# Patient Record
Sex: MALE | Race: WHITE | Employment: OTHER | URBAN - METROPOLITAN AREA
[De-identification: names, ages, dates, MRNs, and addresses within clinical notes are randomized per-mention and may not be internally consistent; named-entity substitution may affect disease eponyms.]

---

## 2022-09-14 RX ORDER — LOSARTAN POTASSIUM 25 MG/1
25 TABLET ORAL DAILY
COMMUNITY

## 2022-09-14 RX ORDER — ASPIRIN 81 MG/1
81 TABLET ORAL DAILY
COMMUNITY

## 2022-09-14 RX ORDER — GLIPIZIDE 5 MG/1
5 TABLET ORAL
COMMUNITY

## 2022-09-14 RX ORDER — ROSUVASTATIN CALCIUM 20 MG/1
20 TABLET, COATED ORAL DAILY
COMMUNITY

## 2022-09-14 RX ORDER — CLOPIDOGREL BISULFATE 75 MG/1
75 TABLET ORAL DAILY
COMMUNITY
End: 2022-09-14

## 2022-09-14 NOTE — PROGRESS NOTES
Name_______________________________________Printed:____________________  Date and time of surgery__9/19/2022_____1345_________________Arrival Time:___1215_____________   1. The instructions given regarding when and if a patient needs to stop oral intake prior to surgery varies. Follow the specific instructions you were given                  __x_Nothing to eat or to drink after Midnight the night before.                   ____Carbo loading or ERAS instructions will be given to select patients-if you have been given those instructions -please do the following                           The evening before your surgery after dinner before midnight drink 40 ounces of gatorade. If you are diabetic use sugar free. The morning of surgery drink 40 ounces of water. This needs to be finished 3 hours prior to your surgery start time. 2. Take the following pills with a small sip of water on the morning of surgery___________________________________________________                  Do not take blood pressure medications ending in pril or sartan the fausto prior to surgery or the morning of surgery_   3. Aspirin, Ibuprofen, Advil, Naproxen, Vitamin E and other Anti-inflammatory products and supplements should be stopped for 5 -7days before surgery or as directed by your physician. 4. Check with your Doctor regarding stopping Plavix, Coumadin,Eliquis, Lovenox,Effient,Pradaxa,Xarelto, Fragmin or other blood thinners and follow their instructions. 5. Do not smoke, and do not drink any alcoholic beverages 24 hours prior to surgery. This includes NA Beer. Refrain from the usage of any recreational drugs. 6. You may brush your teeth and gargle the morning of surgery. DO NOT SWALLOW WATER   7. You MUST make arrangements for a responsible adult to stay on site while you are here and take you home after your surgery. You will not be allowed to leave alone or drive yourself home.   It is strongly suggested someone stay with you the first 24 hrs. Your surgery will be cancelled if you do not have a ride home. 8. A parent/legal guardian must accompany a child scheduled for surgery and plan to stay at the hospital until the child is discharged. Please do not bring other children with you. 9. Please wear simple, loose fitting clothing to the hospital.  Amy Hodge not bring valuables (money, credit cards, checkbooks, etc.) Do not wear any makeup (including no eye makeup) or nail polish on your fingers or toes. 10. DO NOT wear any jewelry or piercings on day of surgery. All body piercing jewelry must be removed. 11. If you have ___dentures, they will be removed before going to the OR; we will provide you a container. If you wear ___contact lenses or ___glasses, they will be removed; please bring a case for them. 12. Please see your family doctor/pediatrician for a history & physical and/or concerning medications. Bring any test results/reports from your physician's office. PCP__________________Phone___________H&P Appt. Date________             13 If you  have a Living Will and Durable Power of  for Healthcare, please bring in a copy. 15. Notify your Surgeon if you develop any illness between now and surgery  time, cough, cold, fever, sore throat, nausea, vomiting, etc.  Please notify your surgeon if you experience dizziness, shortness of breath or blurred vision between now & the time of your surgery             15. DO NOT shave your operative site 96 hours prior to surgery. For face & neck surgery, men may use an electric razor 48 hours prior to surgery. 16. Shower the night before or morning of surgery using an antibacterial soap or as you have been instructed. 17. To provide excellent care visitors will be limited to one in the room at any given time. 18.  Please bring picture ID and insurance card.              19.  Visit our web site for additional information:  [email or web address]              20.During flu season no children under the age of 15 are permitted in the hospital for the safety of all patients. 21. If you take a long acting insulin in the evening only  take half of your usual  dose the night  before your procedure              22. If you use a c-pap please bring DOS if staying overnight,             23.For your convenience [facility] has a pharmacy on site to fill your prescriptions. 24. If you use oxygen and have a portable tank please bring it  with you the DOS             25. Bring a complete list of all your medications with name and dose include any supplements. 26. Other__________________________________________   *Please call pre admission testing if you any further questions   [address] [place]  [phone]   [address] [place] [address]       VISITOR POLICY(subject to change)    Current policy is 2 visitors per patient. No children. A mask is required. Visiting hours are 8a-8p. Overnight visitors will be at the discretion of the nurse. All above information reviewed with patient in person or by phone. Patient verbalizes understanding. All questions and concerns addressed.                                                                                                  Patient/Rep____patient________________                                                                                                                                    PRE OP INSTRUCTIONS

## 2022-09-16 NOTE — PROGRESS NOTES
Patient notified of surgery time change to 0845 with 0715 arrival. All other instructions remain the same. Verbalized understanding.

## 2022-09-19 ENCOUNTER — HOSPITAL ENCOUNTER (OUTPATIENT)
Age: 72
Setting detail: OUTPATIENT SURGERY
Discharge: HOME | End: 2022-09-19
Attending: PODIATRIST | Admitting: PODIATRIST
Payer: MEDICARE

## 2022-09-19 ENCOUNTER — ANESTHESIA (OUTPATIENT)
Dept: OPERATING ROOM | Age: 72
End: 2022-09-19
Payer: MEDICARE

## 2022-09-19 ENCOUNTER — ANESTHESIA EVENT (OUTPATIENT)
Dept: OPERATING ROOM | Age: 72
End: 2022-09-19
Payer: MEDICARE

## 2022-09-19 VITALS
BODY MASS INDEX: 32.14 KG/M2 | WEIGHT: 217 LBS | HEIGHT: 69 IN | HEART RATE: 83 BPM | SYSTOLIC BLOOD PRESSURE: 142 MMHG | DIASTOLIC BLOOD PRESSURE: 87 MMHG | RESPIRATION RATE: 11 BRPM | OXYGEN SATURATION: 99 % | TEMPERATURE: 97 F

## 2022-09-19 LAB
GLUCOSE BLD-MCNC: 126 MG/DL (ref 70–99)
GLUCOSE BLD-MCNC: 129 MG/DL (ref 70–99)
PERFORMED ON: ABNORMAL
PERFORMED ON: ABNORMAL

## 2022-09-19 PROCEDURE — 2500000003 HC RX 250 WO HCPCS: Performed by: NURSE ANESTHETIST, CERTIFIED REGISTERED

## 2022-09-19 PROCEDURE — 2709999900 HC NON-CHARGEABLE SUPPLY: Performed by: PODIATRIST

## 2022-09-19 PROCEDURE — 7100000011 HC PHASE II RECOVERY - ADDTL 15 MIN: Performed by: PODIATRIST

## 2022-09-19 PROCEDURE — 2500000003 HC RX 250 WO HCPCS: Performed by: PODIATRIST

## 2022-09-19 PROCEDURE — C1889 IMPLANT/INSERT DEVICE, NOC: HCPCS | Performed by: PODIATRIST

## 2022-09-19 PROCEDURE — 6360000002 HC RX W HCPCS: Performed by: ANESTHESIOLOGY

## 2022-09-19 PROCEDURE — 3700000001 HC ADD 15 MINUTES (ANESTHESIA): Performed by: PODIATRIST

## 2022-09-19 PROCEDURE — 6360000002 HC RX W HCPCS: Performed by: NURSE ANESTHETIST, CERTIFIED REGISTERED

## 2022-09-19 PROCEDURE — 3600000013 HC SURGERY LEVEL 3 ADDTL 15MIN: Performed by: PODIATRIST

## 2022-09-19 PROCEDURE — 7100000010 HC PHASE II RECOVERY - FIRST 15 MIN: Performed by: PODIATRIST

## 2022-09-19 PROCEDURE — 6360000002 HC RX W HCPCS: Performed by: PODIATRIST

## 2022-09-19 PROCEDURE — 3700000000 HC ANESTHESIA ATTENDED CARE: Performed by: PODIATRIST

## 2022-09-19 PROCEDURE — 64445 NJX AA&/STRD SCIATIC NRV IMG: CPT | Performed by: ANESTHESIOLOGY

## 2022-09-19 PROCEDURE — 2580000003 HC RX 258: Performed by: PODIATRIST

## 2022-09-19 PROCEDURE — 7100000000 HC PACU RECOVERY - FIRST 15 MIN: Performed by: PODIATRIST

## 2022-09-19 PROCEDURE — 2720000010 HC SURG SUPPLY STERILE: Performed by: PODIATRIST

## 2022-09-19 PROCEDURE — 7100000001 HC PACU RECOVERY - ADDTL 15 MIN: Performed by: PODIATRIST

## 2022-09-19 PROCEDURE — C1713 ANCHOR/SCREW BN/BN,TIS/BN: HCPCS | Performed by: PODIATRIST

## 2022-09-19 PROCEDURE — 3600000014 HC SURGERY LEVEL 4 ADDTL 15MIN: Performed by: PODIATRIST

## 2022-09-19 PROCEDURE — 3600000003 HC SURGERY LEVEL 3 BASE: Performed by: PODIATRIST

## 2022-09-19 PROCEDURE — 3600000004 HC SURGERY LEVEL 4 BASE: Performed by: PODIATRIST

## 2022-09-19 PROCEDURE — 2500000003 HC RX 250 WO HCPCS: Performed by: ANESTHESIOLOGY

## 2022-09-19 DEVICE — PATCH AMNION 2 LAYR PROTCT 4 X 4CM STERISHIELD II: Type: IMPLANTABLE DEVICE | Site: FOOT | Status: FUNCTIONAL

## 2022-09-19 DEVICE — SONICANCHOR KIT
Type: IMPLANTABLE DEVICE | Site: FOOT | Status: FUNCTIONAL
Brand: SONICANCHOR

## 2022-09-19 RX ORDER — SODIUM CHLORIDE 9 MG/ML
INJECTION, SOLUTION INTRAVENOUS CONTINUOUS
Status: DISCONTINUED | OUTPATIENT
Start: 2022-09-19 | End: 2022-09-19 | Stop reason: HOSPADM

## 2022-09-19 RX ORDER — FENTANYL CITRATE 50 UG/ML
100 INJECTION, SOLUTION INTRAMUSCULAR; INTRAVENOUS ONCE
Status: COMPLETED | OUTPATIENT
Start: 2022-09-19 | End: 2022-09-19

## 2022-09-19 RX ORDER — PROPOFOL 10 MG/ML
INJECTION, EMULSION INTRAVENOUS PRN
Status: DISCONTINUED | OUTPATIENT
Start: 2022-09-19 | End: 2022-09-19 | Stop reason: SDUPTHER

## 2022-09-19 RX ORDER — MIDAZOLAM HYDROCHLORIDE 2 MG/2ML
2 INJECTION, SOLUTION INTRAMUSCULAR; INTRAVENOUS ONCE
Status: COMPLETED | OUTPATIENT
Start: 2022-09-19 | End: 2022-09-19

## 2022-09-19 RX ORDER — EPHEDRINE SULFATE/0.9% NACL/PF 50 MG/5 ML
SYRINGE (ML) INTRAVENOUS PRN
Status: DISCONTINUED | OUTPATIENT
Start: 2022-09-19 | End: 2022-09-19 | Stop reason: SDUPTHER

## 2022-09-19 RX ORDER — SODIUM CHLORIDE, SODIUM LACTATE, POTASSIUM CHLORIDE, CALCIUM CHLORIDE 600; 310; 30; 20 MG/100ML; MG/100ML; MG/100ML; MG/100ML
INJECTION, SOLUTION INTRAVENOUS CONTINUOUS
Status: DISCONTINUED | OUTPATIENT
Start: 2022-09-19 | End: 2022-09-19 | Stop reason: HOSPADM

## 2022-09-19 RX ORDER — FENTANYL CITRATE 50 UG/ML
INJECTION, SOLUTION INTRAMUSCULAR; INTRAVENOUS PRN
Status: DISCONTINUED | OUTPATIENT
Start: 2022-09-19 | End: 2022-09-19 | Stop reason: SDUPTHER

## 2022-09-19 RX ORDER — LIDOCAINE HYDROCHLORIDE 20 MG/ML
INJECTION, SOLUTION EPIDURAL; INFILTRATION; INTRACAUDAL; PERINEURAL PRN
Status: DISCONTINUED | OUTPATIENT
Start: 2022-09-19 | End: 2022-09-19 | Stop reason: SDUPTHER

## 2022-09-19 RX ORDER — DEXAMETHASONE SODIUM PHOSPHATE 4 MG/ML
INJECTION, SOLUTION INTRA-ARTICULAR; INTRALESIONAL; INTRAMUSCULAR; INTRAVENOUS; SOFT TISSUE PRN
Status: DISCONTINUED | OUTPATIENT
Start: 2022-09-19 | End: 2022-09-19 | Stop reason: SDUPTHER

## 2022-09-19 RX ORDER — LIDOCAINE HYDROCHLORIDE 10 MG/ML
0.5 INJECTION, SOLUTION EPIDURAL; INFILTRATION; INTRACAUDAL; PERINEURAL ONCE
Status: DISCONTINUED | OUTPATIENT
Start: 2022-09-19 | End: 2022-09-19 | Stop reason: HOSPADM

## 2022-09-19 RX ORDER — BUPIVACAINE HYDROCHLORIDE 5 MG/ML
INJECTION, SOLUTION EPIDURAL; INTRACAUDAL
Status: COMPLETED | OUTPATIENT
Start: 2022-09-19 | End: 2022-09-19

## 2022-09-19 RX ORDER — DEXAMETHASONE SODIUM PHOSPHATE 4 MG/ML
INJECTION, SOLUTION INTRA-ARTICULAR; INTRALESIONAL; INTRAMUSCULAR; INTRAVENOUS; SOFT TISSUE
Status: COMPLETED | OUTPATIENT
Start: 2022-09-19 | End: 2022-09-19

## 2022-09-19 RX ORDER — ONDANSETRON 2 MG/ML
INJECTION INTRAMUSCULAR; INTRAVENOUS PRN
Status: DISCONTINUED | OUTPATIENT
Start: 2022-09-19 | End: 2022-09-19 | Stop reason: SDUPTHER

## 2022-09-19 RX ORDER — LIDOCAINE HYDROCHLORIDE 10 MG/ML
INJECTION, SOLUTION EPIDURAL; INFILTRATION; INTRACAUDAL; PERINEURAL
Status: COMPLETED | OUTPATIENT
Start: 2022-09-19 | End: 2022-09-19

## 2022-09-19 RX ADMIN — DEXAMETHASONE SODIUM PHOSPHATE 10 MG: 4 INJECTION, SOLUTION INTRAMUSCULAR; INTRAVENOUS at 09:16

## 2022-09-19 RX ADMIN — PROPOFOL 130 MG: 10 INJECTION, EMULSION INTRAVENOUS at 09:08

## 2022-09-19 RX ADMIN — FENTANYL CITRATE 100 MCG: 50 INJECTION, SOLUTION INTRAMUSCULAR; INTRAVENOUS at 08:45

## 2022-09-19 RX ADMIN — Medication 10 MG: at 09:23

## 2022-09-19 RX ADMIN — Medication 10 MG: at 09:29

## 2022-09-19 RX ADMIN — FENTANYL CITRATE 25 MCG: 50 INJECTION, SOLUTION INTRAMUSCULAR; INTRAVENOUS at 09:31

## 2022-09-19 RX ADMIN — SODIUM CHLORIDE: 9 INJECTION, SOLUTION INTRAVENOUS at 07:08

## 2022-09-19 RX ADMIN — ONDANSETRON 4 MG: 2 INJECTION INTRAMUSCULAR; INTRAVENOUS at 09:16

## 2022-09-19 RX ADMIN — BUPIVACAINE HYDROCHLORIDE 20 ML: 5 INJECTION, SOLUTION EPIDURAL; INTRACAUDAL at 08:46

## 2022-09-19 RX ADMIN — MIDAZOLAM HYDROCHLORIDE 2 MG: 1 INJECTION, SOLUTION INTRAMUSCULAR; INTRAVENOUS at 08:45

## 2022-09-19 RX ADMIN — CEFAZOLIN 2000 MG: 2 INJECTION, POWDER, FOR SOLUTION INTRAMUSCULAR; INTRAVENOUS at 09:03

## 2022-09-19 RX ADMIN — LIDOCAINE HYDROCHLORIDE 100 MG: 20 INJECTION, SOLUTION EPIDURAL; INFILTRATION; INTRACAUDAL; PERINEURAL at 09:08

## 2022-09-19 RX ADMIN — FENTANYL CITRATE 50 MCG: 50 INJECTION, SOLUTION INTRAMUSCULAR; INTRAVENOUS at 09:07

## 2022-09-19 ASSESSMENT — PAIN SCALES - GENERAL
PAINLEVEL_OUTOF10: 0

## 2022-09-19 NOTE — PROGRESS NOTES
Pt arrived from OR to PACU bay 6. Reported received from [address] [place] [address] staff. Pt arousable to voice. Surgical incisions dressings in place to left foot. Pt on 6L simple mask. NSR, and VSS. Will continue to monitor.

## 2022-09-19 NOTE — H&P
Podiatric Surgery Same Day Surgery H&P Update Note  [place] [facility]        OSMEL have examined the patient and reviewed the original history and physical completed and find no relevant changes. The nature of the procedure, possible complications, alternative forms of therapy, post-op course, and post-op goals have been explained to patient/patient family. All questions have been answered. The consent has been signed. Patient's surgical site has been marked.       Discussed with Dr. Diane Decker DPM.     Dr. Diane Decker [facility]   Office: [phone]  Cell: [phone]

## 2022-09-19 NOTE — OP NOTE
Operative Note      Patient: Wilfrido Vasquez  YOB: 1950  MRN: [id number]    Date of Procedure: 9/19/2022    Pre-Op Diagnosis: M25.775 EXOSTOSIS/OSTEOPHYTE-LEFT FOOT  M76.72 PERONEAL TENDINITIS-LEFT FOOT    Post-Op Diagnosis: Same       Procedure(s):  49211- Ostectomy 5th Metatarsal- left;  20286- Repair Peroneus Brevis- left;  90679- Application Below Knee Splint- left     Surgeon(s):  Francisco Beyer DPM     Assistant:  Ysei Mendez DPM, PGY-3  Ema Gamez, MS-4     Anesthesia: General with popliteal/saphenous nerve block     Hemostasis: Pneumatic calf tourniquet at 250 mmHg for 43 minutes     Injectables: 3 cc of 1% lidocaine with epinephrine preoperatively     Materials: 2-0 vicryl, 3-0 vicryl, 3-0 nylon     Implants: SteriShield Dual Layer Amnion Graft 4x4cm  [employer] Sonic [place] x1      Estimated Blood Loss (mL): less than 50     Complications: None    Specimens:   * No specimens in log *    Implants:  Implant Name Type Inv. Item Serial No.  Lot No. LRB No. Used Action   PATCH AMNION 2 LAYR PROTCT 4 X 4CM STERISHIELD II - [id number]  PATCH AMNION 2 LAYR PROTCT 4 X 4CM STERISHIELD II [id number] BONE BANK ALLOGRAFTS-WD  Left 1 Implanted   sonic anchor moreno     [id number] Left 1 Implanted         Drains: * No LDAs found *    Findings: Partial thickness wound excised in total with reapproximation of skin edges with minimal tension. INDICATIONS FOR PROCEDURE: This patient has signs and symptoms clinically  consistent with the above mentioned preoperative diagnosis. Having failed conservative treatment, it was determined that the patient would benefit from surgical intervention. All potential risks, benefits, and complications were discussed with the patient prior to the scheduling of surgery. All the patient's questions were answered and no guarantees were given. The patient wished to proceed with surgery, and informed written consent was obtained.       DETAILS OF PROCEDURE: The patient received a popliteal/saphenous nerve block in the preoperative holding area performed by the anesthesia staff. The patient was brought from the pre-operative area and placed on the operating table in the supine position. A pneumatic calf tourniquet was placed around the patient's well-padded left lower extremity. Following IV sedation, 3 cc of 1% lidocaine with epinephrine was injected about the planned incision line to aid in hemostasis. The left lower extremity was then scrubbed, prepped, and draped in the usual sterile fashion. A time-out was performed. The patient, procedure, and operative site were confirmed. An Esmarch bandage was then utilized to exsanguinate the patient's left lower extremity. The tourniquet was then inflated to 250 mmHg and the following procedure was performed. PROCEDURE #1: 47525- Ostectomy 5th Metatarsal- left: Attention was directed toward the fifth metatarsal base of the left foot where a partial-thickness ulceration was noted over the lateral aspect of the fifth metatarsal base. Using #15 blade, a curvilinear incision was made, excising out the partial-thickness ulceration. Using a combination of blunt and sharp dissection, the incision was was carried down to the level of deep fascia. Care was taken to identify and retract all vital neurovascular structures. All venous tributaries were electrocoagulated as encountered. Next, using #15 blade, a periosteal incision was made along the lateral aspect of the fifth metatarsal base. At this time, the fracture line was identified. A sagittal saw was then used to osteotomize the fifth metatarsal at the level of the fracture line. The base of fifth metatarsal was then excised from its surrounding soft tissue attachments and passed from the operative field to the back table.  Care was taken to identify and tag the peroneus brevis tendon insertion at the base of the fifth metatarsal to be used later for tendon transfer. Next, using a rongeur and a hand rasp, the sharp bony prominences of the fifth metatarsal were resected until a smooth anatomic contour was noted. The surgical site was then irrigated with copious amounts of normal sterile saline. PROCEDURE #2: 24425- Repair Peroneus Brevis- left: Attention was then directed toward the cuboid. A [employer] sonic anchor was then inserted into the cuboid bone according manufactures instructions. The 2-0 ForceFiber suture attached to the sonic anchor was then used to reinsert the peroneus brevis tendon. The surgical site was then irrigated with copious amounts of normal sterile saline. Next, the periosteal layer was reapproximated using 3-0 vicryl. At this time, a SteriShiled Dual Layer Amnion graft was prepared on the back table according to the manufacturers instructions. The graft was then applied over the insertion of the peroneus brevis to prevent adhesion formation and aid in postoperative recovery. Skin edges were then reapproximated using 3-0 nylon in a simple interrupted suture fashion. PROCEDURE #3: 31509- Application of Below Knee Splint- left:  A soft sterile dressing was applied consisting of Polysporin ointment, Adaptic, gauze, cast padding, Ace band. The pneumatic calf tourniquet was rapidly deflated after a total time of 43 minutes and a prompt hyperemic response was noted on all aspects of the patient's left lower extremity. Next, copious amounts of cast padding was applied to the patient's left lower extremity from the metatarsal heads to approximately 3 finger breaths distal to the head and neck of the fibula. Next, using moistened padded splint material, a posterior splint was applied from the plantar foot posteriorly up the leg to approximately the midcalf area. ACE compression was then applied from distal to proximal to secure the posterior splint in place and provide compression to prevent post-operative edema.  At this time, the foot was then placed in neutral position to prevent acquired equinus deformity and relieve tension on the surgical repair. END OF PROCEDURE: The patient tolerated the procedure and anesthesia well and was transported from the operating room to the PACU with vital signs stable and vascular status intact to all aspects of the patient's left lower extremity and digital capillary refill time immediate to the digits of the left foot. Following a period of post-operative monitoring, the patient will be discharged home with written and oral wound care and follow-up instructions per Dr. Eleni Sacks. The patient is to follow-up with Dr. Josiah Joe in his private office within 5-7 days. The patient is to keep dressing clean, dry and intact at all times. The patient is to call with if any complications occur.     Dictated on behalf of Dr. Eleni Sacks, DPM Dalbert Johann, DPM  Podiatric Resident, PGY-3    Electronically signed by Binta Rodriguez DPM on 9/19/2022 at 12:29 PM

## 2022-09-19 NOTE — ANESTHESIA POSTPROCEDURE EVALUATION
Department of Anesthesiology  Postprocedure Note    Patient: Elzie Schirmer  MRN: [id number]  YOB: 1950  Date of evaluation: 9/19/2022      Procedure Summary     Date: 09/19/22 Room / Location: [address]    Anesthesia Start: 2162 Anesthesia Stop: 9372    Procedures:       OSTECTOMY FIFTH METATARSAL-LEFT-POPLITEAL BLOCK-LIZANDRO (Left: Foot)      REPAIR PERONEUS BREVIS-LEFT FOOT (Left) Diagnosis:       Osteophyte, left foot      Peroneal tendinitis, left      (M25.775 EXOSTOSIS/OSTEOPHYTE-LEFT FOOT)      (M76.72 PERONEAL TENDINITIS-LEFT FOOT)    Surgeons: Antonio Salinas DPM Responsible Provider:     Anesthesia Type: general, regional ASA Status: 2          Anesthesia Type: No value filed.     Kranthi Phase I: Kranthi Score: 8    Kranthi Phase II:        Anesthesia Post Evaluation    Patient location during evaluation: PACU  Patient participation: complete - patient participated  Level of consciousness: awake and alert  Pain score: 2  Airway patency: patent  Nausea & Vomiting: no vomiting  Complications: no  Cardiovascular status: blood pressure returned to baseline  Respiratory status: acceptable  Hydration status: euvolemic  Multimodal analgesia pain management approach

## 2022-09-19 NOTE — DISCHARGE INSTRUCTIONS
[facility] [address]   [address]  [phone]    Dr. Marge Thomas Operative Instructions    1. Have Prescriptions filled and take as directed. All medications should be taken with food or milk. 2.  Keep foot elevated six inches above the level of the heart. Support feet, legs, and knees with pillows. 3.  KEEP FOOT ELEVATED AS MUCH AS POSSIBLE UNTIL YOUR NEXT VISIT    4. Place an ice pack on the bandaged site for 15 minutes every hour while awake    5. Keep dressing clean, dry, and intact. DO NOT REMOVE DRESSING. CALL THE OFFICE IF BANDAGE COMES OFF. 6.  For the first 7 days after surgery, take temperature by mouth three times a day. Call the office if greater than 101F. 7. NONweight bearing to the left lower extremity with surgical shoe or crutches / walker. 8.  All instructions are to be followed until otherwise instructed by your surgeon. 9.  Call our office if you have any concerns or questions which arise. Our phones are answered 24 hours a day. [phone]    49. Your first post-operative appointment is scheduled, call the office for appointment date and time if not known prior to today. ANESTHESIA DISCHARGE INSTRUCTIONS    Wear your seatbelt home. You are under the influence of drugs-do not drink alcohol, drive, operate machinery, make any important decisions or sign any legal documents for 24 hours. A responsible adult needs to be with you for 24 hours. You may experience lightheadedness, dizziness, or sleepiness following surgery. Rest at home today- increase activity as tolerated. Progress slowly to a regular diet unless your physician has instructed you otherwise. Drink plenty of water. If persistent nausea and vomiting becomes a problem, call your physician.   Coughing, sore throat and muscle aches are other side effects of anesthesia, and should improve with time. Do not drive or operate machinery while taking narcotics. GENERAL SURGERY DISCHARGE INSTRUCTIONS    Follow your surgeons instructions. Follow up with your surgeon as directed. Observe the operative area for signs of excessive bleeding. If needed apply pressure,elevate if able and contact your surgeon. Observe the operative site for any signs of infection- such as increased pain,redness,fever greater than 101 degrees,swelling, foul odor or drainage. Contact your surgeon if any of these symptoms are present. Keep operative site clean and dry. Do not remove dressing unless instructed to by surgeon. Apply ice as directed. If unable to urinate once you are at home,  notify your surgeon or go to the Emergency Room. Avoid pulling,pushing or tugging to suture line. If you become short of breath call your doctor or go to the ER. Take medications as directed. Pain medication should be taken with food. Do not drive or operate machinery while taking narcotics. For any problems or question call your surgeon.

## 2022-09-19 NOTE — ANESTHESIA PRE PROCEDURE
Department of Anesthesiology  Preprocedure Note       Name:  Chela Reece   Age:  81 y.o.  :  1950                                          MRN:  [id number]         Date:  2022      Surgeon: Jennifer Dale):  Vipul Mcwilliams DPM    Procedure: Procedure(s):  OSTECTOMY FIFTH METATARSAL-LEFT-POPLITEAL BLOCK-LIZANDRO  REPAIR PERONEUS BREVIS-LEFT FOOT    Medications prior to admission:   Prior to Admission medications    Medication Sig Start Date End Date Taking? Authorizing Provider   aspirin 81 MG EC tablet Take 81 mg by mouth daily   Yes Historical Provider, MD   metFORMIN (GLUCOPHAGE) 500 MG tablet Take 500 mg by mouth 2 times daily (with meals) 2 tabs in am and 1 tab po hs   Yes Historical Provider, MD   losartan (COZAAR) 25 MG tablet Take 25 mg by mouth daily   Yes Historical Provider, MD   rosuvastatin (CRESTOR) 20 MG tablet Take 20 mg by mouth daily   Yes Historical Provider, MD   glipiZIDE (GLUCOTROL) 5 MG tablet Take 5 mg by mouth 2 times daily (before meals)   Yes Historical Provider, MD       Current medications:    Current Facility-Administered Medications   Medication Dose Route Frequency Provider Last Rate Last Admin    ceFAZolin (ANCEF) 2,000 mg in dextrose 5 % 50 mL IVPB (mini-bag)  2,000 mg IntraVENous On Call to [address] [place] [address], DP        lactated ringers infusion   IntraVENous Continuous Antonio Acuña DPM        lidocaine PF 1 % injection 0.5 mL  0.5 mL IntraDERmal Once Vipul Mcwilliams DPM        0.9 % sodium chloride infusion   IntraVENous Continuous JAMAAL BotelloM 100 mL/hr at 22 0708 New Bag at 22 0708       Allergies: Allergies   Allergen Reactions    Ace Inhibitors      cough    Pcn [Penicillins]        Problem List:  There is no problem list on file for this patient.       Past Medical History:        Diagnosis Date    CAD (coronary artery disease)     CKD (chronic kidney disease) stage 3, GFR 30-59 ml/min ([facility])     Diabetes mellitus ([facility]     Hyperlipidemia     Hypertension     Ischemic stroke ([facility]        Past Surgical History:        Procedure Laterality Date    HAND SURGERY         Social History:    Social History     Tobacco Use    Smoking status: Never    Smokeless tobacco: Never   Substance Use Topics    Alcohol use: Never                                Counseling given: Not Answered      Vital Signs (Current):   Vitals:    09/14/22 1504 09/19/22 0648   BP:  139/81   Pulse:  92   Resp:  16   Temp:  98.7 °F (37.1 °C)   TempSrc:  Temporal   SpO2:  98%   Weight: 216 lb (98 kg) 217 lb (98.4 kg)   Height: 5' 9\" (1.753 m) 5' 9\" (1.753 m)                                              BP Readings from Last 3 Encounters:   09/19/22 139/81       NPO Status: Time of last liquid consumption: 1800                        Time of last solid consumption: 1800                        Date of last liquid consumption: 09/18/22                        Date of last solid food consumption: 09/18/22    BMI:   Wt Readings from Last 3 Encounters:   09/19/22 217 lb (98.4 kg)     Body mass index is 32.05 kg/m².     CBC: No results found for: WBC, RBC, HGB, HCT, MCV, RDW, PLT    CMP: No results found for: NA, K, CL, CO2, BUN, CREATININE, GFRAA, AGRATIO, LABGLOM, GLUCOSE, GLU, PROT, CALCIUM, BILITOT, ALKPHOS, AST, ALT    POC Tests:   Recent Labs     09/19/22  0706   POCGLU 129*       Coags: No results found for: PROTIME, INR, APTT    HCG (If Applicable): No results found for: PREGTESTUR, PREGSERUM, HCG, HCGQUANT     ABGs: No results found for: PHART, PO2ART, OVH0MNU, YNA7AJT, BEART, Y7TCRBAJ     Type & Screen (If Applicable):  No results found for: LABABO, LABRH    Drug/Infectious Status (If Applicable):  No results found for: HIV, HEPCAB    COVID-19 Screening (If Applicable): No results found for: COVID19        Anesthesia Evaluation  Patient summary reviewed and Nursing notes reviewed  Airway: Mallampati: II  TM distance: >3 FB   Neck ROM: full  Mouth opening: > = 3 FB   Dental:          Pulmonary:                              Cardiovascular:  Exercise tolerance: good (>4 METS),   (+) hypertension: moderate, CAD: non-obstructive,                   Neuro/Psych:   (+) CVA: residual symptoms,             GI/Hepatic/Renal:   (+) renal disease: CRI,           Endo/Other:    (+) DiabetesType II DM, well controlled, , .                 Abdominal:             Vascular: Other Findings:           Anesthesia Plan      general and regional     ASA 2       Induction: intravenous. MIPS: Postoperative opioids intended and Prophylactic antiemetics administered. Anesthetic plan and risks discussed with patient. Plan discussed with CRNA.           Post-op pain plan if not by surgeon: single peripheral nerve block            Leti Ortega MD   9/19/2022

## 2022-09-19 NOTE — BRIEF OP NOTE
Brief Postoperative Note      Patient: Palma Vyas  YOB: 1950  MRN: [id number]    Date of Procedure: 9/19/2022    Pre-Op Diagnosis: M25.775 EXOSTOSIS/OSTEOPHYTE-LEFT FOOT  M76.72 PERONEAL TENDINITIS-LEFT FOOT    Post-Op Diagnosis: Same       Procedure(s):  10630- Ostectomy 5th Metatarsal- left;  54387- Repair Peroneus Brevis- left;  86023- Application Below Knee Splint- left    Surgeon(s):  Alex Gu DPM    Assistant:  Sunny Amaya DPM, PGY-3  Colletta Clamp, MS-4    Anesthesia: General with popliteal/saphenous nerve block    Hemostasis: Pneumatic calf tourniquet at 250 mmHg for 43 minutes    Injectables: 3 cc of 1% lidocaine with epinephrine preoperatively    Materials: 2-0 vicryl, 3-0 vicryl, 3-0 nylon    Implants: SteriShield Dual Layer Amnion Graft 4x4cm  [employer] Sonic [place] x1     Estimated Blood Loss (mL): less than 50     Complications: None    Specimens:   * No specimens in log *    Implants:  Implant Name Type Inv.  Item Serial No.  Lot No. LRB No. Used Action   PATCH AMNION 2 LAYR PROTCT 4 X 4CM STERISHIELD II - [id number]  PATCH AMNION 2 LAYR PROTCT 4 X 4CM STERISHIELD II [id number] B[employer] BANK ALLOGRAFTS-WD  Left 1 Implanted   sonic anchor moreno     [id number] Left 1 Implanted         Drains: * No LDAs found *    Findings: see op report    Electronically signed by Sunny Amaya DPM on 9/19/2022 at 10:18 AM

## 2022-09-19 NOTE — ANESTHESIA PROCEDURE NOTES
Peripheral Block    Patient location during procedure: pre-op  Reason for block: post-op pain management and at surgeon's request  Start time: 9/19/2022 8:46 AM  End time: 9/19/2022 8:51 AM  Staffing  Performed: anesthesiologist   Anesthesiologist: Gi Levy MD  Preanesthetic Checklist  Completed: patient identified, IV checked, site marked, risks and benefits discussed, surgical/procedural consents, equipment checked, pre-op evaluation, timeout performed, anesthesia consent given, oxygen available, monitors applied/VS acknowledged, fire risk safety assessment completed and verbalized and blood product R/B/A discussed and consented  Peripheral Block   Patient position: prone  Prep: ChloraPrep  Patient monitoring: cardiac monitor, continuous pulse ox, continuous capnometry, frequent blood pressure checks, IV access, oxygen and responsive to questions  Block type: Sciatic  Popliteal  Laterality: left  Injection technique: single-shot  Guidance: nerve stimulator and ultrasound guided    Needle   Needle type: insulated echogenic nerve stimulator needle   Needle gauge: 21 G  Needle localization: nerve stimulator, ultrasound guidance and anatomical landmarks  Test dose: negative  Assessment   Injection assessment: negative aspiration for heme, no paresthesia on injection, local visualized surrounding nerve on ultrasound and no intravascular symptoms  Paresthesia pain: immediately resolved  Slow fractionated injection: yes  Hemodynamics: stable  Real-time US image taken/store: yes  Outcomes: uncomplicated    Medications Administered  bupivacaine (PF) 0.5 % - Perineural   20 mL - 9/19/2022 8:46:00 AM

## 2022-10-02 NOTE — ADDENDUM NOTE
Addendum  created 10/02/22 1024 by Ciara Ahuja MD    Clinical Note Signed, Diagnosis association updated, Intraprocedure Blocks edited, SmartForm saved

## 2022-11-21 ENCOUNTER — HOSPITAL ENCOUNTER (INPATIENT)
Age: 72
LOS: 1 days | Discharge: HOME | DRG: 629 | End: 2022-11-22
Attending: EMERGENCY MEDICINE | Admitting: INTERNAL MEDICINE
Payer: MEDICARE

## 2022-11-21 ENCOUNTER — APPOINTMENT (OUTPATIENT)
Dept: GENERAL RADIOLOGY | Age: 72
DRG: 629 | End: 2022-11-21
Payer: MEDICARE

## 2022-11-21 DIAGNOSIS — L08.9 INFECTED WOUND: Primary | ICD-10-CM

## 2022-11-21 DIAGNOSIS — T14.8XXA INFECTED WOUND: Primary | ICD-10-CM

## 2022-11-21 DIAGNOSIS — L08.9 LEFT FOOT INFECTION: ICD-10-CM

## 2022-11-21 DIAGNOSIS — G89.18 POST-OPERATIVE PAIN: ICD-10-CM

## 2022-11-21 PROBLEM — L97.529 TYPE 2 DIABETES MELLITUS WITH LEFT DIABETIC FOOT ULCER (HCC): Status: ACTIVE | Noted: 2022-11-21

## 2022-11-21 PROBLEM — E11.621 TYPE 2 DIABETES MELLITUS WITH LEFT DIABETIC FOOT ULCER (HCC): Status: ACTIVE | Noted: 2022-11-21

## 2022-11-21 LAB
ABO/RH: NORMAL
ANION GAP SERPL CALCULATED.3IONS-SCNC: 11 MMOL/L (ref 3–16)
ANTIBODY SCREEN: NORMAL
BASOPHILS ABSOLUTE: 0.1 K/UL (ref 0–0.2)
BASOPHILS RELATIVE PERCENT: 0.5 %
BUN BLDV-MCNC: 22 MG/DL (ref 7–20)
C-REACTIVE PROTEIN: 17.3 MG/L (ref 0–5.1)
CALCIUM SERPL-MCNC: 9.9 MG/DL (ref 8.3–10.6)
CHLORIDE BLD-SCNC: 99 MMOL/L (ref 99–110)
CHP ED QC CHECK: YES
CO2: 27 MMOL/L (ref 21–32)
CREAT SERPL-MCNC: 1.1 MG/DL (ref 0.8–1.3)
EKG ATRIAL RATE: 78 BPM
EKG DIAGNOSIS: NORMAL
EKG P AXIS: 68 DEGREES
EKG P-R INTERVAL: 222 MS
EKG Q-T INTERVAL: 398 MS
EKG QRS DURATION: 106 MS
EKG QTC CALCULATION (BAZETT): 453 MS
EKG R AXIS: 66 DEGREES
EKG T AXIS: 91 DEGREES
EKG VENTRICULAR RATE: 78 BPM
EOSINOPHILS ABSOLUTE: 0.2 K/UL (ref 0–0.6)
EOSINOPHILS RELATIVE PERCENT: 1.4 %
GFR SERPL CREATININE-BSD FRML MDRD: >60 ML/MIN/{1.73_M2}
GLUCOSE BLD-MCNC: 114 MG/DL (ref 70–99)
GLUCOSE BLD-MCNC: 129 MG/DL (ref 70–99)
GLUCOSE BLD-MCNC: 140 MG/DL (ref 70–99)
GLUCOSE BLD-MCNC: 154 MG/DL
GLUCOSE BLD-MCNC: 154 MG/DL (ref 70–99)
HCT VFR BLD CALC: 38.2 % (ref 40.5–52.5)
HEMOGLOBIN: 12.9 G/DL (ref 13.5–17.5)
INR BLD: 1.16 (ref 0.87–1.14)
LYMPHOCYTES ABSOLUTE: 1.1 K/UL (ref 1–5.1)
LYMPHOCYTES RELATIVE PERCENT: 9.2 %
MCH RBC QN AUTO: 30 PG (ref 26–34)
MCHC RBC AUTO-ENTMCNC: 33.7 G/DL (ref 31–36)
MCV RBC AUTO: 88.9 FL (ref 80–100)
MONOCYTES ABSOLUTE: 1.2 K/UL (ref 0–1.3)
MONOCYTES RELATIVE PERCENT: 10.2 %
NEUTROPHILS ABSOLUTE: 9.6 K/UL (ref 1.7–7.7)
NEUTROPHILS RELATIVE PERCENT: 78.7 %
PDW BLD-RTO: 13.7 % (ref 12.4–15.4)
PERFORMED ON: ABNORMAL
PLATELET # BLD: 289 K/UL (ref 135–450)
PMV BLD AUTO: 8 FL (ref 5–10.5)
POTASSIUM REFLEX MAGNESIUM: 4.3 MMOL/L (ref 3.5–5.1)
PROTHROMBIN TIME: 14.7 SEC (ref 11.7–14.5)
RBC # BLD: 4.29 M/UL (ref 4.2–5.9)
SEDIMENTATION RATE, ERYTHROCYTE: 26 MM/HR (ref 0–20)
SODIUM BLD-SCNC: 137 MMOL/L (ref 136–145)
WBC # BLD: 12.1 K/UL (ref 4–11)

## 2022-11-21 PROCEDURE — 96367 TX/PROPH/DG ADDL SEQ IV INF: CPT

## 2022-11-21 PROCEDURE — 99285 EMERGENCY DEPT VISIT HI MDM: CPT

## 2022-11-21 PROCEDURE — 85610 PROTHROMBIN TIME: CPT

## 2022-11-21 PROCEDURE — 73630 X-RAY EXAM OF FOOT: CPT

## 2022-11-21 PROCEDURE — 86850 RBC ANTIBODY SCREEN: CPT

## 2022-11-21 PROCEDURE — 2580000003 HC RX 258: Performed by: INTERNAL MEDICINE

## 2022-11-21 PROCEDURE — 86901 BLOOD TYPING SEROLOGIC RH(D): CPT

## 2022-11-21 PROCEDURE — 71046 X-RAY EXAM CHEST 2 VIEWS: CPT

## 2022-11-21 PROCEDURE — 96365 THER/PROPH/DIAG IV INF INIT: CPT

## 2022-11-21 PROCEDURE — 2580000003 HC RX 258: Performed by: PHYSICIAN ASSISTANT

## 2022-11-21 PROCEDURE — 80048 BASIC METABOLIC PNL TOTAL CA: CPT

## 2022-11-21 PROCEDURE — 93005 ELECTROCARDIOGRAM TRACING: CPT

## 2022-11-21 PROCEDURE — 6360000002 HC RX W HCPCS: Performed by: PHYSICIAN ASSISTANT

## 2022-11-21 PROCEDURE — 36415 COLL VENOUS BLD VENIPUNCTURE: CPT

## 2022-11-21 PROCEDURE — 85025 COMPLETE CBC W/AUTO DIFF WBC: CPT

## 2022-11-21 PROCEDURE — 1200000000 HC SEMI PRIVATE

## 2022-11-21 PROCEDURE — 6360000002 HC RX W HCPCS: Performed by: INTERNAL MEDICINE

## 2022-11-21 PROCEDURE — 86140 C-REACTIVE PROTEIN: CPT

## 2022-11-21 PROCEDURE — 85652 RBC SED RATE AUTOMATED: CPT

## 2022-11-21 PROCEDURE — 87040 BLOOD CULTURE FOR BACTERIA: CPT

## 2022-11-21 PROCEDURE — 86900 BLOOD TYPING SEROLOGIC ABO: CPT

## 2022-11-21 PROCEDURE — 87641 MR-STAPH DNA AMP PROBE: CPT

## 2022-11-21 RX ORDER — ONDANSETRON 2 MG/ML
4 INJECTION INTRAMUSCULAR; INTRAVENOUS EVERY 6 HOURS PRN
Status: DISCONTINUED | OUTPATIENT
Start: 2022-11-21 | End: 2022-11-22 | Stop reason: HOSPADM

## 2022-11-21 RX ORDER — POLYETHYLENE GLYCOL 3350 17 G/17G
17 POWDER, FOR SOLUTION ORAL DAILY PRN
Status: DISCONTINUED | OUTPATIENT
Start: 2022-11-21 | End: 2022-11-22 | Stop reason: HOSPADM

## 2022-11-21 RX ORDER — CLOPIDOGREL BISULFATE 75 MG/1
75 TABLET ORAL DAILY
Status: DISCONTINUED | OUTPATIENT
Start: 2022-11-22 | End: 2022-11-22 | Stop reason: HOSPADM

## 2022-11-21 RX ORDER — LABETALOL HYDROCHLORIDE 5 MG/ML
10 INJECTION, SOLUTION INTRAVENOUS EVERY 4 HOURS PRN
Status: DISCONTINUED | OUTPATIENT
Start: 2022-11-21 | End: 2022-11-22 | Stop reason: HOSPADM

## 2022-11-21 RX ORDER — GLIPIZIDE 5 MG/1
5 TABLET ORAL
COMMUNITY

## 2022-11-21 RX ORDER — SODIUM CHLORIDE 0.9 % (FLUSH) 0.9 %
5-40 SYRINGE (ML) INJECTION PRN
Status: DISCONTINUED | OUTPATIENT
Start: 2022-11-21 | End: 2022-11-22 | Stop reason: HOSPADM

## 2022-11-21 RX ORDER — ASPIRIN 81 MG/1
81 TABLET ORAL DAILY
COMMUNITY

## 2022-11-21 RX ORDER — ONDANSETRON 4 MG/1
4 TABLET, ORALLY DISINTEGRATING ORAL EVERY 8 HOURS PRN
Status: DISCONTINUED | OUTPATIENT
Start: 2022-11-21 | End: 2022-11-22 | Stop reason: HOSPADM

## 2022-11-21 RX ORDER — CLOPIDOGREL BISULFATE 75 MG/1
75 TABLET ORAL DAILY
COMMUNITY

## 2022-11-21 RX ORDER — ROSUVASTATIN CALCIUM 20 MG/1
20 TABLET, COATED ORAL DAILY
Status: DISCONTINUED | OUTPATIENT
Start: 2022-11-21 | End: 2022-11-22 | Stop reason: HOSPADM

## 2022-11-21 RX ORDER — LOSARTAN POTASSIUM 25 MG/1
25 TABLET ORAL DAILY
Status: DISCONTINUED | OUTPATIENT
Start: 2022-11-21 | End: 2022-11-22 | Stop reason: HOSPADM

## 2022-11-21 RX ORDER — ROSUVASTATIN CALCIUM 20 MG/1
20 TABLET, COATED ORAL DAILY
COMMUNITY

## 2022-11-21 RX ORDER — LOSARTAN POTASSIUM 25 MG/1
25 TABLET ORAL DAILY
COMMUNITY

## 2022-11-21 RX ORDER — INSULIN LISPRO 100 [IU]/ML
0-4 INJECTION, SOLUTION INTRAVENOUS; SUBCUTANEOUS
Status: DISCONTINUED | OUTPATIENT
Start: 2022-11-21 | End: 2022-11-22 | Stop reason: HOSPADM

## 2022-11-21 RX ORDER — ACETAMINOPHEN 325 MG/1
650 TABLET ORAL EVERY 6 HOURS PRN
Status: DISCONTINUED | OUTPATIENT
Start: 2022-11-21 | End: 2022-11-22 | Stop reason: HOSPADM

## 2022-11-21 RX ORDER — PRAVASTATIN SODIUM 20 MG
20 TABLET ORAL DAILY
COMMUNITY
End: 2022-11-21

## 2022-11-21 RX ORDER — ASPIRIN 81 MG/1
81 TABLET ORAL DAILY
Status: DISCONTINUED | OUTPATIENT
Start: 2022-11-22 | End: 2022-11-22 | Stop reason: HOSPADM

## 2022-11-21 RX ORDER — SODIUM CHLORIDE 9 MG/ML
INJECTION, SOLUTION INTRAVENOUS PRN
Status: DISCONTINUED | OUTPATIENT
Start: 2022-11-21 | End: 2022-11-22 | Stop reason: HOSPADM

## 2022-11-21 RX ORDER — ACETAMINOPHEN 650 MG/1
650 SUPPOSITORY RECTAL EVERY 6 HOURS PRN
Status: DISCONTINUED | OUTPATIENT
Start: 2022-11-21 | End: 2022-11-22 | Stop reason: HOSPADM

## 2022-11-21 RX ORDER — SODIUM CHLORIDE 0.9 % (FLUSH) 0.9 %
5-40 SYRINGE (ML) INJECTION EVERY 12 HOURS SCHEDULED
Status: DISCONTINUED | OUTPATIENT
Start: 2022-11-21 | End: 2022-11-22 | Stop reason: HOSPADM

## 2022-11-21 RX ORDER — ENOXAPARIN SODIUM 100 MG/ML
40 INJECTION SUBCUTANEOUS DAILY
Status: DISCONTINUED | OUTPATIENT
Start: 2022-11-22 | End: 2022-11-22 | Stop reason: HOSPADM

## 2022-11-21 RX ORDER — INSULIN LISPRO 100 [IU]/ML
0-4 INJECTION, SOLUTION INTRAVENOUS; SUBCUTANEOUS NIGHTLY
Status: DISCONTINUED | OUTPATIENT
Start: 2022-11-21 | End: 2022-11-22 | Stop reason: HOSPADM

## 2022-11-21 RX ADMIN — VANCOMYCIN HYDROCHLORIDE 2000 MG: 10 INJECTION, POWDER, LYOPHILIZED, FOR SOLUTION INTRAVENOUS at 12:55

## 2022-11-21 RX ADMIN — CEFEPIME HYDROCHLORIDE 2000 MG: 2 INJECTION, POWDER, FOR SOLUTION INTRAMUSCULAR; INTRAVENOUS at 19:51

## 2022-11-21 RX ADMIN — SODIUM CHLORIDE, PRESERVATIVE FREE 10 ML: 5 INJECTION INTRAVENOUS at 19:53

## 2022-11-21 RX ADMIN — CEFEPIME HYDROCHLORIDE 2000 MG: 2 INJECTION, POWDER, FOR SOLUTION INTRAMUSCULAR; INTRAVENOUS at 11:44

## 2022-11-21 ASSESSMENT — PAIN DESCRIPTION - FREQUENCY: FREQUENCY: CONTINUOUS

## 2022-11-21 ASSESSMENT — PAIN SCALES - GENERAL
PAINLEVEL_OUTOF10: 0
PAINLEVEL_OUTOF10: 9
PAINLEVEL_OUTOF10: 0

## 2022-11-21 ASSESSMENT — PAIN DESCRIPTION - PAIN TYPE: TYPE: ACUTE PAIN

## 2022-11-21 ASSESSMENT — PAIN DESCRIPTION - LOCATION: LOCATION: FOOT

## 2022-11-21 ASSESSMENT — PAIN - FUNCTIONAL ASSESSMENT: PAIN_FUNCTIONAL_ASSESSMENT: 0-10

## 2022-11-21 ASSESSMENT — ENCOUNTER SYMPTOMS
VOMITING: 0
NAUSEA: 0
SHORTNESS OF BREATH: 0

## 2022-11-21 ASSESSMENT — PAIN DESCRIPTION - DESCRIPTORS: DESCRIPTORS: ACHING

## 2022-11-21 ASSESSMENT — PAIN DESCRIPTION - ORIENTATION: ORIENTATION: LEFT

## 2022-11-21 NOTE — PROGRESS NOTES
Pharmacy  Note  - Admission Medication History    List of [id number] medications is complete. I reviewed Rx fill history via \"Complete Dispense Report\" in Epic, and spoke to the patient.       The following changes made to [id number] medication list:        Current Outpatient Medications   Medication Instructions    aspirin 81 mg, Oral, DAILY    clopidogrel (PLAVIX) 75 mg, Oral, DAILY    glipiZIDE (GLUCOTROL) 5 mg, Oral, DAILY BEFORE BREAKFAST    losartan (COZAAR) 25 mg, Oral, DAILY    metFORMIN (GLUCOPHAGE) 500 MG tablet Take 2 tablets by mouth every morning and 1 tablet by mouth every night at bedtime    rosuvastatin (CRESTOR) 20 mg, Oral, DAILY      11/21/2022 6:35 PM  Oleksandr Dupree  PharmD Candidate [employer]

## 2022-11-21 NOTE — ED PROVIDER NOTES
[facility] ENCOUNTER          PHYSICIAN ASSISTANT NOTE       Date of evaluation: 11/21/2022    Chief Complaint     Foot Pain (Patient reports to ED for admission for surgery. States he was told to be here early this AM and no one was ready for him. Then had to wait for dr office to open and was told to go through ED. Had surgery on L foot back in September for bone spur. Has to have another surgery on left foot for wound not healing correctly.)      History of Present Illness     Claiborne Shone is a 81 y.o. male with PMH of DM, HTN, HLD who presents with left foot pain. Patient reports that he had a surgery on his left foot back in September for a bone spur. He states that his sutures were removed at a postoperative visit 10 days later. He states that the wound opened up afterwards and has progressively worsened. He reports chronic pain in the foot, worse at night, and for which he has been taking oxycodone. He denies fevers, nausea or vomiting or other systemic symptoms. He reports that his diabetes is well controlled. Review of Systems     Review of Systems   Constitutional:  Negative for chills and fever. Respiratory:  Negative for shortness of breath. Cardiovascular:  Negative for chest pain. Gastrointestinal:  Negative for nausea and vomiting. Musculoskeletal:  Negative for gait problem. Skin:  Positive for rash and wound. Neurological:  Negative for weakness and numbness. Psychiatric/Behavioral:  The patient is not nervous/anxious. All other systems reviewed and are negative. Past Medical, Surgical, Family, and Social History     He has no past medical history on file. He has no past surgical history on file. His family history is not on file. He reports that he has never smoked. He has never used smokeless tobacco. He reports that he does not currently use alcohol.     Medications     Previous Medications    ASPIRIN 81 MG EC TABLET Take 81 mg by mouth daily    CLOPIDOGREL (PLAVIX) 75 MG TABLET    Take 75 mg by mouth daily    GLIPIZIDE (GLUCOTROL) 5 MG TABLET    Take 5 mg by mouth every morning (before breakfast)    LOSARTAN (COZAAR) 25 MG TABLET    Take 25 mg by mouth daily    METFORMIN (GLUCOPHAGE) 500 MG TABLET    See Admin Instructions Take 2 tablets by mouth every morning and 1 tablet by mouth every night at bedtime    ROSUVASTATIN (CRESTOR) 20 MG TABLET    Take 20 mg by mouth daily       Allergies     He is allergic to penicillins. Physical Exam     INITIAL VITALS: BP: (!) 151/72,Temp: 98.2 °F (36.8 °C), Heart Rate: 84, Resp: 18, SpO2: 100 %   Physical Exam  Vitals and nursing note reviewed. Constitutional:       General: He is not in acute distress. HENT:      Head: Normocephalic and atraumatic. Mouth/Throat:      Mouth: Mucous membranes are moist.   Eyes:      Extraocular Movements: Extraocular movements intact. Conjunctiva/sclera: Conjunctivae normal.   Cardiovascular:      Rate and Rhythm: Normal rate and regular rhythm. Pulmonary:      Effort: Pulmonary effort is normal. No respiratory distress. Breath sounds: Normal breath sounds. No wheezing, rhonchi or rales. Abdominal:      General: Bowel sounds are normal. There is no distension. Palpations: Abdomen is soft. Tenderness: There is no abdominal tenderness. Musculoskeletal:         General: No deformity. Cervical back: Neck supple. Comments: Ulceration to the lateral aspect of the left foot with active drainage, surrounding erythema and tenderness. See picture below. Skin:     General: Skin is warm and dry. Neurological:      Mental Status: He is alert and oriented to person, place, and time. Psychiatric:         Mood and Affect: Mood normal.         Behavior: Behavior normal.           Diagnostic Results     RADIOLOGY:  XR FOOT LEFT (MIN 3 VIEWS)   Final Result   1.  Lytic area along the base of the fifth metatarsal suspicious for possible osteomyelitis. Clinical correlation recommended. LABS:   Results for orders placed or performed during the hospital encounter of 11/21/22   Sedimentation Rate   Result Value Ref Range    Sed Rate 26 (H) 0 - 20 mm/Hr   C-Reactive Protein   Result Value Ref Range    CRP 17.3 (H) 0.0 - 5.1 mg/L   BMP w/ Reflex to MG   Result Value Ref Range    Sodium 137 136 - 145 mmol/L    Potassium reflex Magnesium 4.3 3.5 - 5.1 mmol/L    Chloride 99 99 - 110 mmol/L    CO2 27 21 - 32 mmol/L    Anion Gap 11 3 - 16    Glucose 129 (H) 70 - 99 mg/dL    BUN 22 (H) 7 - 20 mg/dL    Creatinine 1.1 0.8 - 1.3 mg/dL    Est, Glom Filt Rate >60 >60    Calcium 9.9 8.3 - 10.6 mg/dL   CBC with Auto Differential   Result Value Ref Range    WBC 12.1 (H) 4.0 - 11.0 K/uL    RBC 4.29 4. 20 - 5.90 M/uL    Hemoglobin 12.9 (L) 13.5 - 17.5 g/dL    Hematocrit 38.2 (L) 40.5 - 52.5 %    MCV 88.9 80.0 - 100.0 fL    MCH 30.0 26.0 - 34.0 pg    MCHC 33.7 31.0 - 36.0 g/dL    RDW 13.7 12.4 - 15.4 %    Platelets 538 195 - 297 K/uL    MPV 8.0 5.0 - 10.5 fL    Neutrophils % 78.7 %    Lymphocytes % 9.2 %    Monocytes % 10.2 %    Eosinophils % 1.4 %    Basophils % 0.5 %    Neutrophils Absolute 9.6 (H) 1.7 - 7.7 K/uL    Lymphocytes Absolute 1.1 1.0 - 5.1 K/uL    Monocytes Absolute 1.2 0.0 - 1.3 K/uL    Eosinophils Absolute 0.2 0.0 - 0.6 K/uL    Basophils Absolute 0.1 0.0 - 0.2 K/uL       RECENT VITALS:  BP: (!) 151/72, Temp: 98.2 °F (36.8 °C), Heart Rate: 84,Resp: 18, SpO2: 100 %     Procedures         ED Course     Nursing Notes, Past Medical Hx, Past Surgical Hx, Social Hx, Allergies, and Family Hx were reviewed.     The patient was given the followingmedications:  Orders Placed This Encounter   Medications    DISCONTD: cefepime (MAXIPIME) 1000 mg IVPB minibag     Order Specific Question:   Antimicrobial Indications     Answer:   Skin and Soft Tissue Infection    cefepime (MAXIPIME) 2000 mg IVPB minibag     Order Specific Question:   Antimicrobial Indications     Answer:   Skin and Soft Tissue Infection    vancomycin (VANCOCIN) 2,000 mg in dextrose 5 % 500 mL IVPB     Order Specific Question:   Antimicrobial Indications     Answer:   Skin and Soft Tissue Infection       CONSULTS:  IP CONSULT TO PODIATRY  IP CONSULT TO HOSPITALIST  PHARMACY TO [address] [address] / ASSESSMENT / PLAN     Carmelo Hancock is a 81 y.o. male with PMH of DM, HTN, HLD who presents with left foot pain. Patient reports that he had a surgery performed in September of this year for a bone spur on his left foot. Once the sutures were removed, the wound opened up and he states it has progressively worsened. No systemic symptoms. He reports that he was directed to the hospital today by podiatry for potential surgery later today. On exam, he is ulceration to the lateral aspect of the left foot with active drainage, surrounding erythema and tenderness. Labs including CBC, BMP, inflammatory markers and an x-ray of the left foot were ordered. Labs revealed leukocytosis of 12, mildly elevated inflammatory markers. Xray with lytic area along 5th metatarsal suspicious for possible osteomyelitis. Podiatry was consulted and recommended starting patient on broad-spectrum antibiotics and admitting to medicine for clearance prior to surgery potentially later today. I spoke with the admitting team who is in agreement with plan for admission. Patient and family are in agreement with plan for admission. Patient will be cared for in the ED prior to a bed becoming available upstairs     This patient was also evaluated by the attending physician. All care plans were discussed and agreed upon. Clinical Impression     1.  Infected wound        Disposition     DISPOSITION Decision To Admit 11/21/2022 11:34:17 AM       Noemi Elizalde PA-C  11/21/22 [id number]

## 2022-11-21 NOTE — H&P
Hospital Medicine History & Physical      PCP: No primary care provider on file. Date of Admission: 11/21/2022    Chief Complaint:  Left foot pain and ulcer       History Of Present Illness:       81 y.o. male with PMH of DM, HTN, HLD who presents with left foot pain. Patient reports that he had a surgery on his left foot back in September for a bone spur. He states that his sutures were removed at a postoperative visit 10 days later. He states that the wound opened up afterwards and has progressively worsened. He reports chronic pain in the foot, worse at night, and for which he has been taking oxycodone. He denies fevers, nausea or vomiting or other systemic symptoms. He reports that his diabetes is well controlled. Past Medical History:      History reviewed. No pertinent past medical history. Past Surgical History:      History reviewed. No pertinent surgical history. Medications Prior to Admission:      Prior to Admission medications    Medication Sig Start Date End Date Taking? Authorizing Provider   aspirin 81 MG EC tablet Take 81 mg by mouth daily   Yes Historical Provider, MD   glipiZIDE (GLUCOTROL) 5 MG tablet Take 5 mg by mouth every morning (before breakfast)   Yes Historical Provider, MD   clopidogrel (PLAVIX) 75 MG tablet Take 75 mg by mouth daily   Yes Historical Provider, MD   losartan (COZAAR) 25 MG tablet Take 25 mg by mouth daily   Yes Historical Provider, MD   metFORMIN (GLUCOPHAGE) 500 MG tablet See Admin Instructions Take 2 tablets by mouth every morning and 1 tablet by mouth every night at bedtime   Yes Historical Provider, MD   rosuvastatin (CRESTOR) 20 MG tablet Take 20 mg by mouth daily   Yes Historical Provider, MD       Allergies:  Penicillins    Social History:      TOBACCO:   reports that he has never smoked. He has never used smokeless tobacco.  ETOH:   reports that he does not currently use alcohol.   E-cigarette/Vaping       Questions Responses E-cigarette/Vaping Use     Start Date     Passive Exposure     Quit Date     Counseling Given     Comments               Family History:     Reviewed and negative in regards to presenting illness/complaint. History reviewed. No pertinent family history. REVIEW OF SYSTEMS COMPLETED:   Pertinent positives as noted in the HPI. All other systems reviewed and negative. PHYSICAL EXAM PERFORMED:    BP (!) 151/72   Pulse 84   Temp 98.2 °F (36.8 °C) (Oral)   Resp 18   Ht 5' 9\" (1.753 m)   Wt 215 lb 14.4 oz (97.9 kg)   SpO2 100%   BMI 31.88 kg/m²     General appearance:  No apparent distress, appears stated age and cooperative. HEENT:  Normal cephalic, atraumatic without obvious deformity. Pupils equal, round, and reactive to light. Extra ocular muscles intact. Conjunctivae/corneas clear. Neck: Supple, with full range of motion. No jugular venous distention. Trachea midline. Respiratory:  Normal respiratory effort. Clear to auscultation, bilaterally without Rales/Wheezes/Rhonchi. Cardiovascular:  Regular rate and rhythm with normal S1/S2 without murmurs, rubs or gallops. Abdomen: Soft, non-tender, non-distended with normal bowel sounds. Musculoskeletal:  No clubbing, cyanosis or edema bilaterally. Full range of motion without deformity. Skin: Skin color, texture, turgor normal.  No rashes or lesions. Neurologic:  Neurovascularly intact without any focal sensory/motor deficits. Cranial nerves: II-XII intact, grossly non-focal.  Psychiatric:  Alert and oriented, thought content appropriate, normal insight  Capillary Refill: Brisk,3 seconds, normal  Peripheral Pulses: +2 palpable, equal bilaterally       Labs:     Recent Labs     11/21/22  1130   WBC 12.1*   HGB 12.9*   HCT 38.2*        Recent Labs     11/21/22  1130      K 4.3   CL 99   CO2 27   BUN 22*   CREATININE 1.1   CALCIUM 9.9     No results for input(s): AST, ALT, BILIDIR, BILITOT, ALKPHOS in the last 72 hours.   No results for input(s): INR in the last 72 hours. No results for input(s): Sita Fuse in the last 72 hours. Urinalysis:    No results found for: Gilbert Escobar, BACTERIA, [address] [facility], [place], [address], [place] [facility]    Radiology:     CXR: I have reviewed the CXR   EKG:  I have reviewed the EKG     XR FOOT LEFT (MIN 3 VIEWS)   Final Result   1. Lytic area along the base of the fifth metatarsal suspicious for possible osteomyelitis. Clinical correlation recommended. Consults:    IP CONSULT TO PODIATRY  IP CONSULT TO HOSPITALIST  PHARMACY TO DOSE VANCOMYCIN    ASSESSMENT:    Active Hospital Problems    Diagnosis Date Noted    Type 2 diabetes mellitus with left diabetic foot ulcer ([facility] [J10.907, L97.529] 11/21/2022     Priority: Medium     -Left foot diabetic ulcer with suspected Osteomyelitis     Patient is at low cardiovascular risk for podiatry procedure. Advised to proceed with the surgery. -DM type 2  -Essential Hypertension   -Dyslipidemia    PLAN:    Admit patient to medsurg  IV fluid  IV vanco plus meropenem  Obtain podiatry consult   Insulin aspart sliding scale. IV labetalol prn  C/w home meds       DVT Prophylaxis: SCD  Diet: Diet NPO  Code Status: No Order    PT/OT Eval Status:     Dispo - Home after clinical improvement        Hayes Valiente MD    Thank you No primary care provider on file. for the opportunity to be involved in this patient's care. If you have any questions or concerns please feel free to contact me at [phone].

## 2022-11-21 NOTE — CONSULTS
Clinical Pharmacy Consult Note    Vancomycin - Management by Pharmacy    Consult Date(s): 11/21/22  Consulting Provider(s): Dr. Jessenia Mello / Plan  Bone/Joint Infection - Vancomycin  Concurrent Antimicrobials: Cefepime (Day #1)  Day of Vanc Therapy / Ordered Duration: Day #1  Current Dosing Method: Bayesian-Guided AUC Dosing  Therapeutic Goal: -600 mg/L*hr  Current Dose / Plan:   Patient received vancomycin 2000 mg IV x 1 dose today at 12:55 PM (~20 mg/kg load) while in emergency department  SCr obtained today of 1.1 mg/dL. Baseline SCr unknown at this time  Will order maintenance regimen of 1500 mg IV q24h to begin tomorrow morning (11/22)  Selected regimen predicts AUC of 432 mg/L*hr with steady state trough of 12.2 mg/L  Plan to obtain a random level in 1-2 days or sooner if clinically indicated (not yet ordered)  Will continue to monitor clinical condition and make adjustments to regimen as appropriate    Thank you for consulting Pharmacy! Nicho Arora, PharmD, [facility]  Clinical Pharmacist - Emergency Dept  Wireless: [phone]  11/21/2022 3:21 PM      Subjective/Objective: Iris Cid is a 81 y.o. male with a PMHx significant for DM, HTN, and HLD who is admitted with L foot wound, concerning for potential bone/joint infection. Pharmacy is consulted to dose vancomycin. Ht Readings from Last 1 Encounters:   11/21/22 5' 9\" (1.753 m)     Wt Readings from Last 1 Encounters:   11/21/22 215 lb 14.4 oz (97.9 kg)     Current & Prior Antimicrobial Regimen(s):  Cefepime 2 g IV q8h EI (11/21-present)  Vancomycin PTD  2000 mg IV x1 dose (11/21)  1500 mg IV q24h (11/22-present)    Vancomycin Level(s) / Doses:  Date Time Dose Type of Level / Level Interpretation   11/21 12:55 2000 mg IV x1 -- Loading dose of 20 mg/kg          Note: Serum levels collected for AUC-based dosing may be high if collected in close proximity to the dose administered.  This is not necessarily indicative of toxicity. Cultures & Sensitivities:   Date Site Micro Susceptibility / Result   11/21 MRSA nares Ordered Pending           Recent Labs     11/21/22  1130   CREATININE 1.1   BUN 22*   WBC 12.1*     Estimated Creatinine Clearance: 72 mL/min (based on SCr of 1.1 mg/dL). Additional Lab Values / Findings of Note:  No results for input(s): PROCAL in the last 72 hours.

## 2022-11-21 NOTE — CONSULTS
Department of Podiatry Consult Note  Resident       Reason for Consult:  surgical work up for left lower extremity wound    Requesting Physician:  Dr. Shahrzad Ledezma MD    CHIEF COMPLAINT:  Non-healing post operative wound with concern for infection. HISTORY OF PRESENT ILLNESS:                The patient is a 81 y.o. male with significant past medical history as listed below. Podiatry was consulted for a non-healing left lateral foot wound following prior podiatric procedure in which an ostectomy of hypertrophic bone was performed. The patient relates that around the time the sutures were removed that the wound began to break down. The patient relates that he has been dressing the wound daily with peroxide. Patient relates that the wound is exquisitely tender and that he has been taking oxycodone for pain control. The patient follows with Dr. Kay Fay DPM who requested that the patient present to the emergency room for surgical intervention later today. Patient denies fever, chills, nausea, vomiting, shortness of breath, chest pain. Patient has no other pedal complaints at this time. Past Medical History:    History reviewed. No pertinent past medical history. Past Surgical History:    History reviewed. No pertinent surgical history. Allergies:   Penicillins    Medications:   Home Meds  No current facility-administered medications on file prior to encounter.      Current Outpatient Medications on File Prior to Encounter   Medication Sig Dispense Refill    aspirin 81 MG EC tablet Take 81 mg by mouth daily      glipiZIDE (GLUCOTROL) 5 MG tablet Take 5 mg by mouth every morning (before breakfast)      clopidogrel (PLAVIX) 75 MG tablet Take 75 mg by mouth daily      losartan (COZAAR) 25 MG tablet Take 25 mg by mouth daily      metFORMIN (GLUCOPHAGE) 500 MG tablet See Admin Instructions Take 2 tablets by mouth every morning and 1 tablet by mouth every night at bedtime      rosuvastatin (CRESTOR) 20 MG tablet Take 20 mg by mouth daily         Current Meds  cefepime (MAXIPIME) 2000 mg IVPB minibag, Once  vancomycin (VANCOCIN) 2,000 mg in dextrose 5 % 500 mL IVPB, Once        Family History:   History reviewed. No pertinent family history. Social History:   TOBACCO:   reports that he has never smoked. He has never used smokeless tobacco.  ETOH:   reports that he does not currently use alcohol. DRUGS:   has no history on file for drug use. REVIEW OF SYSTEMS:    As above. PHYSICAL EXAM:      Vitals:    BP (!) 151/72   Pulse 84   Temp 98.2 °F (36.8 °C) (Oral)   Resp 18   Ht 5' 9\" (1.753 m)   Wt 215 lb 14.4 oz (97.9 kg)   SpO2 100%   BMI 31.88 kg/m²     LABS:   Recent Labs     11/21/22  1130   WBC 12.1*   HGB 12.9*   HCT 38.2*        Recent Labs     11/21/22  1130      K 4.3   CL 99   CO2 27   BUN 22*   CREATININE 1.1     No results for input(s): PROT, INR, APTT in the last 72 hours. VASCULAR: DP and PT pulses faintly palpable 1/4, upon handheld doppler examination pulses were noted to be triphasic b/l. CFT is brisk to the digits of the foot b/l. Skin temperature is warm to cool from proximal to distal with focal calor noted to the lateral 5th metatarsal base left lower extremity. Focal edema noted along the lateral 5th metatarsal.  No pain with calf compression b/l. NEUROLOGIC: Gross and epicritic sensation is intact b/l. Protective sensation is intact at all pedal sites b/l. DERMATOLOGIC:   Left lower extremity:  Verbal consent obtained prior to photo's taken 11/21/22: Full thickness ulceration noted to the lateral aspect along the 5th metatarsal base along previous surgical incision site. The wound base is mixed fibrotic and granular with rolled, shiny wound edges. There is no purulent drainage, fluctuance, malodor, crepitus noted. The wound does not tunnel, track, probe to bone. Wound measures 5 cm x 2 cm x 0.2 cm.  Significant edwar wound erythema noted with violaceous changes appreciated to the plantar aspect of the wound. Right lower extremity is a closed soft tissue envelope without evidence of soft tissue break down or infection. MUSCULOSKELETAL: Muscle strength is 5/5 for all pedal groups tested. Ankle joint ROM is decreased in dorsiflexion with the knee extended. No obvious biomechanical abnormalities. Pain on palpation noted to the plantar distal aspect of the wound left lower extremity. IMAGING:  Narrative   History: Left foot pain. 3 views of foot. FINDINGS: Mineralization appears to be intact. There appears to be lysis the base of the fifth metatarsal suspicious for osteomyelitis. No fracture identified. Calcaneal enthesopathy. Vascular calcifications. No radiopaque foreign body. Impression   1. Lytic area along the base of the fifth metatarsal suspicious for possible osteomyelitis. Clinical correlation recommended. While lytic area appreciated on xray imaging, findings are consistent with previous post operative changes made with history of recent ostectomy in September to area of concern. While osteomyelitis can not be definitively ruled out, greater correlation with recent surgical intervention. IMPRESSION/RECOMMENDATIONS:    -Chronic non-healing surgical wound  -S/P 5th metatarsal ostectomy (September 22)  - Diabetic foot infection  - Diabetes type II    -Patient examined and evaluated at the bedside   -Hypertensive otherwise VSS. Leukocytosis noted (12.1). -ESR 26 CRP 17.3  -X-rays reviewed and impressions noted above. -Verbal consent obtained prior to debridement of left lateral foot ulceration utilizing a sterile #15 blade. Approximately 1 cc of bleeding appreciated and hemostasis achieved with direct compression.   -Dressing applied to the left lower extremity consisting of betadine, gauze, kerlix, ACE. -Antibiotics started; IV Vancomycin, IV zosyn  -no culture obtained 2/2 no purulent drainage appreciated.  Will likely obtain intraoperative culture for more appropriate antibiotic selection.   -Non-weightbearing to the left lower extremity  -Elevate left LE  -surgical shoe ordered  -Pt/ot consult to be placed following surgical intervention.   -Due to failed outpatient conservative treatment with regular wound debridement and oral antibiotic therapies is recommended that patient undergo surgical intervention at this time for management of postoperative infection left lower extremity. Plan for surgical intervention today pending OR and surgeon availability. Consent obtained in the emergency department.  -Please admit to medicine team for medical management of patient's home medications. Appreciate medical clearance and risk stratification prior to procedure later tomorrow at 7:30 am  -Patient  n.p.o.  -Please hold anticoagulants at this time  -Discussed surgical intervention with patient at bedside. All potential risks, benefits, and complications were discussed with the patient prior to the scheduling of surgery. No guarantees or promises were made to what the outcomes will be. The patient wished to proceed with surgery, and informed written consent was obtained, signed, and placed on the patient's chart. DISPO: Chronic nonhealing surgical wound left lateral lower extremity. Concern for postoperative infection. Labs and imaging reviewed. Plan for surgical intervention tomorrow morning pending OR and surgeon availability. Verbal consent obtained in the emergency department. Patient n.p.o., please hold anticoagulations. Appreciate medical clearance and risk stratification per primary team. Patient will be ok to discharge tomorrow following procedure.      - The patient will be staffed with Dr. Marylin Gutierres [place], [place]  11/21/22  12:11 PM

## 2022-11-21 NOTE — CONSULTS
Clinical Pharmacy Consult Note    Admit date: 11/21/2022   Patient presents to the ED with complaints of L foot pain, concerning for SSTI. Pharmacy is consulted to dose Vancomycin x1 dose in ED per Juana Cochran PA-C.     Ht: 175.3 cm   Wt: 97.9 kg    Assessment/Plan:  Will order Vancomycin 2000 mg IV x1 (~20 mg/kg) for administration in ED per ER pharmacy consult  If Vancomycin is to continue on admission and pharmacy is to manage dosing, please re-consult with admission orders      Thanks--  Magi Cheema PharmD  Clinical Pharmacist - Emergency Dept  Wireless: [phone]  11/21/2022 11:31 AM

## 2022-11-21 NOTE — ED PROVIDER NOTES
ED Attending Attestation Note     Date of evaluation: 11/21/2022    This patient was seen by the TRISH. I have seen and examined the patient, agree with the workup, evaluation, management and diagnosis. The care plan has been discussed. I was present for any procedures performed in the TRISH's note and have made edits to the note where appropriate. My assessment reveals 81 y.o. male with history of calcaneal bone spur resection complicated by nonhealing surgical incision site presenting to the emergency department today for concerns of wound infection. Was referred in by podiatry. Here he has an open wound at the lateral aspect of the foot without clinical signs of necrotizing fasciitis. X-ray with possible osteomyelitis. He is otherwise hemodynamically stable, inflammatory markers mildly elevated. Broad-spectrum antibiotics have been started at the request of podiatry. Will admit to medicine for surgical clearance and ongoing care.             Navjot Nava MD  11/21/22 [id number]

## 2022-11-21 NOTE — PROGRESS NOTES
Pharmacy Note - Extended Infusion Beta-Lactam Adjustment    Cefepime ordered for treatment of suspected bone/joint infection. Per [facility] Extended Infusion Beta-Lactam Policy, cefepime will be changed to 2000 mg IV q8h administered over 240 minutes (extended-infusion, maintenance dose) as patient already received loading dose earlier in the day. Estimated Creatinine Clearance: Estimated Creatinine Clearance: 72 mL/min (based on SCr of 1.1 mg/dL). BMI: Body mass index is 31.88 kg/m². Rationale for Adjustment: Agent demonstrates time-dependent effect on bacterial eradication. Extended-infusion dosing strategy aims to enhance microbiologic and clinical efficacy. Pharmacy will continue to monitor renal function and adjust dose as necessary. Please call with any questions.     Charmayne Deck, PharmD  Clinical Pharmacist - Emergency Dept  Wireless: [phone]  11/21/2022 3:13 PM

## 2022-11-21 NOTE — PROGRESS NOTES
Pre-Operative Note  Resident Note     Patient: Fifi Vazquez     Procedure: incision and drainage with left 5th metatarsal ostectomy and delayed primary closure with possible graft application. Clearance for surgery: Yes, per Internal Medicine    Consent: Procedure was discussed at length with patient and all questions were answered to completion, consent obtained and placed in chart     Labs:        Recent Labs     11/21/22  1130   WBC 12.1*   HGB 12.9*   HCT 38.2*   MCV 88.9           Recent Labs     11/21/22  1130      K 4.3   CL 99   CO2 27   BUN 22*   CREATININE 1.1      No results for input(s): AST, ALT, ALB, BILIDIR, BILITOT, ALKPHOS in the last 72 hours. No results for input(s): LIPASE, AMYLASE in the last 72 hours. No results for input(s): PROT, INR, APTT in the last 72 hours. No results for input(s): CKTOTAL, CKMB, CKMBINDEX, TROPONINI in the last 72 hours.     Pre-op Medications:   Current Facility-Administered Medications   Medication Dose Route Frequency Provider Last Rate Last Admin    vancomycin (VANCOCIN) 2,000 mg in dextrose 5 % 500 mL IVPB  20 mg/kg IntraVENous Once Kiera Jeannie, PA-C 250 mL/hr at 11/21/22 1255 2,000 mg at 11/21/22 1255    labetalol (NORMODYNE;TRANDATE) injection 10 mg  10 mg IntraVENous Q4H PRN Scott Prieto MD        insulin lispro (1 Unit Dial) (HUMALOG/ADMELOG) pen 0-4 Units  0-4 Units SubCUTAneous TID WC Scott Prieto MD        insulin lispro (1 Unit Dial) (HUMALOG/ADMELOG) pen 0-4 Units  0-4 Units SubCUTAneous Nightly Scott Prieto MD         Current Outpatient Medications   Medication Sig Dispense Refill    aspirin 81 MG EC tablet Take 81 mg by mouth daily      glipiZIDE (GLUCOTROL) 5 MG tablet Take 5 mg by mouth every morning (before breakfast)      clopidogrel (PLAVIX) 75 MG tablet Take 75 mg by mouth daily      losartan (COZAAR) 25 MG tablet Take 25 mg by mouth daily      metFORMIN (GLUCOPHAGE) 500 MG tablet See Admin Instructions Take 2 tablets by mouth every morning and 1 tablet by mouth every night at bedtime      rosuvastatin (CRESTOR) 20 MG tablet Take 20 mg by mouth daily         Diet: Diet NPO    CXR: mild cardiac enlargement      EKG:  see cardiology section      Echocardiogram: not done    IV access/ saline lock: yes    Antibiotics: IV Vancomycin and IV cefepime    PT/INR: 14.7/1.16    Type and Screen: O positive,  antibody negative negative    Pregnancy test: not indicated    COVID-19: Not Tested    Known risk factors for perioperative complications: Diabetes mellitus      Anesthesia to see patient.     Magda Busby DPM   Podiatric Resident PGY1  Pager [phone] or Perfect[employer]erve  11/21/2022, 1:35 PM

## 2022-11-21 NOTE — PROGRESS NOTES
Call made to lab to obtain blood cultures. Per  they will come when they can. MRSA swab obtained and sent to lab.

## 2022-11-21 NOTE — ED NOTES
Lab contacted extra blue and pink tube sent with original ordered labs.       Jodi Lacey RN  11/21/22 2926

## 2022-11-22 ENCOUNTER — APPOINTMENT (OUTPATIENT)
Dept: GENERAL RADIOLOGY | Age: 72
DRG: 629 | End: 2022-11-22
Payer: MEDICARE

## 2022-11-22 ENCOUNTER — ANESTHESIA EVENT (OUTPATIENT)
Dept: OPERATING ROOM | Age: 72
DRG: 629 | End: 2022-11-22
Payer: MEDICARE

## 2022-11-22 ENCOUNTER — ANESTHESIA (OUTPATIENT)
Dept: OPERATING ROOM | Age: 72
DRG: 629 | End: 2022-11-22
Payer: MEDICARE

## 2022-11-22 VITALS
SYSTOLIC BLOOD PRESSURE: 139 MMHG | OXYGEN SATURATION: 99 % | HEART RATE: 88 BPM | HEIGHT: 69 IN | BODY MASS INDEX: 31.71 KG/M2 | RESPIRATION RATE: 16 BRPM | WEIGHT: 214.07 LBS | DIASTOLIC BLOOD PRESSURE: 70 MMHG | TEMPERATURE: 98.6 F

## 2022-11-22 LAB
GLUCOSE BLD-MCNC: 137 MG/DL (ref 70–99)
MRSA SCREEN RT-PCR: NORMAL
PERFORMED ON: ABNORMAL

## 2022-11-22 PROCEDURE — 6360000002 HC RX W HCPCS: Performed by: FAMILY MEDICINE

## 2022-11-22 PROCEDURE — 3600000004 HC SURGERY LEVEL 4 BASE: Performed by: PODIATRIST

## 2022-11-22 PROCEDURE — 2580000003 HC RX 258: Performed by: PODIATRIST

## 2022-11-22 PROCEDURE — 6360000002 HC RX W HCPCS: Performed by: NURSE ANESTHETIST, CERTIFIED REGISTERED

## 2022-11-22 PROCEDURE — 3600000014 HC SURGERY LEVEL 4 ADDTL 15MIN: Performed by: PODIATRIST

## 2022-11-22 PROCEDURE — A4217 STERILE WATER/SALINE, 500 ML: HCPCS | Performed by: PODIATRIST

## 2022-11-22 PROCEDURE — 7100000000 HC PACU RECOVERY - FIRST 15 MIN: Performed by: PODIATRIST

## 2022-11-22 PROCEDURE — 3700000001 HC ADD 15 MINUTES (ANESTHESIA): Performed by: PODIATRIST

## 2022-11-22 PROCEDURE — 3700000000 HC ANESTHESIA ATTENDED CARE: Performed by: PODIATRIST

## 2022-11-22 PROCEDURE — 6370000000 HC RX 637 (ALT 250 FOR IP): Performed by: FAMILY MEDICINE

## 2022-11-22 PROCEDURE — 0QBP0ZZ EXCISION OF LEFT METATARSAL, OPEN APPROACH: ICD-10-PCS | Performed by: PODIATRIST

## 2022-11-22 PROCEDURE — 73630 X-RAY EXAM OF FOOT: CPT

## 2022-11-22 PROCEDURE — 2500000003 HC RX 250 WO HCPCS: Performed by: PODIATRIST

## 2022-11-22 PROCEDURE — 2500000003 HC RX 250 WO HCPCS: Performed by: NURSE ANESTHETIST, CERTIFIED REGISTERED

## 2022-11-22 PROCEDURE — 2580000003 HC RX 258: Performed by: FAMILY MEDICINE

## 2022-11-22 PROCEDURE — 2580000003 HC RX 258: Performed by: INTERNAL MEDICINE

## 2022-11-22 PROCEDURE — 88311 DECALCIFY TISSUE: CPT

## 2022-11-22 PROCEDURE — 6360000002 HC RX W HCPCS: Performed by: INTERNAL MEDICINE

## 2022-11-22 PROCEDURE — 6370000000 HC RX 637 (ALT 250 FOR IP): Performed by: INTERNAL MEDICINE

## 2022-11-22 PROCEDURE — 88305 TISSUE EXAM BY PATHOLOGIST: CPT

## 2022-11-22 PROCEDURE — 7100000001 HC PACU RECOVERY - ADDTL 15 MIN: Performed by: PODIATRIST

## 2022-11-22 PROCEDURE — 2709999900 HC NON-CHARGEABLE SUPPLY: Performed by: PODIATRIST

## 2022-11-22 PROCEDURE — 2580000003 HC RX 258: Performed by: NURSE ANESTHETIST, CERTIFIED REGISTERED

## 2022-11-22 RX ORDER — MAGNESIUM HYDROXIDE 1200 MG/15ML
LIQUID ORAL CONTINUOUS PRN
Status: DISCONTINUED | OUTPATIENT
Start: 2022-11-22 | End: 2022-11-22 | Stop reason: HOSPADM

## 2022-11-22 RX ORDER — ONDANSETRON 2 MG/ML
INJECTION INTRAMUSCULAR; INTRAVENOUS PRN
Status: DISCONTINUED | OUTPATIENT
Start: 2022-11-22 | End: 2022-11-22 | Stop reason: SDUPTHER

## 2022-11-22 RX ORDER — FENTANYL CITRATE 50 UG/ML
25 INJECTION, SOLUTION INTRAMUSCULAR; INTRAVENOUS EVERY 5 MIN PRN
Status: DISCONTINUED | OUTPATIENT
Start: 2022-11-22 | End: 2022-11-22 | Stop reason: HOSPADM

## 2022-11-22 RX ORDER — SODIUM CHLORIDE 9 MG/ML
25 INJECTION, SOLUTION INTRAVENOUS PRN
Status: DISCONTINUED | OUTPATIENT
Start: 2022-11-22 | End: 2022-11-22 | Stop reason: HOSPADM

## 2022-11-22 RX ORDER — OXYCODONE HYDROCHLORIDE 5 MG/1
10 TABLET ORAL PRN
Status: DISCONTINUED | OUTPATIENT
Start: 2022-11-22 | End: 2022-11-22 | Stop reason: HOSPADM

## 2022-11-22 RX ORDER — FENTANYL CITRATE 50 UG/ML
INJECTION, SOLUTION INTRAMUSCULAR; INTRAVENOUS PRN
Status: DISCONTINUED | OUTPATIENT
Start: 2022-11-22 | End: 2022-11-22 | Stop reason: SDUPTHER

## 2022-11-22 RX ORDER — OXYCODONE HYDROCHLORIDE 5 MG/1
10 TABLET ORAL ONCE
Status: COMPLETED | OUTPATIENT
Start: 2022-11-22 | End: 2022-11-22

## 2022-11-22 RX ORDER — LIDOCAINE HYDROCHLORIDE 10 MG/ML
INJECTION, SOLUTION EPIDURAL; INFILTRATION; INTRACAUDAL; PERINEURAL PRN
Status: DISCONTINUED | OUTPATIENT
Start: 2022-11-22 | End: 2022-11-22 | Stop reason: HOSPADM

## 2022-11-22 RX ORDER — SODIUM CHLORIDE 0.9 % (FLUSH) 0.9 %
5-40 SYRINGE (ML) INJECTION EVERY 12 HOURS SCHEDULED
Status: DISCONTINUED | OUTPATIENT
Start: 2022-11-22 | End: 2022-11-22 | Stop reason: HOSPADM

## 2022-11-22 RX ORDER — LABETALOL HYDROCHLORIDE 5 MG/ML
10 INJECTION, SOLUTION INTRAVENOUS
Status: DISCONTINUED | OUTPATIENT
Start: 2022-11-22 | End: 2022-11-22 | Stop reason: HOSPADM

## 2022-11-22 RX ORDER — OXYCODONE AND ACETAMINOPHEN 7.5; 325 MG/1; MG/1
1 TABLET ORAL EVERY 6 HOURS PRN
Qty: 28 TABLET | Refills: 0 | Status: SHIPPED | OUTPATIENT
Start: 2022-11-22 | End: 2022-11-29

## 2022-11-22 RX ORDER — ONDANSETRON 2 MG/ML
4 INJECTION INTRAMUSCULAR; INTRAVENOUS
Status: DISCONTINUED | OUTPATIENT
Start: 2022-11-22 | End: 2022-11-22 | Stop reason: HOSPADM

## 2022-11-22 RX ORDER — MEPERIDINE HYDROCHLORIDE 25 MG/ML
12.5 INJECTION INTRAMUSCULAR; INTRAVENOUS; SUBCUTANEOUS EVERY 5 MIN PRN
Status: DISCONTINUED | OUTPATIENT
Start: 2022-11-22 | End: 2022-11-22 | Stop reason: HOSPADM

## 2022-11-22 RX ORDER — PROCHLORPERAZINE EDISYLATE 5 MG/ML
5 INJECTION INTRAMUSCULAR; INTRAVENOUS
Status: DISCONTINUED | OUTPATIENT
Start: 2022-11-22 | End: 2022-11-22 | Stop reason: HOSPADM

## 2022-11-22 RX ORDER — DIPHENHYDRAMINE HYDROCHLORIDE 50 MG/ML
12.5 INJECTION INTRAMUSCULAR; INTRAVENOUS
Status: DISCONTINUED | OUTPATIENT
Start: 2022-11-22 | End: 2022-11-22 | Stop reason: HOSPADM

## 2022-11-22 RX ORDER — SODIUM CHLORIDE 9 MG/ML
INJECTION, SOLUTION INTRAVENOUS CONTINUOUS
Status: DISCONTINUED | OUTPATIENT
Start: 2022-11-22 | End: 2022-11-22 | Stop reason: HOSPADM

## 2022-11-22 RX ORDER — OXYCODONE HYDROCHLORIDE 5 MG/1
5 TABLET ORAL PRN
Status: DISCONTINUED | OUTPATIENT
Start: 2022-11-22 | End: 2022-11-22 | Stop reason: HOSPADM

## 2022-11-22 RX ORDER — KETAMINE HCL IN NACL, ISO-OSM 20 MG/2 ML
SYRINGE (ML) INJECTION PRN
Status: DISCONTINUED | OUTPATIENT
Start: 2022-11-22 | End: 2022-11-22 | Stop reason: SDUPTHER

## 2022-11-22 RX ORDER — LORAZEPAM 2 MG/ML
0.5 INJECTION INTRAMUSCULAR
Status: DISCONTINUED | OUTPATIENT
Start: 2022-11-22 | End: 2022-11-22 | Stop reason: HOSPADM

## 2022-11-22 RX ORDER — PROPOFOL 10 MG/ML
INJECTION, EMULSION INTRAVENOUS PRN
Status: DISCONTINUED | OUTPATIENT
Start: 2022-11-22 | End: 2022-11-22 | Stop reason: SDUPTHER

## 2022-11-22 RX ORDER — LIDOCAINE HYDROCHLORIDE 20 MG/ML
INJECTION, SOLUTION INTRAVENOUS PRN
Status: DISCONTINUED | OUTPATIENT
Start: 2022-11-22 | End: 2022-11-22 | Stop reason: SDUPTHER

## 2022-11-22 RX ORDER — SODIUM CHLORIDE 0.9 % (FLUSH) 0.9 %
5-40 SYRINGE (ML) INJECTION PRN
Status: DISCONTINUED | OUTPATIENT
Start: 2022-11-22 | End: 2022-11-22 | Stop reason: HOSPADM

## 2022-11-22 RX ORDER — SODIUM CHLORIDE, SODIUM LACTATE, POTASSIUM CHLORIDE, CALCIUM CHLORIDE 600; 310; 30; 20 MG/100ML; MG/100ML; MG/100ML; MG/100ML
INJECTION, SOLUTION INTRAVENOUS CONTINUOUS PRN
Status: DISCONTINUED | OUTPATIENT
Start: 2022-11-22 | End: 2022-11-22 | Stop reason: SDUPTHER

## 2022-11-22 RX ORDER — BUPIVACAINE HYDROCHLORIDE 5 MG/ML
INJECTION, SOLUTION EPIDURAL; INTRACAUDAL PRN
Status: DISCONTINUED | OUTPATIENT
Start: 2022-11-22 | End: 2022-11-22 | Stop reason: ALTCHOICE

## 2022-11-22 RX ADMIN — ROSUVASTATIN CALCIUM 20 MG: 20 TABLET, FILM COATED ORAL at 09:58

## 2022-11-22 RX ADMIN — FENTANYL CITRATE 50 MCG: 50 INJECTION, SOLUTION INTRAMUSCULAR; INTRAVENOUS at 07:48

## 2022-11-22 RX ADMIN — CLOPIDOGREL BISULFATE 75 MG: 75 TABLET ORAL at 09:58

## 2022-11-22 RX ADMIN — CEFEPIME HYDROCHLORIDE 2000 MG: 2 INJECTION, POWDER, FOR SOLUTION INTRAMUSCULAR; INTRAVENOUS at 11:52

## 2022-11-22 RX ADMIN — VANCOMYCIN HYDROCHLORIDE 1500 MG: 10 INJECTION, POWDER, LYOPHILIZED, FOR SOLUTION INTRAVENOUS at 10:04

## 2022-11-22 RX ADMIN — HYDROMORPHONE HYDROCHLORIDE 0.5 MG: 1 INJECTION, SOLUTION INTRAMUSCULAR; INTRAVENOUS; SUBCUTANEOUS at 09:24

## 2022-11-22 RX ADMIN — ASPIRIN 81 MG: 81 TABLET, COATED ORAL at 09:58

## 2022-11-22 RX ADMIN — Medication 20 MG: at 07:32

## 2022-11-22 RX ADMIN — OXYCODONE 10 MG: 5 TABLET ORAL at 15:37

## 2022-11-22 RX ADMIN — OXYCODONE HYDROCHLORIDE 10 MG: 5 TABLET ORAL at 09:57

## 2022-11-22 RX ADMIN — FENTANYL CITRATE 25 MCG: 50 INJECTION, SOLUTION INTRAMUSCULAR; INTRAVENOUS at 09:02

## 2022-11-22 RX ADMIN — SODIUM CHLORIDE: 9 INJECTION, SOLUTION INTRAVENOUS at 10:06

## 2022-11-22 RX ADMIN — FENTANYL CITRATE 50 MCG: 50 INJECTION, SOLUTION INTRAMUSCULAR; INTRAVENOUS at 07:44

## 2022-11-22 RX ADMIN — HYDROMORPHONE HYDROCHLORIDE 0.5 MG: 1 INJECTION, SOLUTION INTRAMUSCULAR; INTRAVENOUS; SUBCUTANEOUS at 09:17

## 2022-11-22 RX ADMIN — ONDANSETRON 4 MG: 2 INJECTION INTRAMUSCULAR; INTRAVENOUS at 07:42

## 2022-11-22 RX ADMIN — PROPOFOL 200 MG: 10 INJECTION, EMULSION INTRAVENOUS at 07:36

## 2022-11-22 RX ADMIN — LOSARTAN POTASSIUM 25 MG: 50 TABLET, FILM COATED ORAL at 09:58

## 2022-11-22 RX ADMIN — SODIUM CHLORIDE, SODIUM LACTATE, POTASSIUM CHLORIDE, AND CALCIUM CHLORIDE: .6; .31; .03; .02 INJECTION, SOLUTION INTRAVENOUS at 07:14

## 2022-11-22 RX ADMIN — LIDOCAINE HYDROCHLORIDE 100 MG: 20 INJECTION, SOLUTION INTRAVENOUS at 07:36

## 2022-11-22 RX ADMIN — CEFEPIME HYDROCHLORIDE 2000 MG: 2 INJECTION, POWDER, FOR SOLUTION INTRAMUSCULAR; INTRAVENOUS at 03:20

## 2022-11-22 RX ADMIN — SODIUM CHLORIDE, PRESERVATIVE FREE 10 ML: 5 INJECTION INTRAVENOUS at 10:00

## 2022-11-22 ASSESSMENT — PAIN DESCRIPTION - ORIENTATION
ORIENTATION: LEFT

## 2022-11-22 ASSESSMENT — PAIN SCALES - GENERAL
PAINLEVEL_OUTOF10: 7
PAINLEVEL_OUTOF10: 6
PAINLEVEL_OUTOF10: 5
PAINLEVEL_OUTOF10: 0
PAINLEVEL_OUTOF10: 7
PAINLEVEL_OUTOF10: 0
PAINLEVEL_OUTOF10: 0
PAINLEVEL_OUTOF10: 8

## 2022-11-22 ASSESSMENT — PAIN DESCRIPTION - FREQUENCY
FREQUENCY: CONTINUOUS

## 2022-11-22 ASSESSMENT — PAIN DESCRIPTION - LOCATION
LOCATION: FOOT

## 2022-11-22 ASSESSMENT — PAIN DESCRIPTION - DESCRIPTORS
DESCRIPTORS: BURNING

## 2022-11-22 ASSESSMENT — PAIN DESCRIPTION - PAIN TYPE
TYPE: SURGICAL PAIN

## 2022-11-22 ASSESSMENT — PAIN DESCRIPTION - ONSET
ONSET: ON-GOING

## 2022-11-22 ASSESSMENT — PAIN - FUNCTIONAL ASSESSMENT: PAIN_FUNCTIONAL_ASSESSMENT: ACTIVITIES ARE NOT PREVENTED

## 2022-11-22 NOTE — PROGRESS NOTES
Pt arrived to unit, very upset at the admission situation and not having surgery today. Pt aware of POC for surgery tomorrow. Denies pain. Educated to call light and NWB order. All needs addressed. Pt denies questions at this time. Call light in reach.

## 2022-11-22 NOTE — PROGRESS NOTES
Consent form taken in to pt for signature, pt declined signing consent at this time and would like to talk to the doctor again before signing. OR control notified of needed consent.

## 2022-11-22 NOTE — PROGRESS NOTES
Clinical Pharmacy Consult Note    Vancomycin - Management by Pharmacy    Consult Date(s): 11/21/22  Consulting Provider(s): Dr. Chip Lamb / Plan  1)  Left diabetic foot infection - Vancomycin  Concurrent Antimicrobials: Cefepime  Day of Vanc Therapy: Day #2  Current Dosing Method: Bayesian-Guided AUC Dosing  Therapeutic Goal: -600 mg/L*hr  Current Dose / Plan:   Continue 1500 mg IV q24h. Regimen predicts AUC of 432 mg/L*hr with steady state trough of 12.2 mg/L  Random level is ordered for 11/23 AM to further evaluate above regimen. Will continue to monitor clinical condition and make adjustments to regimen as appropriate    Please call with questions--  Thanks--  Keyonna Rangel, PharmD, BCPS, BCGP  [id number] ([facility])   11/22/2022 11:30 AM      Interval update:  Pt is now s/p Left foot I&D with 5th MT ostectomy and delayed primary closure (done this AM). Subjective/Objective:  Afua Calixto is a 81 y.o. male with a PMHx significant for DM, HTN, and HLD who is admitted with L foot wound, concerning for potential bone/joint infection. Pharmacy is consulted to dose vancomycin. Ht Readings from Last 1 Encounters:   11/21/22 5' 9\" (1.753 m)     Wt Readings from Last 1 Encounters:   11/22/22 214 lb 1.1 oz (97.1 kg)     Current & Prior Antimicrobial Regimen(s):  Cefepime 2 g IV q8h EI (11/21-present)  Vancomycin PTD  2000 mg IV x1 dose (11/21)  1500 mg IV q24h (11/22-present)    Vancomycin Level(s) / Doses:  Date Time Dose Type of Level / Level Interpretation   11/23 06:00 1500mg IV q24h Random = ordered           Note: Serum levels collected for AUC-based dosing may be high if collected in close proximity to the dose administered. This is not necessarily indicative of toxicity.     Cultures & Sensitivities:   Date Site Micro Susceptibility / Result   11/21 MRSA nares In process    11/21 Blood x2 sent      Recent Labs     11/21/22  1130   CREATININE 1.1   BUN 22*   WBC 12.1* Estimated Creatinine Clearance: 72 mL/min (based on SCr of 1.1 mg/dL). Additional Lab Values / Findings of Note:  No results for input(s): PROCAL in the last 72 hours.

## 2022-11-22 NOTE — CARE COORDINATION
SW received call from staff RN stating spouse called asking about skilled Home Care services. Pt to be DC home today. SW called spouse but phone # went to voicemail. SW left message stating that there are no Home Care Orders for Pt and that she can call SW with any question or concerns.     Nora Stevenson, [place]  Case Management  [phone]

## 2022-11-22 NOTE — PROGRESS NOTES
Received phone clara from wife, updated that discharge order was just written, RN will check with care management to see if anything needs addressed. Wife stated desire to check on availability of home care since she is--at the moment--being picked up from an inpatient stay at a hospital. Wife stated she will wait until she hears from us to arrive to pick him up.

## 2022-11-22 NOTE — BRIEF OP NOTE
Brief Postoperative Note      Patient: Jarrell Pisano  YOB: 1950  MRN: [id number]    Date of Procedure: 11/22/2022    Pre-Op Diagnosis: LEFT DIABETIC FOOT INFECTION    Post-Op Diagnosis: Same       Procedure(s):  LEFT FOOT DEBRIDEMENT INCISION AND DRAINAGE WITH 5TH METATARSAL OSTECTOMY AND DELAYED PRIMARY    Surgeon(s):  Rosa Iglesias DPM    Assistant:  Resident: Tamika Roger DPM    Anesthesia: Choice    Hemostasis: anatomic dissection    Injectables: pre-op 20cc of 1% lidocaine plain and post-op 30cc of 0.5% marcaine plain     Materials: 2-0 vicryl, 2-0 nylon, and 3-0 nylon      Estimated Blood Loss (mL): less than 50     Complications: None    Specimens:   ID Type Source Tests Collected by Time Destination   A : A. FIFTH METATARSAL  Bone Bone SURGICAL PATHOLOGY Rosa Iglesias DPM 11/22/2022 0800        Implants:  * No implants in log *      Drains: * No LDAs found *    Findings: No purulence was noted intra-operatively. Adequate bleeding was noted indicative of healthy soft tissue. Bone was white, hard, and shiny consistent with healthy bone    DISPO: S/p left foot I&D with 5th metatarsal ostectomy and delayed primary closure. Patient will be NWB to E. Patient stable for discharge from podiatric standpoint pending medical clearance.     Electronically signed by Tamika Roger DPM on 11/22/2022 at 8:26 AM

## 2022-11-22 NOTE — PLAN OF CARE
Problem: Skin/Tissue Integrity  Goal: Absence of new skin breakdown  Description: 1. Monitor for areas of redness and/or skin breakdown  2. Assess vascular access sites hourly  3. Every 4-6 hours minimum:  Change oxygen saturation probe site  4. Every 4-6 hours:  If on nasal continuous positive airway pressure, respiratory therapy assess nares and determine need for appliance change or resting period.   Outcome: Progressing     Problem: Infection - Adult  Goal: Absence of infection at discharge  Outcome: Progressing  Flowsheets (Taken 11/21/2022 2033)  Absence of infection at discharge: Assess and monitor for signs and symptoms of infection  Goal: Absence of infection during hospitalization  Outcome: Progressing  Flowsheets (Taken 11/21/2022 2033)  Absence of infection during hospitalization: Assess and monitor for signs and symptoms of infection  Goal: Absence of fever/infection during anticipated neutropenic period  Outcome: Progressing  Flowsheets (Taken 11/21/2022 2033)  Absence of fever/infection during anticipated neutropenic period: Monitor white blood cell count     Problem: Infection - Adult  Goal: Absence of infection during hospitalization  Outcome: Progressing  Flowsheets (Taken 11/21/2022 2033)  Absence of infection during hospitalization: Assess and monitor for signs and symptoms of infection     Problem: Infection - Adult  Goal: Absence of fever/infection during anticipated neutropenic period  Outcome: Progressing  Flowsheets (Taken 11/21/2022 2033)  Absence of fever/infection during anticipated neutropenic period: Monitor white blood cell count     Problem: Chronic Conditions and Co-morbidities  Goal: Patient's chronic conditions and co-morbidity symptoms are monitored and maintained or improved  Outcome: Progressing  Flowsheets (Taken 11/21/2022 2033)  Care Plan - Patient's Chronic Conditions and Co-Morbidity Symptoms are Monitored and Maintained or Improved: Monitor and assess patient's chronic conditions and comorbid symptoms for stability, deterioration, or improvement     Problem: Discharge Planning  Goal: Discharge to home or other facility with appropriate resources  Outcome: Progressing  Flowsheets (Taken 11/21/2022 2033)  Discharge to home or other facility with appropriate resources: Identify barriers to discharge with patient and caregiver

## 2022-11-22 NOTE — PROGRESS NOTES
Pt d/c'd 11/23/22. IV access removed with no complications. Reviewed d/c instructions, home meds, and f/u information utilizing teach-back method. Scripts for oxycodone sent with patient. Patient verbalized understanding. Patient assisted to lobby in wheelchair and left with all documented belongings.

## 2022-11-22 NOTE — PROGRESS NOTES
4 Eyes Admission Assessment     I agree as the admission nurse that 2 RN's have performed a thorough Head to Toe Skin Assessment on the patient. ALL assessment sites listed below have been assessed on admission. Areas assessed by both nurses:   [x]   Head, Face, and Ears   [x]   Shoulders, Back, and Chest  [x]   Arms, Elbows, and Hands   [x]   Coccyx, Sacrum, and Ischium  [x]   Legs, Feet, and Heels      Dressing to left foot from surgical incision. Dressing CDI. No other skin issues noted   Does the Patient have Skin Breakdown?   No         Yaron Prevention initiated:  No   Wound Care Orders initiated:  No      [facility] nurse consulted for Pressure Injury (Stage 3,4, Unstageable, DTI, NWPT, and Complex wounds) or Yaron score 18 or lower:  No      Nurse 1 eSignature: Electronically signed by Colton Interiano RN on 11/22/22 at 3:27 AM EST    **SHARE this note so that the co-signing nurse is able to place an eSignature**    Nurse 2 eSignature: Electronically signed by Skye Iabrra RN on 11/22/22 at 3:48 AM EST

## 2022-11-22 NOTE — PLAN OF CARE
Problem: Skin/Tissue Integrity  Goal: Absence of new skin breakdown  Description: 1. Monitor for areas of redness and/or skin breakdown  2. Assess vascular access sites hourly  3. Every 4-6 hours minimum:  Change oxygen saturation probe site  4. Every 4-6 hours:  If on nasal continuous positive airway pressure, respiratory therapy assess nares and determine need for appliance change or resting period.   Outcome: Adequate for Discharge     Problem: Infection - Adult  Goal: Absence of infection at discharge  Outcome: Adequate for Discharge  Goal: Absence of infection during hospitalization  Outcome: Adequate for Discharge  Goal: Absence of fever/infection during anticipated neutropenic period  Outcome: Adequate for Discharge     Problem: Chronic Conditions and Co-morbidities  Goal: Patient's chronic conditions and co-morbidity symptoms are monitored and maintained or improved  Outcome: Adequate for Discharge     Problem: Discharge Planning  Goal: Discharge to home or other facility with appropriate resources  Outcome: Adequate for Discharge     Problem: Pain  Goal: Verbalizes/displays adequate comfort level or baseline comfort level  Outcome: Adequate for Discharge

## 2022-11-22 NOTE — PROGRESS NOTES
Pt arrived from OR s/p LEFT FOOT DEBRIDEMENT INCISION AND DRAINAGE WITH 5TH METATARSAL OSTECTOMY AND DELAYED PRIMARY. Report given by crna, reported hemodynamically stable intraop. Pt arrived with oral airway, but it was removed by crna shortly after pacu arrival. Pt remains on 3 L nasal cannula, is hemodynamically stable, and is resting comfortable with eyes closed.

## 2022-11-22 NOTE — PROGRESS NOTES
PACU Transfer to Floor Note    Procedure(s):  LEFT FOOT DEBRIDEMENT INCISION AND DRAINAGE WITH 5TH METATARSAL OSTECTOMY AND DELAYED PRIMARY    Current Allergies: penicillin    Pt meets criteria as per Sherry Score and ASPAN Standards to transfer to next phase of care. Recent Labs     11/21/22 2153 11/22/22  0835   POCGLU 140* 137*       Vitals:    11/22/22 0930   BP: (!) 140/71   Pulse: 82   Resp: 13   Temp: 98.5 °F (36.9 °C)   SpO2: 96%     Vitals within 20% of pt's admission vitals as per SHERRY SCORE    Pt has scheduled BP meds to take on floor. SpO2: 96 %    Room air. Intake/Output Summary (Last 24 hours) at 11/22/2022 0934  Last data filed at 11/22/2022 0930  Gross per 24 hour   Intake 445 ml   Output 2425 ml   Net -1980 ml       Pain assessment:      Pain Level: 5    Patient was assessed for alterations to skin integrity. There were not alterations observed. Is patient incontinent: no    Handoff report given to bedside RN, April, over the phone. Pt refused any family updates at this time.        11/22/2022 9:34 AM

## 2022-11-22 NOTE — DISCHARGE INSTRUCTIONS
Ericka Liu [address]   [address]  [phone]    Dr. Mitchel Mitchell Operative Instructions    1. Have Prescriptions filled and take as directed. All medications should be taken with food or milk. 2.  Keep foot elevated six inches above the level of the heart. Support feet, legs, and knees with pillows. 3.  KEEP FOOT ELEVATED AS MUCH AS POSSIBLE UNTIL YOUR NEXT VISIT    4. Place an ice pack on the bandaged site for 15 minutes every hour while awake    5. Keep dressing clean, dry, and intact. DO NOT REMOVE DRESSING. CALL THE OFFICE IF BANDAGE COMES OFF. 6.  For the first 7 days after surgery, take temperature by mouth three times a day. Call the office if greater than 101F. 7.  Non-weightbearing to left lower extremity    8. All instructions are to be followed until otherwise instructed by your surgeon. 9.  Call our office if you have any concerns or questions which arise. Our phones are answered 24 hours a day. [phone]    96. Your first post-operative appointment is scheduled, call the office for appointment date and time if not known prior to today.

## 2022-11-22 NOTE — DISCHARGE SUMMARY
Hospital Medicine Discharge Summary    Patient ID: Shell Jackson      Patient's PCP: No primary care provider on file. Admit Date: 11/21/2022     Discharge Date:   11/22/22    Admitting Provider: Dickson Benjamin MD     Discharge Provider: Dickson Benjamin MD     Discharge Diagnoses: Active Hospital Problems    Diagnosis     Type 2 diabetes mellitus with [facility] foot [facility] ([facility] [J87.057, L97.529]      Priority: Medium       The patient was seen and examined on day of discharge and this discharge summary is in conjunction with any daily progress note from day of discharge. Hospital Course:     81 y.o. male with PMH of DM, HTN, HLD who presents with left foot pain. Patient reports that he had a surgery on his left foot back in September for a bone spur. He states that his sutures were removed at a postoperative visit 10 days later. He states that the wound opened up afterwards and has progressively worsened. He reports chronic pain in the foot, worse at night, and for which he has been taking oxycodone. He denies fevers, nausea or vomiting or other systemic symptoms. He reports that his diabetes is well controlled.       -Left foot diabetic ulcer with suspected Osteomyelitis   -DM type 2    Patient started on iv vanco plus meropenem. Podiatry consulted. Patient underwent   LEFT FOOT DEBRIDEMENT INCISION AND DRAINAGE WITH 5TH METATARSAL OSTECTOMY AND DELAYED PRIMARY      Post OP patient is feeling well. Vitals stable. Cleared by Podiatry for DC. Physical Exam Performed:     /70   Pulse 88   Temp 98.6 °F (37 °C) (Oral)   Resp 16   Ht 5' 9\" (1.753 m)   Wt 214 lb 1.1 oz (97.1 kg)   SpO2 99%   BMI 31.61 kg/m²       General appearance:  No apparent distress, appears stated age and cooperative. HEENT:  Normal cephalic, atraumatic without obvious deformity. Pupils equal, round, and reactive to light. Extra ocular muscles intact. Conjunctivae/corneas clear.   Neck: Supple, with full range of motion. No jugular venous distention. Trachea midline. Respiratory:  Normal respiratory effort. Clear to auscultation, bilaterally without Rales/Wheezes/Rhonchi. Cardiovascular:  Regular rate and rhythm with normal S1/S2 without murmurs, rubs or gallops. Abdomen: Soft, non-tender, non-distended with normal bowel sounds. Musculoskeletal:  No clubbing, cyanosis or edema bilaterally. Full range of motion without deformity. Skin: Skin color, texture, turgor normal.  No rashes or lesions. Neurologic:  Neurovascularly intact without any focal sensory/motor deficits. Cranial nerves: II-XII intact, grossly non-focal.  Psychiatric:  Alert and oriented, thought content appropriate, normal insight  Capillary Refill: Brisk,< 3 seconds   Peripheral Pulses: +2 palpable, equal bilaterally       Labs: For convenience and continuity at follow-up the following most recent labs are provided:      CBC:    Lab Results   Component Value Date/Time    WBC 12.1 11/21/2022 11:30 AM    HGB 12.9 11/21/2022 11:30 AM    HCT 38.2 11/21/2022 11:30 AM     11/21/2022 11:30 AM       Renal:    Lab Results   Component Value Date/Time     11/21/2022 11:30 AM    K 4.3 11/21/2022 11:30 AM    CL 99 11/21/2022 11:30 AM    CO2 27 11/21/2022 11:30 AM    BUN 22 11/21/2022 11:30 AM    CREATININE 1.1 11/21/2022 11:30 AM    CALCIUM 9.9 11/21/2022 11:30 AM         Significant Diagnostic Studies    Radiology:   XR FOOT LEFT (MIN 3 VIEWS)   Final Result   Impression:   1. Interval resection of the proximal aspect of the fifth metatarsal with gas within the soft tissues. XR CHEST (2 VW)   Final Result      No acute pulmonary pathology      Mild cardiac enlargement            XR FOOT LEFT (MIN 3 VIEWS)   Final Result   1. Lytic area along the base of the fifth metatarsal suspicious for possible osteomyelitis. Clinical correlation recommended.              Consults:     IP CONSULT TO PODIATRY  IP CONSULT TO HOSPITALIST  PHARMACY TO DOSE VANCOMYCIN  PHARMACY TO DOSE VANCOMYCIN    Disposition:  Home     Condition at Discharge: Stable    Discharge Instructions/Follow-up:  Podiatry     Code Status:  Full Code     Activity: activity as tolerated    Diet: cardiac diet      Discharge Medications:     Current Discharge Medication List             Details   oxyCODONE-acetaminophen (PERCOCET) 7.5-325 MG per tablet Take 1 tablet by mouth every 6 hours as needed for Pain for up to 7 days. Intended supply: 30 days  Qty: 28 tablet, Refills: 0    Comments: Reduce doses taken as pain becomes manageable  Associated Diagnoses: Post-operative pain                Details   aspirin 81 MG EC tablet Take 81 mg by mouth daily      glipiZIDE (GLUCOTROL) 5 MG tablet Take 5 mg by mouth every morning (before breakfast)      clopidogrel (PLAVIX) 75 MG tablet Take 75 mg by mouth daily      losartan (COZAAR) 25 MG tablet Take 25 mg by mouth daily      metFORMIN (GLUCOPHAGE) 500 MG tablet See Admin Instructions Take 2 tablets by mouth every morning and 1 tablet by mouth every night at bedtime      rosuvastatin (CRESTOR) 20 MG tablet Take 20 mg by mouth daily             Time Spent on discharge: 22 minutes in the examination, evaluation, counseling and review of medications and discharge plan. Signed: Lucius Wright MD   11/22/2022      Thank you No primary care provider on file. for the opportunity to be involved in this patient's care. If you have any questions or concerns, please feel free to contact me at [phone].

## 2022-11-22 NOTE — OP NOTE
Operative Note      Patient: Carmen Mcduffie  YOB: 1950  MRN: [id number]    Date of Procedure: 11/22/2022    Pre-Op Diagnosis: LEFT DIABETIC FOOT INFECTION    Post-Op Diagnosis: Same       Procedure(s):  LEFT FOOT DEBRIDEMENT INCISION AND DRAINAGE WITH 5TH METATARSAL OSTECTOMY AND DELAYED PRIMARY    Surgeon(s):  Efrain Mcfarlane DPM    Assistant:   Resident: Ena Mena DPM    Anesthesia: Choice     Hemostasis: anatomic dissection     Injectables: pre-op 20cc of 1% lidocaine plain and post-op 30cc of 0.5% marcaine plain      Materials: 2-0 vicryl, 2-0 nylon, and 3-0 nylon       Estimated Blood Loss (mL): less than 50      Complications: None    Specimens:   ID Type Source Tests Collected by Time Destination   A : A. FIFTH METATARSAL  Bone Bone SURGICAL PATHOLOGY Efrain Mcfarlane DPM 11/22/2022 0800        Implants:  * No implants in log *      Drains: * No LDAs found *    Findings: No purulence was noted intra-operatively. Adequate bleeding was noted indicative of healthy soft tissue. Bone was white, hard, and shiny consistent with healthy bone    INDICATIONS FOR PROCEDURE: This patient has signs and symptoms clinically  consistent with the above mentioned preoperative diagnosis. Having failed conservative treatment, it was determined that the patient would benefit from surgical intervention. Long discussion with patient  about 5th ray amputation vs. Removal of exposed 5th metatarsal base. Patient opted to proceed with removal of 5th metatarsal base only even though we discussed the possibility of instability of the lateral column. Patient expressed understanding and wished to proceed with the above procedure. All potential risks, benefits, and complications were discussed with the patient prior to the scheduling of surgery. All the patient's questions were answered and no guarantees were given. The patient wished to proceed with surgery, and informed written consent was obtained. DETAILS OF PROCEDURE: The patient was brought from the pre-operative area and placed on the operating table in the supine position. Following IV sedation, a local anesthetic block consisting of 20cc of 1% lidocaine plain was then injected proximal to the incision site. The left  lower extremity was then scrubbed, prepped, and draped in the usual sterile fashion. A time-out was performed. The patient, procedure, and operative site were confirmed and the following procedure was performed. PROCEDURE: LEFT FOOT DEBRIDEMENT INCISION AND DRAINAGE WITH 5TH METATARSAL OSTECTOMY AND DELAYED PRIMARY CLOSURE:  Attention was directed to the patients left foot where over the lateral aspect of the midfoot, overlying the styloid process, there was a full-thickness ulceration with exposed bone. Using a #15 blade, a full thickness incision was made excising the ulceration in a 3-to-1 fashion. The incision was carried down through the subcutaneous tissues using sharp dissection. Next deep fascial and capsular and periosteum was incised exposing the base of the fifth metatarsal.    Next, using a sagittal saw with a #138 blade, the proximal 1/5th or base of fifth metatarsal (styloid process) was resected. Bone was noted to be hard and firm consistent with healthy bone. A perforating towel clamp and #15 blade was utilize to remove the fragment. The resected bone was then passed to the back table to be sent to pathology for histological and pathological examination. A bone rongeur was then utilized to remove any remaining bone shelves and smooth out the ostectomy site. Next, Using a pulse irrigation system, the surgical site was irrigated using approximately 1000mL of sterile saline pulse lavage. At no time was there any evidence of purulence or frankly infectious material. The wound was again inspected and found to be clean, granular and deemed appropriate for closure. Next, closure of the surgical site was initiated. Using 2-0 vicryl the deep fascial and subcutaneous layers were closed. Next, the surgical incision was re-approximated with 2-0 and 3-0 nylon in a simple interrupted fashion. At this time, local anesthetic consisting of 30cc of 0.5% marcaine plain was injected proximal to the incision site for patient's postoperative comfort. A soft sterile dressing consisting of polysporin, adaptic, sterile 4x8's, ryan, cast padding, and ACE was then applied. END OF PROCEDURE: The patient tolerated the procedure and anesthesia well. The patient left the OR and was transferred to the PACU with vital signs stable and vascular status intact to all aspects of the left lower extremity. Following short period of postoperative monitoring, the patient will be readmitted to medical floor for further medical management and treatment of their medical comorbid conditions.     Op note was dictated on behalf of Dr. Erin Saldivar ,Amanda Wong, DPM  Podiatric Resident PGY-1  Pager [phone] or Perfect Serve

## 2022-11-22 NOTE — ANESTHESIA POSTPROCEDURE EVALUATION
Department of Anesthesiology  Postprocedure Note    Patient: Shell Jackson  MRN: [id number]  YOB: 1950  Date of evaluation: 11/22/2022      Procedure Summary     Date: 11/22/22 Room / Location: [address]    Anesthesia Start: [address] Anesthesia Stop: 4417    Procedure: LEFT FOOT DEBRIDEMENT INCISION AND DRAINAGE WITH 5TH METATARSAL OSTECTOMY AND DELAYED PRIMARY (Left) Diagnosis:       Left foot infection      (LEFT DIABETIC FOOT INFECTION)    Surgeons: Brittaney Carballo DPM Responsible Provider: Krystian Ramos MD    Anesthesia Type: MAC, general ASA Status: 2          Anesthesia Type: No value filed.     Kranthi Phase I: Kranthi Score: 8    Kranthi Phase II:        Anesthesia Post Evaluation    Patient location during evaluation: PACU  Level of consciousness: awake  Complications: no  Multimodal analgesia pain management approach

## 2022-11-22 NOTE — ANESTHESIA PRE PROCEDURE
(LOVENOX) injection 40 mg  40 mg SubCUTAneous Daily Scott Prieto MD        ondansetron (ZOFRAN-ODT) disintegrating tablet 4 mg  4 mg Oral Q8H PRN Scott Prieto MD        Or    ondansetron (ZOFRAN) injection 4 mg  4 mg IntraVENous Q6H PRN Scott Prieto MD        polyethylene glycol (GLYCOLAX) packet 17 g  17 g Oral Daily PRN Laly Carlos MD        acetaminophen (TYLENOL) tablet 650 mg  650 mg Oral Q6H PRN Scott Prieto MD        Or    acetaminophen (TYLENOL) suppository 650 mg  650 mg Rectal Q6H PRN Scott Prieto MD        labetalol (NORMODYNE;TRANDATE) injection 10 mg  10 mg IntraVENous Q4H PRN Scott Prieto MD        insulin lispro (1 Unit Dial) (HUMALOG/ADMELOG) pen 0-4 Units  0-4 Units SubCUTAneous TID WC Scott Prieto MD        insulin lispro (1 Unit Dial) (HUMALOG/ADMELOG) pen 0-4 Units  0-4 Units SubCUTAneous Nightly Scott Prieto MD        cefepime (MAXIPIME) 2000 mg IVPB minibag  2,000 mg IntraVENous Q8H Scott Prieto MD 12.5 mL/hr at 11/22/22 0320 2,000 mg at 11/22/22 0320    vancomycin (VANCOCIN) 1,500 mg in dextrose 5 % 250 mL IVPB  1,500 mg IntraVENous Q24H Laly Carlos MD           Allergies: Allergies   Allergen Reactions    Penicillins Other (See Comments)     Unknown reaction; Patient states reaction occurred as a child but he cannot recall what reaction was. Patient tolerated cefepime as an inpatient on 11/21/22. Problem List:    Patient Active Problem List   Diagnosis Code    Type 2 diabetes mellitus with left diabetic foot ulcer ([facility] E11.621, B97.587       Past Medical History:  History reviewed. No pertinent past medical history. Past Surgical History:  History reviewed. No pertinent surgical history.     Social History:    Social History     Tobacco Use    Smoking status: Never    Smokeless tobacco: Never   Substance Use Topics    Alcohol use: Not Currently                                Counseling given: Not Answered      Vital Signs (Current):   Vitals:    11/21/22 1605 11/21/22 1800 11/21/22 2033 11/22/22 0324   BP: 131/67 134/85 127/77 124/71   Pulse: 73  80 71   Resp: 18  16 18   Temp: 98.3 °F (36.8 °C)  97.9 °F (36.6 °C) 97.6 °F (36.4 °C)   TempSrc: Oral  Oral Oral   SpO2: 100% 100% 99% 97%   Weight:    214 lb 1.1 oz (97.1 kg)   Height:                                                  BP Readings from Last 3 Encounters:   11/22/22 124/71       NPO Status:                                                                                 BMI:   Wt Readings from Last 3 Encounters:   11/22/22 214 lb 1.1 oz (97.1 kg)     Body mass index is 31.61 kg/m².     CBC:   Lab Results   Component Value Date/Time    WBC 12.1 11/21/2022 11:30 AM    RBC 4.29 11/21/2022 11:30 AM    HGB 12.9 11/21/2022 11:30 AM    HCT 38.2 11/21/2022 11:30 AM    MCV 88.9 11/21/2022 11:30 AM    RDW 13.7 11/21/2022 11:30 AM     11/21/2022 11:30 AM       CMP:   Lab Results   Component Value Date/Time     11/21/2022 11:30 AM    K 4.3 11/21/2022 11:30 AM    CL 99 11/21/2022 11:30 AM    CO2 27 11/21/2022 11:30 AM    BUN 22 11/21/2022 11:30 AM    CREATININE 1.1 11/21/2022 11:30 AM    LABGLOM >60 11/21/2022 11:30 AM    GLUCOSE 154 11/21/2022 02:30 PM    CALCIUM 9.9 11/21/2022 11:30 AM       POC Tests:   Recent Labs     11/21/22 2153   POCGLU 140*       Coags:   Lab Results   Component Value Date/Time    PROTIME 14.7 11/21/2022 11:30 AM    INR 1.16 11/21/2022 11:30 AM       HCG (If Applicable): No results found for: PREGTESTUR, PREGSERUM, HCG, HCGQUANT     ABGs: No results found for: PHART, PO2ART, BSM1PXK, SFW4RRD, BEART, H7SDVQJL     Type & Screen (If Applicable):  No results found for: LABABO, LABRH    Drug/Infectious Status (If Applicable):  No results found for: HIV, HEPCAB    COVID-19 Screening (If Applicable): No results found for: COVID19        Anesthesia Evaluation    Airway: Mallampati: II  TM distance: >3 FB   Neck ROM: full  Mouth opening: > = 3 FB   Dental:          Pulmonary: Cardiovascular:            Rhythm: regular  Rate: normal                    Neuro/Psych:               GI/Hepatic/Renal:             Endo/Other:    (+) Diabetes, . Abdominal:             Vascular: Other Findings:           Anesthesia Plan      MAC and general     ASA 2       Induction: intravenous. Anesthetic plan and risks discussed with patient. Plan discussed with CRNA.                     Rosalina Caceres MD   11/22/2022

## 2022-11-22 NOTE — PROGRESS NOTES
Dr David Stover at bedside. Wants extremity elevated, ice applied, and a surgical shoe. Orders placed. Script put in chart.

## 2022-11-22 NOTE — PROGRESS NOTES
Physician Progress Note      PATIENT:               Simon Solis  CSN #:                  [id number]  :                       1950  ADMIT DATE:       2022 10:10 AM  DISCH DATE:  RESPONDING  PROVIDER #:        Kasia Cash MD          QUERY TEXT:    Pt admitted with infected left foot ulcer and is s/p bone spur removal surgery   in 2022. ? If possible, please document in progress notes and   discharge summary the relationship if any between the left foot ulcer and the   surgery: The medical record reflects the following:  Risk Factors: 81year old male w/ Type 2 DM, s/p bone spur surgery 2022  Clinical Indicators: Per  H&P- Patient reports that he had a surgery on   his left foot back in September for a bone spur. He states that his sutures   were removed at a postoperative visit 10 days later. He states that the wound   opened up afterwards and has progressively worsened. .. Left foot diabetic   ulcer with suspected Osteomyelitis. Per  Podiatry Consult note- Chronic   nonhealing surgical wound left lateral lower extremity. Concern for   postoperative infection. Treatment: Ostectomy, imaging, labs, IV Maxipime and Vancomycin. Options provided:  -- Infected left foot ulcer?is due to the procedure  -- Infected left foot ulcer is not due to the procedure, but is due to Type 2   Diabetes  -- Infected left foot ulcer is not due to the procedure, but is due to other   incidental risk factor, Please specify other incidental risk factor. -- Other - I will add my own diagnosis  -- Disagree - Not applicable / Not valid  -- Disagree - Clinically unable to determine / Unknown  -- Refer to Clinical Documentation Reviewer    PROVIDER RESPONSE TEXT:    Infected left foot ulcer is not due to the procedure, but due to Type 2   Diabetes. Query created by: Miracle Buchanan on 2022 2:09 PM      Electronically signed by:   Kasia Cash MD 2022 2:28 PM

## 2022-11-25 LAB — BLOOD CULTURE, ROUTINE: NORMAL

## 2022-11-26 LAB — CULTURE, BLOOD 2: NORMAL

## 2022-12-29 ENCOUNTER — TELEPHONE (OUTPATIENT)
Dept: CARDIOLOGY CLINIC | Age: 72
End: 2022-12-29

## 2022-12-29 ENCOUNTER — OFFICE VISIT (OUTPATIENT)
Dept: CARDIOLOGY CLINIC | Age: 72
End: 2022-12-29
Payer: MEDICARE

## 2022-12-29 VITALS
WEIGHT: 211 LBS | HEART RATE: 94 BPM | OXYGEN SATURATION: 97 % | DIASTOLIC BLOOD PRESSURE: 80 MMHG | HEIGHT: 69 IN | SYSTOLIC BLOOD PRESSURE: 126 MMHG | BODY MASS INDEX: 31.25 KG/M2

## 2022-12-29 DIAGNOSIS — I70.222 CRITICAL LIMB ISCHEMIA OF LEFT LOWER EXTREMITY (HCC): ICD-10-CM

## 2022-12-29 DIAGNOSIS — I73.9 PAD (PERIPHERAL ARTERY DISEASE) (HCC): Primary | ICD-10-CM

## 2022-12-29 DIAGNOSIS — I25.10 CAD IN NATIVE ARTERY: ICD-10-CM

## 2022-12-29 PROCEDURE — G8417 CALC BMI ABV UP PARAM F/U: HCPCS | Performed by: INTERNAL MEDICINE

## 2022-12-29 PROCEDURE — G8427 DOCREV CUR MEDS BY ELIG CLIN: HCPCS | Performed by: INTERNAL MEDICINE

## 2022-12-29 PROCEDURE — 1036F TOBACCO NON-USER: CPT | Performed by: INTERNAL MEDICINE

## 2022-12-29 PROCEDURE — G8484 FLU IMMUNIZE NO ADMIN: HCPCS | Performed by: INTERNAL MEDICINE

## 2022-12-29 PROCEDURE — 3017F COLORECTAL CA SCREEN DOC REV: CPT | Performed by: INTERNAL MEDICINE

## 2022-12-29 PROCEDURE — 99204 OFFICE O/P NEW MOD 45 MIN: CPT | Performed by: INTERNAL MEDICINE

## 2022-12-29 PROCEDURE — 1123F ACP DISCUSS/DSCN MKR DOCD: CPT | Performed by: INTERNAL MEDICINE

## 2022-12-29 PROCEDURE — 93000 ELECTROCARDIOGRAM COMPLETE: CPT | Performed by: INTERNAL MEDICINE

## 2022-12-29 NOTE — TELEPHONE ENCOUNTER
Bhaskar's doppler is scheduled for tomorrow, Friday 12/30/22 at [facility] arriving at 12:30 pm.     I called and went over date/time/ locations and instructions, he v/u. I tried scheduling him for a follow up with Dr. Bucky Gilliland in [facility] on 1/10/23.  He states he didn't want to wait that long and per Thais RN asked that he could be here today, 12/29/22 by 4:15 pm, he v/u

## 2022-12-29 NOTE — PROGRESS NOTES
Interventional Cardiology Consultation     Sherrin Rinne  1950    PCP: No primary care provider on file. Referring Physician: Dr. Lea Rudolph  Reason for Referral: Non healing wound  Chief Complaint: \"I had a bone spur\"       Subjective:   History of Present Illness: The patient is 81 y.o. male with a past medical history significant for DM, hypertension and hyperlipidemia. Patient had surgery on his left foot in September for a bone spur and postoperatively, wound opened afterwards. Since then he has c/o foot pain. He developed infection and is s/p left foot debridement incision and drainage by Dr. Garret Ball on 11/22/22. Today, patient comes for evaluation of non healing wound on his left foot. He brings pictures on his cell phone for view. States \"this whole thing started in September and everything got worse when the stitches came out. \" He is diabetic and states \"I check my blood sugar every day. States \"I was told I had a heart attack 2 years ago. \" States he had an angiogram and told \"my heart created it's own bypass. \" Surgery cancelled and started on Plavix. No past medical history on file. Past Surgical History:   Procedure Laterality Date    FOOT DEBRIDEMENT Left 11/22/2022    LEFT FOOT DEBRIDEMENT INCISION AND DRAINAGE WITH 5TH METATARSAL OSTECTOMY AND DELAYED PRIMARY performed by Lea Rudolph DPM at [facility] OR     No family history on file. Social History     Tobacco Use    Smoking status: Never    Smokeless tobacco: Never   Substance Use Topics    Alcohol use: Not Currently       Allergies   Allergen Reactions    Penicillins Other (See Comments)     Unknown reaction; Patient states reaction occurred as a child but he cannot recall what reaction was. Patient tolerated cefepime as an inpatient on 11/21/22.         Current Outpatient Medications   Medication Sig Dispense Refill    aspirin 81 MG EC tablet Take 81 mg by mouth daily      glipiZIDE (GLUCOTROL) 5 MG tablet Take 5 mg by mouth every morning (before breakfast)      clopidogrel (PLAVIX) 75 MG tablet Take 75 mg by mouth daily      losartan (COZAAR) 25 MG tablet Take 25 mg by mouth daily      metFORMIN (GLUCOPHAGE) 500 MG tablet See Admin Instructions Take 2 tablets by mouth every morning and 1 tablet by mouth every night at bedtime      rosuvastatin (CRESTOR) 20 MG tablet Take 20 mg by mouth daily       No current facility-administered medications for this visit. Review of Systems:  Constitutional: No unanticipated weight loss. There's been no change in energy level, sleep pattern, or activity level. No fevers, chills. Eyes: No visual changes or diplopia. No scleral icterus. ENT: No Headaches, hearing loss or vertigo. No mouth sores or sore throat. Cardiovascular: as reviewed in HPI  Respiratory: No cough or wheezing, no sputum production. No hemoptysis. Gastrointestinal: No abdominal pain, appetite loss, blood in stools. No change in bowel or bladder habits. Genitourinary: No dysuria, trouble voiding, or hematuria. Musculoskeletal:  No gait disturbance, no joint complaints. Integumentary: No rash or pruritis. Neurological: No headache, diplopia, change in muscle strength, numbness or tingling. Psychiatric: No anxiety or depression. Endocrine: No temperature intolerance. No excessive thirst, fluid intake, or urination. No tremor. Hematologic/Lymphatic: No abnormal bruising or bleeding, blood clots or swollen lymph nodes. Allergic/Immunologic: No nasal congestion or hives. Physical Exam:   /80 (Site: Left Upper Arm, Position: Sitting)   Pulse 94 Comment: reading from EKG  Ht 5' 9\" (1.753 m)   Wt 211 lb (95.7 kg)   SpO2 97%   BMI 31.16 kg/m²   Wt Readings from Last 3 Encounters:   12/29/22 211 lb (95.7 kg)   11/22/22 214 lb 1.1 oz (97.1 kg)     Constitutional: He is oriented to person, place, and time. He appears well-developed and well-nourished.  In no acute distress. Head: Normocephalic and atraumatic. Pupils equal and round. Neck: Neck supple. No JVP or carotid bruit appreciated. No mass and no thyromegaly present. No lymphadenopathy present. Cardiovascular: Normal rate. Normal heart sounds. Exam reveals no gallop and no friction rub. No murmur heard. Pulmonary/Chest: Effort normal and breath sounds normal. No respiratory distress. He has no wheezes, rhonchi or rales. Abdominal: Soft, non-tender. Bowel sounds are normal. He exhibits no organomegaly, mass or bruit. Extremities: No edema. No cyanosis or clubbing. Pulses are 2+ radial/carotid bilaterally. Neurological: No gross cranial nerve deficit. Coordination normal.   Skin: Skin is warm and dry. There is no rash or diaphoresis. Psychiatric: He has a normal mood and affect. His speech is normal and behavior is normal.     Lab Review:   FLP:  No results found for: TRIG, HDL, LDLCALC, LDLDIRECT, LABVLDL  BUN/Creatinine:    Lab Results   Component Value Date/Time    BUN 22 11/21/2022 11:30 AM    CREATININE 1.1 11/21/2022 11:30 AM     PT/INR, TNI, HGB A1C: No results found for: PTINR, TROPONINI, LABA1C   No results found for: CBCAUTODIF        XR L foot 11/22/22     1. Interval resection of the proximal aspect of the fifth metatarsal with gas within the soft tissues. L arterial doppler 12/30/22      All above diagnostic testing and laboratory data was independently visualized and reviewed by me (not simply review of report)       Assessment and Plan   1) Non healing left foot wound  -Lateral   -S/p I&D with Dr. Rylee Jones 11/2022  -Arranged for STAT left lower extremity arterial doppler on 12/30/22  -Discussed proceeding with peripheral angiogram     2) Coronary artery disease  -Likely  on angiogram with no stent placement and started on antiplatelet therapy.    -Denies any angina  -Continue Plavix     Will call with results of doppler     Thank you very much for allowing me to participate in the care of your patient. Please do not hesitate to contact me if you have any questions. Alyce Phillips MD [address] [facility], Interventional Cardiology, and Peripheral Vascular Disease   [address]   Ph: [phone]  Fax: [phone]      This note was scribed in the presence of Dr. Alyce Phillips MD by Boo Viera, RN    Physician Attestation:  The scribes documentation has been prepared under my direction and personally reviewed by me in its entirety. I confirm the note above accurately reflects all work, treatment, procedures, and medical decision making performed by me.     Electronically signed by Candida Mckeon MD on 1/2/2023 at 2:45 PM

## 2022-12-29 NOTE — TELEPHONE ENCOUNTER
Received call from Dr. Selwyn Lanes that patient has been referred from Dr. Lucretia Damon for lateral, left foot non healing wound for 10 weeks. Per Dr. Selwyn Lanes, patient will need STAT left lower extremity arterial doppler at [facility]. with a f/u appointment at the [facility] office.

## 2022-12-30 ENCOUNTER — HOSPITAL ENCOUNTER (OUTPATIENT)
Dept: VASCULAR LAB | Age: 72
Discharge: HOME | End: 2022-12-30
Payer: MEDICARE

## 2022-12-30 ENCOUNTER — TELEPHONE (OUTPATIENT)
Dept: CARDIOLOGY CLINIC | Age: 72
End: 2022-12-30

## 2022-12-30 DIAGNOSIS — I70.222 CRITICAL LIMB ISCHEMIA OF LEFT LOWER EXTREMITY (HCC): ICD-10-CM

## 2022-12-30 DIAGNOSIS — I73.9 PAD (PERIPHERAL ARTERY DISEASE) (HCC): Primary | ICD-10-CM

## 2022-12-30 DIAGNOSIS — Z01.818 PREOP TESTING: ICD-10-CM

## 2022-12-30 DIAGNOSIS — I73.9 PAD (PERIPHERAL ARTERY DISEASE) (HCC): ICD-10-CM

## 2022-12-30 PROCEDURE — 93926 LOWER EXTREMITY STUDY: CPT

## 2022-12-30 NOTE — TELEPHONE ENCOUNTER
Discussed with Dr. Mai Fails and patient will need to be scheduled for peripheral angiogram next week. Left message notifying patient that he will receive a call from [facility] to schedule procedure next week at [address] [facility]. The morning of your procedure you will park in the hospital parking lot and report directly to the cath lab to check in.     Pre-Procedure Instructions   You will need to fast for at least 8 hours prior to procedure. No caffeine, gum or mints the morning of procedure. Hold all diabetic medications including, METFORMIN, GLIPIZIDE. If you take Lantus/Levemir only take ½ your normal dose the evening before. All other medications can be taken in the morning with sips of water. You will need to take 325 mg aspirin the morning of. If you are currently taking 81 mg please take 4 tablets that morning. Do not use any lotions, creams or perfume the morning of procedure. Pre-procedure lab work will need to be completed 5-7 days prior to procedure. Please have a responsible adult to drive you home after procedure. We advise you have someone stay with you for the first 24 hours for precautionary measures. Depending on your procedure you may require an overnight stay. Cath lab will provide you with all post procedure instructions. If you have any questions regarding the procedure itself or medications, please call [phone] and ask to speak with a nurse.

## 2022-12-30 NOTE — TELEPHONE ENCOUNTER
Spoke with the pt and I will call him on Tuesday morning. He needs to check on a few things and get some paperwork completed before scheduling.

## 2023-01-02 NOTE — RESULT ENCOUNTER NOTE
Please notify patient that their doppler is abnormal. Please arrange for peripheral angiogram; either [facility] or [facility]

## 2023-01-03 NOTE — TELEPHONE ENCOUNTER
Hanny Lambert called back wanting to speak with Jolly. He said that he has a couple appointments scheduled for tomorrow (1/4/23) and wanted to leave his availability for her: 1st appt. 10:45 a.m. and 2nd appt. 2:20 p.m. Hanny Lambert states that he will be available after 11:115-11:30 a.m. and can be reached at: [phone].

## 2023-01-04 NOTE — TELEPHONE ENCOUNTER
Spoke with Neha Mar & the pt to schedule the procedure. They have a bunch of things going this week and tomorrow is the only date available. They requested a later time due to travelling from [place]. We went over instructions below and they verbalized understanding instructions. Procedure   Date: 1/5/2023  Arrival time: 12:30 9m   Procedure time: 2:00 pm     The morning of your procedure you will park in the hospital parking lot and report directly to the cath lab to check in.      Pre-Procedure Instructions   You will need to fast for at least 8 hours prior to procedure. No caffeine, gum or mints the morning of procedure. Hold all diabetic medications including, METFORMIN, GLIPIZIDE. If you take Lantus/Levemir only take ½ your normal dose the evening before. All other medications can be taken in the morning with sips of water. You will need to take 325 mg aspirin the morning of. If you are currently taking 81 mg please take 4 tablets that morning. Do not use any lotions, creams or perfume the morning of procedure. Pre-procedure lab work will need to be completed 5-7 days prior to procedure. Please have a responsible adult to drive you home after procedure. We advise you have someone stay with you for the first 24 hours for precautionary measures. Depending on your procedure you may require an overnight stay. Cath lab will provide you with all post procedure instructions. If you have any questions regarding the procedure itself or medications, please call [phone] and ask to speak with a nurse.      Teams updated / emailed cath lab

## 2023-01-05 ENCOUNTER — HOSPITAL ENCOUNTER (OUTPATIENT)
Dept: CARDIAC CATH/INVASIVE PROCEDURES | Age: 73
Discharge: HOME | End: 2023-01-05
Payer: MEDICARE

## 2023-01-05 VITALS
TEMPERATURE: 97.5 F | DIASTOLIC BLOOD PRESSURE: 80 MMHG | BODY MASS INDEX: 30.81 KG/M2 | SYSTOLIC BLOOD PRESSURE: 142 MMHG | HEART RATE: 74 BPM | OXYGEN SATURATION: 99 % | WEIGHT: 208 LBS | RESPIRATION RATE: 20 BRPM | HEIGHT: 69 IN

## 2023-01-05 LAB
ANION GAP SERPL CALCULATED.3IONS-SCNC: 15 MMOL/L (ref 3–16)
BUN BLDV-MCNC: 26 MG/DL (ref 7–20)
CALCIUM SERPL-MCNC: 9.5 MG/DL (ref 8.3–10.6)
CHLORIDE BLD-SCNC: 102 MMOL/L (ref 99–110)
CO2: 22 MMOL/L (ref 21–32)
CREAT SERPL-MCNC: 1.3 MG/DL (ref 0.8–1.3)
GFR SERPL CREATININE-BSD FRML MDRD: 59 ML/MIN/{1.73_M2}
GLUCOSE BLD-MCNC: 172 MG/DL (ref 70–99)
HCT VFR BLD CALC: 39.1 % (ref 40.5–52.5)
HEMOGLOBIN: 12.5 G/DL (ref 13.5–17.5)
MCH RBC QN AUTO: 29.2 PG (ref 26–34)
MCHC RBC AUTO-ENTMCNC: 31.9 G/DL (ref 31–36)
MCV RBC AUTO: 91.4 FL (ref 80–100)
PDW BLD-RTO: 15.1 % (ref 12.4–15.4)
PLATELET # BLD: 230 K/UL (ref 135–450)
PMV BLD AUTO: 7.8 FL (ref 5–10.5)
POC ACT LR: 237 SEC
POTASSIUM SERPL-SCNC: 4.7 MMOL/L (ref 3.5–5.1)
RBC # BLD: 4.28 M/UL (ref 4.2–5.9)
SODIUM BLD-SCNC: 139 MMOL/L (ref 136–145)
WBC # BLD: 6 K/UL (ref 4–11)

## 2023-01-05 PROCEDURE — 99152 MOD SED SAME PHYS/QHP 5/>YRS: CPT

## 2023-01-05 PROCEDURE — 2580000003 HC RX 258

## 2023-01-05 PROCEDURE — 75625 CONTRAST EXAM ABDOMINL AORTA: CPT

## 2023-01-05 PROCEDURE — C1894 INTRO/SHEATH, NON-LASER: HCPCS

## 2023-01-05 PROCEDURE — 6360000004 HC RX CONTRAST MEDICATION: Performed by: INTERNAL MEDICINE

## 2023-01-05 PROCEDURE — 37229 HC TIB PER TERRITORY ATHER: CPT

## 2023-01-05 PROCEDURE — C9766 REVASC INTRA LITHOTRIP-ATHER: HCPCS

## 2023-01-05 PROCEDURE — 75774 ARTERY X-RAY EACH VESSEL: CPT

## 2023-01-05 PROCEDURE — 6360000002 HC RX W HCPCS

## 2023-01-05 PROCEDURE — 2500000003 HC RX 250 WO HCPCS

## 2023-01-05 PROCEDURE — C1725 CATH, TRANSLUMIN NON-LASER: HCPCS

## 2023-01-05 PROCEDURE — C1724 CATH, TRANS ATHEREC,ROTATION: HCPCS

## 2023-01-05 PROCEDURE — C2623 CATH, TRANSLUMIN, DRUG-COAT: HCPCS

## 2023-01-05 PROCEDURE — 85347 COAGULATION TIME ACTIVATED: CPT

## 2023-01-05 PROCEDURE — 85027 COMPLETE CBC AUTOMATED: CPT

## 2023-01-05 PROCEDURE — 99153 MOD SED SAME PHYS/QHP EA: CPT

## 2023-01-05 PROCEDURE — 2709999900 HC NON-CHARGEABLE SUPPLY

## 2023-01-05 PROCEDURE — C1769 GUIDE WIRE: HCPCS

## 2023-01-05 PROCEDURE — C1887 CATHETER, GUIDING: HCPCS

## 2023-01-05 PROCEDURE — C1760 CLOSURE DEV, VASC: HCPCS

## 2023-01-05 PROCEDURE — 75716 ARTERY X-RAYS ARMS/LEGS: CPT

## 2023-01-05 PROCEDURE — 80048 BASIC METABOLIC PNL TOTAL CA: CPT

## 2023-01-05 RX ORDER — OXYCODONE AND ACETAMINOPHEN 7.5; 325 MG/1; MG/1
1 TABLET ORAL EVERY 6 HOURS PRN
COMMUNITY
Start: 2023-01-04

## 2023-01-05 RX ORDER — ASPIRIN 325 MG
325 TABLET ORAL ONCE
Status: DISCONTINUED | OUTPATIENT
Start: 2023-01-05 | End: 2023-01-06 | Stop reason: HOSPADM

## 2023-01-05 RX ORDER — SODIUM CHLORIDE 0.9 % (FLUSH) 0.9 %
5-40 SYRINGE (ML) INJECTION PRN
Status: DISCONTINUED | OUTPATIENT
Start: 2023-01-05 | End: 2023-01-05 | Stop reason: SDUPTHER

## 2023-01-05 RX ORDER — SODIUM CHLORIDE 0.9 % (FLUSH) 0.9 %
5-40 SYRINGE (ML) INJECTION EVERY 12 HOURS SCHEDULED
Status: DISCONTINUED | OUTPATIENT
Start: 2023-01-05 | End: 2023-01-05 | Stop reason: SDUPTHER

## 2023-01-05 RX ORDER — ACETAMINOPHEN 325 MG/1
650 TABLET ORAL EVERY 4 HOURS PRN
Status: DISCONTINUED | OUTPATIENT
Start: 2023-01-05 | End: 2023-01-06 | Stop reason: HOSPADM

## 2023-01-05 RX ORDER — SODIUM CHLORIDE 0.9 % (FLUSH) 0.9 %
5-40 SYRINGE (ML) INJECTION PRN
Status: DISCONTINUED | OUTPATIENT
Start: 2023-01-05 | End: 2023-01-06 | Stop reason: HOSPADM

## 2023-01-05 RX ORDER — SODIUM CHLORIDE 9 MG/ML
INJECTION, SOLUTION INTRAVENOUS PRN
Status: DISCONTINUED | OUTPATIENT
Start: 2023-01-05 | End: 2023-01-05 | Stop reason: SDUPTHER

## 2023-01-05 RX ORDER — RIVAROXABAN 2.5 MG/1
2.5 TABLET, FILM COATED ORAL 2 TIMES DAILY
Qty: 60 TABLET | Refills: 3 | Status: SHIPPED | OUTPATIENT
Start: 2023-01-06

## 2023-01-05 RX ORDER — PROMETHAZINE HYDROCHLORIDE 25 MG/1
25 TABLET ORAL EVERY 6 HOURS PRN
COMMUNITY
Start: 2023-01-04

## 2023-01-05 RX ORDER — SODIUM CHLORIDE 0.9 % (FLUSH) 0.9 %
5-40 SYRINGE (ML) INJECTION EVERY 12 HOURS SCHEDULED
Status: DISCONTINUED | OUTPATIENT
Start: 2023-01-05 | End: 2023-01-06 | Stop reason: HOSPADM

## 2023-01-05 RX ORDER — SODIUM CHLORIDE 9 MG/ML
INJECTION, SOLUTION INTRAVENOUS PRN
Status: DISCONTINUED | OUTPATIENT
Start: 2023-01-05 | End: 2023-01-06 | Stop reason: HOSPADM

## 2023-01-05 RX ADMIN — IOPAMIDOL 150 ML: 755 INJECTION, SOLUTION INTRAVENOUS at 16:23

## 2023-01-05 NOTE — DISCHARGE INSTRUCTIONS
PERIPHERAL ANGIOGRAM    Care of your puncture site:  Remove bandage 24 hours after the procedure. May shower in 24 hours but do not sit in a bathtub/pool of water for 5 days or until the wound is healed. Gently clean groin using soap and water. Dry thoroughly and apply a Band-Aid that covers the entire site. Use Band-Aid until skin heals over in about 3-5 days. Do not apply powder or lotion. Normal Observations:  Soreness or tenderness which may last one week. Mild oozing from the incision site. Possible bruising that could last 2 weeks. Activity:  You may resume driving 24 hours following the procedure. Do not make important / legal decisions within 24 hours after procedure. You may resume normal activity in 5 days or after the wound heals. Avoid lifting more than 10 pounds for 5 days or until the wound heals. Avoid strenuous exercise or activity for 1 week. Nutrition:  Regular diet. Drink at least 8 to 10 glasses of decaffeinated, non-alcoholic fluid for the next 24 hours to flush the x-ray dye used for your angiogram out of your body. Call your doctor immediately if your condition worsens, for any other concerns, for a follow-up appointment or if you experience any of the following:  Increased swelling on the groin or leg. Unusual pain, numbness, or tingling of the groin or down the leg. Any signs of infection such as: redness, yellow drainage at the site, swelling or pain. IF GROIN STARTS BLEEDING SIGNIFICANTLY:   LAY FLAT, HOLD FIRM DIRECT PRESSURE, AND CALL 911    Other Instructions:  Hold Metformin or Metformin containing drugs for 48 hours after procedure. Resume 1/7/23 with evening dose.

## 2023-01-05 NOTE — H&P
H&P Update - see note from 22    I have reviewed the history and physical and examined the patient and find no relevant changes. I have reviewed with the patient and/or family the risks, benefits, and alternatives to the procedure. Pre-sedation Assessment    Patient:  Lisa Stage   :   1950  Intended Procedure: peripheral angiogram     Vitals:    23 1325   BP: (!) 142/80   Pulse: 74   Resp: 20   Temp: 97.5 °F (36.4 °C)   SpO2: 99%       Nursing notes reviewed and agreed. Medications reviewed  Allergies: Allergies   Allergen Reactions    Penicillins Other (See Comments)     Unknown reaction; Patient states reaction occurred as a child but he cannot recall what reaction was. Patient tolerated cefepime as an inpatient on 22.      Ace Inhibitors      cough    Pcn [Penicillins]          Pre-Procedure Assessment/Plan:  ASA 3 - Patient with moderate systemic disease with functional limitations    Mallampati Airway Assessment:  Mallampati Class I - (soft palate, fauces, uvula & anterior/posterior tonsillar pillars are visible)    Level of Sedation Plan:Mild sedation    Post Procedure plan: Return to same level of care    Rosemary Benítez MD [address] [facility], Interventional Cardiology, and Peripheral Vascular [address] [facility]   (C): [phone]  (O): [phone]

## 2023-01-05 NOTE — OP NOTE
[facility]     Pre-operative Diagnosis:   1. Peripheral arterial disease with critical limb ischemia; misa category 4    Post-operative Diagnosis:   1. Same     Procedures Performed:  - Right femoral artery access under fluroscopic and ultrasound guidance  - Right iliofemoral arteriogram.  - Aortoiliac arteriogram.  - Bilateral lower extremity arteriogram  -Orbital atherectomy of the left popliteal and posterior tibial artery using a 1.25 mm CSI bur  -Shockwave lithotripsy of the left superficial femoral artery using a 6.0 x 60 mm balloon  - Percutaneous balloon angioplasty of left superficial femoral artery using a 6.0 mm x 100 mm drug-coated balloon   - Percutaneous balloon angioplasty of left popliteal artery using a 4.0 mm x 100 mm drug-coated balloon   - Completion arteriogram.      :  Junaid Fenton MD.    Assistant:  None . Anesthesia:  IV sedation and local anesthesia. Moderate Sedation:  Start time: 5  Stop time: 150  1457 mg versed   1620 mcg fentanyl   An independent trained observer pushed medications at my direction. We monitored the patient's level of consciousness and vital signs/physiologic status throughout the procedure duration (see start and start times above). Estimated Blood Loss:  10 mL. Indications for Procedure:  Shaun Wilson is a 81 y.o. male who was evaluated as an outpatient with chronic arterial insufficiency with ulceration of the left foot  and was deemed an appropriate candidate for an angiogram and possible intervention. Informed consent was obtained after discussion of potential benefits, alternatives and risks of the procedure, including but not limited to bleeding, infection, worsening of arterial insufficiency, and kidney injury due to contrast administration. Details of Procedure: The patient was taken to the cardiac cath lab and placed in supine position.   IV sedation anesthesia was administered by the anesthesia team. The operative area was clipped, prepared and draped in sterile fashion. A brief time out was performed per hospital protocol. The right femoral artery was accessed under fluroscopic and ultrasound guidance with a micropuncture needle, wire, then sheath. A 4-Senegalese sheath was inserted over a wire. An iliofemoral angiogram was performed. Using a pig tail catheter positioned at the level of the renal arteries, a aortoiliac arteriogram was performed. Renal arteries: patent  Abdominal Aorta:  none calcification. Right Common Iliac:  0%  Stenosis  Right Internal Iliac:  is  patent  Right External Iliac:  is  patent  Left Common Iliac:  0% stenosis  Left Internal Iliac:  is  patent  Left External Iliac:  is  patent    The catheter was then pulled back to the level of the bifurcation and a bilateral lower extremity runoff was performed:    Right Leg  Common Femoral:   without disease   Profunda: patent without significant disease  Superficial femoral: mild disease. Popliteal: mild disease. Anterior Tibial: is  patent with mild disease  Posterior Tibial:  is  patent with mild disease  Peroneal: is  patent with mild disease      Left Leg  Common Femoral:   without disease   Profunda: patent without significant disease  Superficial femoral: severe disease mid 99% with severe calcification    Popliteal: severe disease, ~ 80% stenosis     Anterior Tibial: is  patent with mild disease  Posterior Tibial:  is  patent with mild disease  Peroneal: is  patent with mild disease    A 6 Senegalese sheath was inserted over the wire into the femoral artery and positioned up-and-over into the contralateral common femoral artery   and after 5000 units of intravenous heparin was administered and three minutes allowed to elapse. The left posterior tibial artery was recanalized using a 0.014 wire a, 0.014 micro catheter. The 014 wire was then exchanged for a Viper wire.   CSI orbital atherectomy was then performed of the left popliteal artery and left posterior tibial artery. Balloon angioplasty was then performed the left tibial artery using a 4.0 x 100 mm drug-coated balloon    Shockwave lithotripsy was then performed the left superficial femoral artery using a 6.0 x 60 mm balloon    The left SFA angioplasty was then performed using a 6.0 x 100 mm drug-coated balloon    A completion arteriogram was performed. The sheath was withdrawn over a wire then exchanged for a short sheath. A Mynx closure device was used to close the arteriotomy. The patient tolerated the procedure well, was awakened and was taken to the post-operative care unit in stable condition.      Impression/Plan:   99% left mid SFA, 90% left popliteal artery stenosis  Status post successful atherectomy and balloon angioplasty of the left superficial femoral artery, left popliteal artery, and  left posterior tibial artery  Aggressive medical therapy for peripheral arterial disease, aspirin, Xarelto 2.5 twice daily, high intensity statin  Repeat Doppler in 3 months 6 months and 1 year  Outpatient follow-up in 4 to 6 weeks    Hazel Johnson MD [address] [facility], Interventional Cardiology, and Peripheral Vascular [address] [facility]   (C): [phone]  (O): [phone]

## 2023-01-06 ENCOUNTER — TELEPHONE (OUTPATIENT)
Dept: CARDIOLOGY CLINIC | Age: 73
End: 2023-01-06

## 2023-01-06 NOTE — TELEPHONE ENCOUNTER
Caden Ratliff called in stating that he can't afford TO PAY $395.00 FOR Emily Sellers and is there anything else he can do. He also stated if he should stay on PLAVIX?         Caden Ratliff can be reached at [phone]

## 2023-01-10 RX ORDER — SULFAMETHOXAZOLE AND TRIMETHOPRIM 800; 160 MG/1; MG/1
TABLET ORAL
COMMUNITY
Start: 2022-12-24 | End: 2023-05-08

## 2023-01-10 RX ORDER — FLASH GLUCOSE SENSOR
KIT MISCELLANEOUS
COMMUNITY
Start: 2022-12-20

## 2023-01-10 RX ORDER — CLINDAMYCIN HYDROCHLORIDE 300 MG/1
CAPSULE ORAL
COMMUNITY
Start: 2022-10-05

## 2023-01-10 NOTE — PROGRESS NOTES
Place patient label inside box (if no patient label, complete below)  Name:  :  MR#:     Roverto Leos / PROCEDURE  I (we), Fernie Gonzalez (Patient Name) authorize DR. Marleny Nelson (Provider / Lorenza Kawasaki) and/or such assistants as may be selected by him/her, to perform the following operation/procedure(s): SURGICAL PREPARATION OF WOUND BED LEFT FOOT, APPLICATION OF DERMAL GRAFT LEFT FOOT       Note: If unable to obtain consent prior to an emergent procedure, document the emergent reason in the medical record. This procedure has been explained to my (our) satisfaction and included in the explanation was: The intended benefit, nature, and extent of the procedure to be performed; The significant risks involved and the probability of success; Alternative procedures and methods of treatment; The dangers and probable consequences of such alternatives (including no procedure or treatment); The expected consequences of the procedure on my future health; Whether other qualified individuals would be performing important surgical tasks and/or whether  would be present to advise or support the procedure. I (we) understand that there are other risks of infection and other serious complications in the pre-operative/procedural and postoperative/procedural stages of my (our) care. I (we) have asked all of the questions which I (we) thought were important in deciding whether or not to undergo treatment or diagnosis. These questions have been answered to my (our) satisfaction. I (we) understand that no assurance can be given that the procedure will be a success, and no guarantee or warranty of success has been given to me (us).     It has been explained to me (us) that during the course of the operation/procedure, unforeseen conditions may be revealed that necessitate extension of the original procedure(s) or different procedure(s) than those set forth in Paragraph 1. I (we) authorize and request that the above-named physician, his/her assistants or his/her designees, perform procedures as necessary and desirable if deemed to be in my (our) best interest.     Revised 8/2/2021                                                                          Page 1 of 2       I acknowledge that health care personnel may be observing this procedure for the purpose of medical education or other specified purposes as may be necessary as requested and/or approved by my (our) physician. I (we) consent to the disposal by the hospital Pathologist of the removed tissue, parts or organs in accordance with hospital policy. I do ____ do not ____ consent to the use of a local infiltration pain blocking agent that will be used by my provider/surgical provider to help alleviate pain during my procedure. I do ____ do not ____ consent to an emergent blood transfusion in the case of a life-threatening situation that requires blood components to be administered. This consent is valid for 24 hours from the beginning of the procedure. This patient does ____ or does not ____ currently have a DNR status/order. If DNR order is in place, obtain Addendum to the Surgical Consent for ALL Patients with a DNR Order to address edwar-operative status for limited intervention or DNR suspension.      I have read and fully understand the above Consent for Operation/Procedure and that all blanks were completed before I signed the consent.   _____________________________       _____________________      ____/____am/pm  Signature of Patient or legal representative      Printed Name / Relationship            Date / Time   ____________________________       _____________________      ____/____am/pm  Witness to Signature                                    Printed Name                    Date / Time    If patient is unable to sign or is a minor, complete the following)  Patient is a minor, ____ years of age, or unable to sign because:   ______________________________________________________________________________________________    If a phone consent is obtained, consent will be documented by using two health care professionals, each affirming that the consenting party has no questions and gives consent for the procedure discussed with the physician/provider.   _____________________          ____________________       _____/_____am/pm   2nd witness to phone consent        Printed name           Date / Time    Informed Consent:  I have provided the explanation described above in section 1 to the patient and/or legal representative.  I have provided the patient and/or legal representative with an opportunity to ask any questions about the proposed operation/procedure.   ___________________________          ____________________         ____/____am/pm  Provider / Proceduralist                            Printed name            Date / Time  Revised 8/2/2021                                                                      Page 2 of 2

## 2023-01-10 NOTE — PROGRESS NOTES
PRE-OP INSTRUCTIONS FOR SURGICAL PATIENTS          Our Pre-admission Testing Nurses tried and were unable to reach you today. Please read the attached instructions if you did not listen to your voicemail. Follow all instructions provided to you from your surgeon's office, including your ARRIVAL TIME. Arrange for someone to drive you home and be with you for the first 24 hours after discharge. NOTE: at this time ONLY 2 ADULTS may accompany you   One person encouraged to stay at hospital entire time if outpatient surgery    Enter the MAIN entrance located on [address] and report to the surgical desk on the LEFT side of the lobby. Please park in the parking garage or there is free [employer] available after 7am for your use. Bring your insurance card & photo ID with you to register. Bring your medication list with you with dose and frequency listed (including over the counter medications)  Contact your ordering physician/surgeon for medication instructions as soon as possible, especially if taking blood thinners, aspirin, heart, or diabetic medication. Bariatric surgical patients need to call your surgeon if on diabetic medications (as some may need to be stopped 1-week preop)  A Pre-Surgical History and Physical MUST be completed WITHIN 30 DAYS OR LESS prior to your procedure by your Physician or an Urgent Care. DO NOT EAT ANYTHING 8 hours prior to arrival for surgery. You may have sips of WATER ONLY (up to 8 ounces) 4 hours prior to your arrival for surgery. Then nothing further 4 hours prior to arriving at hospital.   NOTE: ALL Gastric, Bariatric & Bowel surgery patients - you MUST follow your surgeon's instructions regarding eating/drinking as you will have very specific instructions to follow. If you did not receive these, call your surgeon's office immediately. No gum, candy, mints, or ice chips day of procedure.    Please refrain from drinking alcohol the day before or day of your procedure. Do not use any recreational marijuana at least 24 hours or street drugs (heroin, cocaine) at minimum 5 days prior to your procedure. Please do not smoke the day of your procedure. Dress in loose, comfortable clothing appropriate for redressing after your procedure. Do not wear jewelry (including body piercings), make-up, fingernail polish, lotion, powders, or metal hairclips. Contacts, glasses, dentures, and hearing aids will need to be removed prior to surgery. Bring your case(s) to protect them while you are in surgery. If you use a CPAP, please bring it with you on the day of your procedure. Do not shave or wax for 72 hours prior to procedure near your operative site. Leave all other valuables at home. IF there is a change in your physical condition for some reason, such as: a cold, fever, rash, cuts, sores, or any other infection, especially near your surgical site, contact your surgeon's office immediately. FOR WOMAN OF CHILDBEARING AGE ONLY- please make sure we can collect a urine sample upon arrival.  Pt told to hold Plavix as directed by Cardiologist and to hold Losartan and diabetic meds the DOS. Instructions left on patient's voicemail.   Mario Campuzano RN.1/10/2023 .9:55 AM

## 2023-01-10 NOTE — TELEPHONE ENCOUNTER
Called pharmacy in regards to message below and they state Xarelto does not require PA but it will run pt [phone] for a 30 day supply. Please advise if pt should start Pt assistance and if he should stay on Plavix.

## 2023-01-10 NOTE — TELEPHONE ENCOUNTER
If patient is willing to complete financial assistance please facilitate. But if cost prohibitive he should remain on the Plavix and ASA.

## 2023-01-12 NOTE — PROGRESS NOTES
H&P done at [facility]. Pt had interventional cardiology balloon angio and atherectomy done 1-5 and started on aggressive medication therapy taking ASA and Xarelto BID. Spoke to Dr. Jvaier Grover asking if cardiac clearance needed prior to surgery 1-13, replied no.  Sunil Woodard

## 2023-01-13 ENCOUNTER — HOSPITAL ENCOUNTER (OUTPATIENT)
Age: 73
Setting detail: OUTPATIENT SURGERY
Discharge: HOME | End: 2023-01-13
Attending: PODIATRIST | Admitting: PODIATRIST
Payer: MEDICARE

## 2023-01-13 ENCOUNTER — ANESTHESIA (OUTPATIENT)
Dept: OPERATING ROOM | Age: 73
End: 2023-01-13
Payer: MEDICARE

## 2023-01-13 ENCOUNTER — ANESTHESIA EVENT (OUTPATIENT)
Dept: OPERATING ROOM | Age: 73
End: 2023-01-13
Payer: MEDICARE

## 2023-01-13 VITALS
DIASTOLIC BLOOD PRESSURE: 65 MMHG | BODY MASS INDEX: 31.1 KG/M2 | RESPIRATION RATE: 12 BRPM | OXYGEN SATURATION: 100 % | SYSTOLIC BLOOD PRESSURE: 155 MMHG | HEIGHT: 69 IN | HEART RATE: 73 BPM | WEIGHT: 210 LBS | TEMPERATURE: 97 F

## 2023-01-13 LAB
GLUCOSE BLD-MCNC: 165 MG/DL (ref 70–99)
GLUCOSE BLD-MCNC: 178 MG/DL (ref 70–99)
PERFORMED ON: ABNORMAL
PERFORMED ON: ABNORMAL

## 2023-01-13 PROCEDURE — C1713 ANCHOR/SCREW BN/BN,TIS/BN: HCPCS | Performed by: PODIATRIST

## 2023-01-13 PROCEDURE — 3600000014 HC SURGERY LEVEL 4 ADDTL 15MIN: Performed by: PODIATRIST

## 2023-01-13 PROCEDURE — 2580000003 HC RX 258: Performed by: ANESTHESIOLOGY

## 2023-01-13 PROCEDURE — 2709999900 HC NON-CHARGEABLE SUPPLY: Performed by: PODIATRIST

## 2023-01-13 PROCEDURE — 3600000004 HC SURGERY LEVEL 4 BASE: Performed by: PODIATRIST

## 2023-01-13 PROCEDURE — 7100000001 HC PACU RECOVERY - ADDTL 15 MIN: Performed by: PODIATRIST

## 2023-01-13 PROCEDURE — 3700000000 HC ANESTHESIA ATTENDED CARE: Performed by: PODIATRIST

## 2023-01-13 PROCEDURE — 2580000003 HC RX 258: Performed by: PODIATRIST

## 2023-01-13 PROCEDURE — 7100000010 HC PHASE II RECOVERY - FIRST 15 MIN: Performed by: PODIATRIST

## 2023-01-13 PROCEDURE — 7100000011 HC PHASE II RECOVERY - ADDTL 15 MIN: Performed by: PODIATRIST

## 2023-01-13 PROCEDURE — 6360000002 HC RX W HCPCS: Performed by: NURSE ANESTHETIST, CERTIFIED REGISTERED

## 2023-01-13 PROCEDURE — 6360000002 HC RX W HCPCS: Performed by: PODIATRIST

## 2023-01-13 PROCEDURE — 2500000003 HC RX 250 WO HCPCS: Performed by: PODIATRIST

## 2023-01-13 PROCEDURE — 7100000000 HC PACU RECOVERY - FIRST 15 MIN: Performed by: PODIATRIST

## 2023-01-13 PROCEDURE — 3700000001 HC ADD 15 MINUTES (ANESTHESIA): Performed by: PODIATRIST

## 2023-01-13 DEVICE — GRAFT SFT TISS 2 CC FLOWABLE PLCNTA MTRX VIAFLOW: Type: IMPLANTABLE DEVICE | Site: FOOT | Status: FUNCTIONAL

## 2023-01-13 DEVICE — ACELLULAR DERMAL MATRIX
Type: IMPLANTABLE DEVICE | Site: FOOT | Status: FUNCTIONAL
Brand: PROLAYER XENOGRAFT

## 2023-01-13 RX ORDER — FENTANYL CITRATE 50 UG/ML
INJECTION, SOLUTION INTRAMUSCULAR; INTRAVENOUS PRN
Status: DISCONTINUED | OUTPATIENT
Start: 2023-01-13 | End: 2023-01-13 | Stop reason: SDUPTHER

## 2023-01-13 RX ORDER — SODIUM CHLORIDE 0.9 % (FLUSH) 0.9 %
5-40 SYRINGE (ML) INJECTION EVERY 12 HOURS SCHEDULED
Status: DISCONTINUED | OUTPATIENT
Start: 2023-01-13 | End: 2023-01-13 | Stop reason: HOSPADM

## 2023-01-13 RX ORDER — SODIUM CHLORIDE 9 MG/ML
INJECTION, SOLUTION INTRAVENOUS PRN
Status: CANCELLED | OUTPATIENT
Start: 2023-01-13

## 2023-01-13 RX ORDER — ONDANSETRON 2 MG/ML
4 INJECTION INTRAMUSCULAR; INTRAVENOUS
Status: CANCELLED | OUTPATIENT
Start: 2023-01-13 | End: 2023-01-14

## 2023-01-13 RX ORDER — OXYCODONE HYDROCHLORIDE 5 MG/1
5 TABLET ORAL
Status: CANCELLED | OUTPATIENT
Start: 2023-01-13 | End: 2023-01-14

## 2023-01-13 RX ORDER — SODIUM CHLORIDE 0.9 % (FLUSH) 0.9 %
5-40 SYRINGE (ML) INJECTION PRN
Status: CANCELLED | OUTPATIENT
Start: 2023-01-13

## 2023-01-13 RX ORDER — ONDANSETRON 2 MG/ML
INJECTION INTRAMUSCULAR; INTRAVENOUS PRN
Status: DISCONTINUED | OUTPATIENT
Start: 2023-01-13 | End: 2023-01-13 | Stop reason: SDUPTHER

## 2023-01-13 RX ORDER — PROPOFOL 10 MG/ML
INJECTION, EMULSION INTRAVENOUS PRN
Status: DISCONTINUED | OUTPATIENT
Start: 2023-01-13 | End: 2023-01-13 | Stop reason: SDUPTHER

## 2023-01-13 RX ORDER — LIDOCAINE HYDROCHLORIDE 20 MG/ML
INJECTION, SOLUTION INTRAVENOUS PRN
Status: DISCONTINUED | OUTPATIENT
Start: 2023-01-13 | End: 2023-01-13 | Stop reason: SDUPTHER

## 2023-01-13 RX ORDER — FENTANYL CITRATE 50 UG/ML
25 INJECTION, SOLUTION INTRAMUSCULAR; INTRAVENOUS EVERY 5 MIN PRN
Status: CANCELLED | OUTPATIENT
Start: 2023-01-13

## 2023-01-13 RX ORDER — SODIUM CHLORIDE 0.9 % (FLUSH) 0.9 %
5-40 SYRINGE (ML) INJECTION PRN
Status: DISCONTINUED | OUTPATIENT
Start: 2023-01-13 | End: 2023-01-13 | Stop reason: HOSPADM

## 2023-01-13 RX ORDER — MEPERIDINE HYDROCHLORIDE 25 MG/ML
12.5 INJECTION INTRAMUSCULAR; INTRAVENOUS; SUBCUTANEOUS EVERY 5 MIN PRN
Status: CANCELLED | OUTPATIENT
Start: 2023-01-13

## 2023-01-13 RX ORDER — PROCHLORPERAZINE EDISYLATE 5 MG/ML
5 INJECTION INTRAMUSCULAR; INTRAVENOUS
Status: CANCELLED | OUTPATIENT
Start: 2023-01-13 | End: 2023-01-14

## 2023-01-13 RX ORDER — LIDOCAINE HYDROCHLORIDE 20 MG/ML
INJECTION, SOLUTION EPIDURAL; INFILTRATION; INTRACAUDAL; PERINEURAL PRN
Status: DISCONTINUED | OUTPATIENT
Start: 2023-01-13 | End: 2023-01-13 | Stop reason: ALTCHOICE

## 2023-01-13 RX ORDER — HYDRALAZINE HYDROCHLORIDE 20 MG/ML
10 INJECTION INTRAMUSCULAR; INTRAVENOUS
Status: CANCELLED | OUTPATIENT
Start: 2023-01-13

## 2023-01-13 RX ORDER — SODIUM CHLORIDE, SODIUM LACTATE, POTASSIUM CHLORIDE, CALCIUM CHLORIDE 600; 310; 30; 20 MG/100ML; MG/100ML; MG/100ML; MG/100ML
INJECTION, SOLUTION INTRAVENOUS CONTINUOUS
Status: DISCONTINUED | OUTPATIENT
Start: 2023-01-13 | End: 2023-01-13 | Stop reason: HOSPADM

## 2023-01-13 RX ORDER — SODIUM CHLORIDE 0.9 % (FLUSH) 0.9 %
5-40 SYRINGE (ML) INJECTION EVERY 12 HOURS SCHEDULED
Status: CANCELLED | OUTPATIENT
Start: 2023-01-13

## 2023-01-13 RX ORDER — FENTANYL CITRATE 50 UG/ML
50 INJECTION, SOLUTION INTRAMUSCULAR; INTRAVENOUS EVERY 5 MIN PRN
Status: CANCELLED | OUTPATIENT
Start: 2023-01-13

## 2023-01-13 RX ORDER — LABETALOL HYDROCHLORIDE 5 MG/ML
10 INJECTION, SOLUTION INTRAVENOUS
Status: CANCELLED | OUTPATIENT
Start: 2023-01-13

## 2023-01-13 RX ORDER — LIDOCAINE HYDROCHLORIDE 10 MG/ML
1 INJECTION, SOLUTION EPIDURAL; INFILTRATION; INTRACAUDAL; PERINEURAL
Status: DISCONTINUED | OUTPATIENT
Start: 2023-01-13 | End: 2023-01-13 | Stop reason: HOSPADM

## 2023-01-13 RX ADMIN — PROPOFOL 150 MG: 10 INJECTION, EMULSION INTRAVENOUS at 09:21

## 2023-01-13 RX ADMIN — FENTANYL CITRATE 50 MCG: 50 INJECTION, SOLUTION INTRAMUSCULAR; INTRAVENOUS at 09:30

## 2023-01-13 RX ADMIN — CEFAZOLIN 2000 MG: 2 INJECTION, POWDER, FOR SOLUTION INTRAMUSCULAR; INTRAVENOUS at 09:15

## 2023-01-13 RX ADMIN — LIDOCAINE HYDROCHLORIDE 100 MG: 20 INJECTION, SOLUTION INTRAVENOUS at 09:21

## 2023-01-13 RX ADMIN — ONDANSETRON 4 MG: 2 INJECTION INTRAMUSCULAR; INTRAVENOUS at 09:52

## 2023-01-13 RX ADMIN — FENTANYL CITRATE 25 MCG: 50 INJECTION, SOLUTION INTRAMUSCULAR; INTRAVENOUS at 09:48

## 2023-01-13 RX ADMIN — SODIUM CHLORIDE, SODIUM LACTATE, POTASSIUM CHLORIDE, AND CALCIUM CHLORIDE: .6; .31; .03; .02 INJECTION, SOLUTION INTRAVENOUS at 07:29

## 2023-01-13 RX ADMIN — PHENYLEPHRINE HYDROCHLORIDE 100 MCG: 10 INJECTION, SOLUTION INTRAMUSCULAR; INTRAVENOUS; SUBCUTANEOUS at 09:52

## 2023-01-13 RX ADMIN — PHENYLEPHRINE HYDROCHLORIDE 100 MCG: 10 INJECTION, SOLUTION INTRAMUSCULAR; INTRAVENOUS; SUBCUTANEOUS at 09:35

## 2023-01-13 RX ADMIN — FENTANYL CITRATE 25 MCG: 50 INJECTION, SOLUTION INTRAMUSCULAR; INTRAVENOUS at 10:02

## 2023-01-13 ASSESSMENT — PAIN - FUNCTIONAL ASSESSMENT
PAIN_FUNCTIONAL_ASSESSMENT: 0-10
PAIN_FUNCTIONAL_ASSESSMENT: PREVENTS OR INTERFERES SOME ACTIVE ACTIVITIES AND ADLS

## 2023-01-13 ASSESSMENT — PAIN DESCRIPTION - LOCATION: LOCATION: FOOT

## 2023-01-13 ASSESSMENT — PAIN SCALES - GENERAL
PAINLEVEL_OUTOF10: 3
PAINLEVEL_OUTOF10: 0

## 2023-01-13 ASSESSMENT — PAIN DESCRIPTION - PAIN TYPE: TYPE: SURGICAL PAIN;CHRONIC PAIN

## 2023-01-13 ASSESSMENT — PAIN DESCRIPTION - ONSET: ONSET: ON-GOING

## 2023-01-13 ASSESSMENT — PAIN DESCRIPTION - DESCRIPTORS: DESCRIPTORS: ACHING

## 2023-01-13 ASSESSMENT — PAIN DESCRIPTION - ORIENTATION: ORIENTATION: LEFT

## 2023-01-13 ASSESSMENT — PAIN DESCRIPTION - FREQUENCY: FREQUENCY: CONTINUOUS

## 2023-01-13 NOTE — PROGRESS NOTES
PACU Transfer to [facility]    Vitals:    01/13/23 1100   BP: 90/76   Pulse: 71   Resp: 12   Temp: 97.5 °F (36.4 °C)   SpO2: 100%         Intake/Output Summary (Last 24 hours) at 1/13/2023 1110  Last data filed at 1/13/2023 1110  Gross per 24 hour   Intake 650 ml   Output 5 ml   Net 645 ml       Pain assessment:  none  Pain Level: 0    Patient transferred to care of [facility] RN.    1/13/2023 11:10 AM

## 2023-01-13 NOTE — PROGRESS NOTES
Pt had concerns about dressing and orientation of tubing for cryotherapy. Spoke with Dr. Kari Jackson and he is unavailable to come bedside as he is at clinic. Dr. Pierce Manual states dressing is exactly as it should be. Message passed along to patient and wife who verbalize understanding. Ambulatory Surgery/Procedure Discharge Note    Vitals:    01/13/23 1115   BP: (!) 155/65   Pulse: 73   Resp: 12   Temp: 97 °F (36.1 °C)   SpO2: 100%       In: 650 [P.O.:100; I.V.:500]  Out: 5     Restroom use offered before discharge. Yes    Pain assessment:  level of pain (1-10, 10 severe),   Pain Level: 3    Pt and S.O./family states \"ready to go home\". Pt alert and oriented x4. IV removed. Denies N/V. Reports 3 to 4 out of 10 pain, but declines any pain medication. Dressing C, D & I. Discharge instructions given to pt and wife with pt permission. Pt and wife verbalized understanding of all instructions. Left with all belongings and discharge instructions. Patient discharged to home/self care.  Patient discharged via wheel chair by transporter to waiting family/S.O.       1/13/2023 11:55 AM

## 2023-01-13 NOTE — BRIEF OP NOTE
Brief Postoperative Note      Patient: Carmelo Hancock  YOB: 1950  MRN: [id number]    Date of Procedure: 1/13/2023    Pre-Op Diagnosis: Dehiscence of operative wound, subsequent encounter [T81.31XD] Left Foot    Post-Op Diagnosis: Same       Procedure(s):  SURGICAL PREPARATION OF WOUND BED LEFT FOOT, APPLICATION OF DERMAL GRAFT LEFT FOOT    Surgeon(s):  Pavel Ding DPM    Assistant:  Resident: Rosetta Ulloa DPM    Anesthesia: General    Hemostasis: anatomic dissection    Materials: 3-0 Nylon    Injectables:  Pre-op: 10 cc of 2% Lidocaine plain    Estimated Blood Loss (mL): less than 50     Complications: None    Specimens:   * No specimens in log *    Implants:  Implant Name Type Inv. Item Serial No.  Lot No. LRB No. Used Action   GRAFT SFT TISS 2 CC FLOWABLE PLCNTA MTRX VIAFLOW - [id number]  GRAFT SFT TISS 2 CC FLOWABLE PLCNTA MTRX VIAFLOW [id number] [facility]  Left 1 Implanted   GRAFT SFT TISS 2 CC FLOWABLE PLCNTA MTRX VIAFLOW - [id number]  GRAFT SFT TISS 2 CC FLOWABLE PLCNTA MTRX VIAFLOW [id number] [employer]  Left 1 Implanted   MESH SURG [id number] THK1. 1MM ACELLULAR DERM MATRIXXENOGRAFT - [id number]  MESH SURG [id number] [facility]. 1MM ACELLULAR DERM [facility] ORTHOPEDICS [facility] [id number] Left 1 Implanted         Drains: * No LDAs found *    Findings: see op note    Rosetta Ulloa DPM   Podiatric Resident PGY1  Pager [phone] or [employer]  1/13/2023, 10:02 AM     Electronically signed by Rosetta Ulloa DPM on 1/13/2023 at 10:01 AM

## 2023-01-13 NOTE — ANESTHESIA PRE PROCEDURE
Department of Anesthesiology  Preprocedure Note       Name:  Sony Raymond   Age:  81 y.o.  :  1950                                          MRN:  [id number]         Date:  2023      Surgeon: Fang Partida):  Zhane Tovar DPM    Procedure: Procedure(s):  SURGICAL PREPARATION OF WOUND BED LEFT FOOT, APPLICATION OF DERMAL GRAFT LEFT FOOT    Medications prior to admission:   Prior to Admission medications    Medication Sig Start Date End Date Taking? Authorizing Provider   sulfamethoxazole-trimethoprim (BACTRIM DS;SEPTRA DS) 800-160 MG per tablet TAKE 1 TABLET BY MOUTH TWICE DAILY  Patient not taking: Reported on 22   Historical Provider, MD   Continuous Blood Gluc Sensor (FREESTYLE TOO 14 DAY SENSOR) MISC USE EVERY 14 DAYS FOR GLUCOSE MONITORING 22   Historical Provider, MD   clindamycin (CLEOCIN) 300 MG capsule TAKE 1 CAPSULE BY MOUTH THREE TIMES DAILY  Patient not taking: Reported on 2023 10/5/22   Historical Provider, MD   promethazine (PHENERGAN) 25 MG tablet Take 25 mg by mouth every 6 hours as needed  Patient not taking: Reported on 2023   Historical Provider, MD   oxyCODONE-acetaminophen (PERCOCET) 7.5-325 MG per tablet Take 1 tablet by mouth every 6 hours as needed.   Patient not taking: Reported on 2023   Historical Provider, MD   rivaroxaban (XARELTO) 2.5 MG TABS tablet Take 1 tablet by mouth 2 times daily 23   Arely Mccormick MD   aspirin 81 MG EC tablet Take 81 mg by mouth daily    Historical Provider, MD   metFORMIN (GLUCOPHAGE) 500 MG tablet Take 500 mg by mouth 2 times daily (with meals) 2 tabs in am and 1 tab po hs    Historical Provider, MD   losartan (COZAAR) 25 MG tablet Take 25 mg by mouth daily    Historical Provider, MD   rosuvastatin (CRESTOR) 20 MG tablet Take 20 mg by mouth daily    Historical Provider, MD   glipiZIDE (GLUCOTROL) 5 MG tablet Take 5 mg by mouth 2 times daily (before meals)    Historical Provider, MD Current medications:    No current facility-administered medications for this visit. No current outpatient medications on file. Facility-Administered Medications Ordered in Other Visits   Medication Dose Route Frequency Provider Last Rate Last Admin    ceFAZolin (ANCEF) 2,000 mg in sodium chloride 0.9 % 50 mL IVPB (mini-bag)  2,000 mg IntraVENous Once Efrain Mcfarlane DPM        lidocaine PF 1 % injection 1 mL  1 mL IntraDERmal Once PRN Cem Morales MD        lactated ringers infusion   IntraVENous Continuous Cem Morales  mL/hr at 01/13/23 0729 New Bag at 01/13/23 0729    sodium chloride flush 0.9 % injection 5-40 mL  5-40 mL IntraVENous 2 times per day Cem Morales MD        sodium chloride flush 0.9 % injection 5-40 mL  5-40 mL IntraVENous PRN Cem Morales MD           Allergies: Allergies   Allergen Reactions    Penicillins Other (See Comments)     Unknown reaction; Patient states reaction occurred as a child but he cannot recall what reaction was. Patient tolerated cefepime as an inpatient on 11/21/22.      Ace Inhibitors      cough    Pcn [Penicillins]        Problem List:    Patient Active Problem List   Diagnosis Code    Type 2 diabetes mellitus with left diabetic foot ulcer [facility] E11.621, L97.529       Past Medical History:        Diagnosis Date    CAD (coronary artery disease)     CKD (chronic kidney disease) stage 3, GFR 30-59 ml/min ([facility])     Diabetes mellitus ([facility]     DM (diabetes mellitus) ([facility]     HLD (hyperlipidemia)     HTN (hypertension)     Hyperlipidemia     Hypertension     Ischemic stroke [facility])        Past Surgical History:        Procedure Laterality Date    ANKLE SURGERY Left 9/19/2022    REPAIR PERONEUS BREVIS-LEFT FOOT performed by Efrain Mcfarlane DPM at [address] Left 11/22/2022    LEFT FOOT DEBRIDEMENT INCISION AND DRAINAGE WITH 5TH METATARSAL OSTECTOMY AND DELAYED PRIMARY performed by Mack Mccray THA Acuña at [address] Left 9/19/2022    OSTECTOMY FIFTH METATARSAL-LEFT-POPLITEAL BLOCK-LIZANDRO performed by Marylin Gutierres DPM at [facility] SURGERY         Social History:    Social History     Tobacco Use    Smoking status: Never    Smokeless tobacco: Never   Substance Use Topics    Alcohol use: Not Currently                                Counseling given: Not Answered      Vital Signs (Current): There were no vitals filed for this visit. BP Readings from Last 3 Encounters:   01/13/23 137/68   01/05/23 (!) 142/80   12/29/22 126/80       NPO Status:                                                                                 BMI:   Wt Readings from Last 3 Encounters:   01/13/23 210 lb (95.3 kg)   01/05/23 208 lb (94.3 kg)   12/29/22 211 lb (95.7 kg)     There is no height or weight on file to calculate BMI.    CBC:   Lab Results   Component Value Date/Time    WBC 6.0 01/05/2023 01:10 PM    RBC 4.28 01/05/2023 01:10 PM    HGB 12.5 01/05/2023 01:10 PM    HCT 39.1 01/05/2023 01:10 PM    MCV 91.4 01/05/2023 01:10 PM    RDW 15.1 01/05/2023 01:10 PM     01/05/2023 01:10 PM       CMP:   Lab Results   Component Value Date/Time     01/05/2023 01:10 PM    K 4.7 01/05/2023 01:10 PM    K 4.3 11/21/2022 11:30 AM     01/05/2023 01:10 PM    CO2 22 01/05/2023 01:10 PM    BUN 26 01/05/2023 01:10 PM    CREATININE 1.3 01/05/2023 01:10 PM    LABGLOM 59 01/05/2023 01:10 PM    GLUCOSE 172 01/05/2023 01:10 PM    CALCIUM 9.5 01/05/2023 01:10 PM       POC Tests:   No results for input(s): POCGLU, POCNA, POCK, POCCL, POCBUN, POCHEMO, POCHCT in the last 72 hours.     Coags:   Lab Results   Component Value Date/Time    PROTIME 14.7 11/21/2022 11:30 AM    INR 1.16 11/21/2022 11:30 AM       HCG (If Applicable): No results found for: PREGTESTUR, PREGSERUM, HCG, HCGQUANT     ABGs: No results found for: PHART, PO2ART, JTH7ODV, SST6XJV, BEART, R7PQNVCC     Type & Screen (If Applicable):  No results found for: LABABO, LABRH    Drug/Infectious Status (If Applicable):  No results found for: HIV, HEPCAB    COVID-19 Screening (If Applicable): No results found for: COVID19        Anesthesia Evaluation  Patient summary reviewed and Nursing notes reviewed no history of anesthetic complications:   Airway: Mallampati: II  TM distance: >3 FB   Neck ROM: full  Mouth opening: > = 3 FB   Dental:    (+) poor dentition      Pulmonary:Negative Pulmonary ROS                              Cardiovascular:    (+) hypertension:, CAD:,         Rhythm: regular  Rate: normal                    Neuro/Psych:   (+) CVA:,             GI/Hepatic/Renal: Neg GI/Hepatic/Renal ROS            Endo/Other:    (+) DiabetesType II DM, , .                 Abdominal:             Vascular: negative vascular ROS. Other Findings:           Anesthesia Plan      general     ASA 3       Induction: intravenous. MIPS: Postoperative opioids intended and Prophylactic antiemetics administered. Anesthetic plan and risks discussed with patient. Plan discussed with CRNA.     Attending anesthesiologist reviewed and agrees with Preprocedure content                Yuli Ken MD   1/13/2023

## 2023-01-13 NOTE — ANESTHESIA POSTPROCEDURE EVALUATION
Department of Anesthesiology  Postprocedure Note    Patient: Amelia Mcnally  MRN: [id number]  YOB: 1950  Date of evaluation: 1/13/2023      Procedure Summary     Date: 01/13/23 Room / Location: [address]    Anesthesia Start: 3881 Anesthesia Stop: 1004    Procedure: SURGICAL PREPARATION OF WOUND BED LEFT FOOT, APPLICATION OF DERMAL GRAFT LEFT FOOT (Left) Diagnosis:       Dehiscence of operative wound, subsequent encounter      (Dehiscence of operative wound, subsequent encounter [T81.31XD] Left Foot)    Surgeons: Jefry Benitez DPM Responsible Provider: Angy Varela MD    Anesthesia Type: general ASA Status: 3          Anesthesia Type: No value filed.     Kranthi Phase I: Kranthi Score: 4    Kranthi Phase II:        Anesthesia Post Evaluation    Patient location during evaluation: PACU  Patient participation: complete - patient participated  Level of consciousness: awake and alert  Airway patency: patent  Nausea & Vomiting: no nausea and no vomiting  Complications: no  Cardiovascular status: hemodynamically stable  Respiratory status: acceptable  Hydration status: euvolemic  Multimodal analgesia pain management approach

## 2023-01-13 NOTE — TELEPHONE ENCOUNTER
Called pt in regards to message below and he states he is not interested in completing any type of pt assistance.

## 2023-01-14 NOTE — OP NOTE
Operative Note      Patient: Fredrick Mitchell  YOB: 1950  MRN: [id number]    Date of Procedure: 1/13/2023    Pre-Op Diagnosis: Dehiscence of operative wound, subsequent encounter [T81.31XD] Left Foot    Post-Op Diagnosis: Same       Procedure(s):  SURGICAL PREPARATION OF WOUND BED LEFT FOOT, APPLICATION OF DERMAL GRAFT LEFT FOOT    Surgeon(s):  Corky Uribe DPM    Assistant:   Resident: Leonarda Freeman DPM    Hemostasis: anatomic dissection     Materials: 3-0 Nylon     Injectables:  Pre-op: 10 cc of 2% Lidocaine plain     Estimated Blood Loss (mL): less than 50      Complications: None     Specimens:   * No specimens in log *    Implants:  Implant Name Type Inv. Item Serial No.  Lot No. LRB No. Used Action   GRAFT SFT TISS 2 CC FLOWABLE PLCNTA MTRX VIAFLOW - [id number]  GRAFT SFT TISS 2 CC FLOWABLE PLCNTA MTRX VIAFLOW [id number] [employer] [facility]  Left 1 Implanted   GRAFT SFT TISS 2 CC FLOWABLE PLCNTA MTRX VIAFLOW - [id number]  GRAFT SFT TISS 2 CC FLOWABLE PLCNTA MTRX VIAFLOW [id number] [employer] [facility]  Left 1 Implanted   MESH SURG [id number]. 1MM ACELLULAR DERM MATRIXXENOGRAFT - [id number]  MESH SURG [id number] [address] [place] [address]. 1MM ACELLULAR DERM MATRIXXENOGRAFT  [employer] [facility] [id number] Left 1 Implanted         Drains: * No LDAs found *    Findings: Partial thickness ulceration with a mixed granular and fibrotic wound base appreciated along the lateral 5th metatarsal of the left foot measuring 4 cm x 2 cm. No purulence, fluctuance, crepitus or malodor. No probe to bone, no tunneling or tracking of wound. INDICATIONS FOR PROCEDURE: This patient has signs and symptoms clinically and radiographically consistent with the above mentioned preoperative diagnosis. Having failed conservative treatment, it was determined that the patient would benefit from surgical intervention.  All potential risks, benefits, and complications were discussed with the patient prior to the scheduling of surgery. All the patient's questions were answered and no guarantees were given. The patient wished to proceed with surgery, and informed written consent was obtained. DETAILS OF PROCEDURE: The patient was brought from the pre-operative area and placed on the operating table in the supine position. Following IV sedation, a local anesthetic block was then injected proximal to the incision site consisting of 10 cc of 2% Lidocaine plain. The left lower extremity was then scrubbed, prepped, and draped in the usual sterile fashion. A time-out was performed. The patient, procedure, and operative site were confirmed. No tourniquet was used for this procedure. Details of Procedure #1 Incision and drainage of the wound site: Attention was then turned to the lateral aspect of the left foot. Using a #15 blade and a curette the wound bed was debrided down to and including subcutaneous tissue until a healthy, granular and bleeding wound base was appreciated. After inspection of the surgical wound there was no other signs of the infection tracking. Next, pulse lavage was performed using copious amounts of sterile saline. Procedure #2: Application of Stravix Wound Graft and Injectable VIaFLow: Attention was directed to the full thickness wound on the lateral aspect of the left foot. At this time, a 4 x 4 cm inch ProLayer Graft was placed over the full thickness wound according to the manufacturers instructions to help augment the healing process post-operatively. Special care was taken to make sure that the ProLayer graft remained flat with underlying anatomical surface. Next, the graft was secured to the wound bed using 3-0 at the periphery of the wound. Upon completion, the graft was noted to lie flat against the wound bed and be secured in correct position. Attention was then drawn to the surgical site of the left foot.  After the ViaFlow was perpared per manufacture's instruction, it was then drawn up into a sterile syringe and injected within the surgical site. The graft was applied in efforts to reduce inflammation, and prevent adhesions and fibrotic scar tissue. A soft sterile dressing was applied consisting of adaptic, gauze, cast padding, the patient's cold therapy wrap type device secured in place with additional ACE bandage. END OF PROCEDURE: The patient tolerated the procedure and anesthesia well and was transported from the operating room to the PACU with vital signs stable and vascular status intact to all aspects of the patient's left lower extremity and digital capillary refill time immediate to the digits of the left foot. Following a period of post-operative monitoring, the patient will be discharged home with written and oral wound care and follow-up instructions. The patient is to follow-up with Dr. Blake Campbell DPM in his private office within 5-7 days. The patient is to keep dressing clean, dry and intact at all times. The patient is to call if any complications occur.     This operative report was dictated on behalf of Dr. Blake Campbell, Patti Wong DPM   Podiatric Resident PGY1  Pager [phone] or Radha  1/14/2023, 6:45 AM      Electronically signed by Ky Zamora DPM on 1/14/2023 at 6:35 AM

## 2023-01-18 NOTE — H&P
Date of Surgery Update:     Yogi Braden was seen, history and physical examination was reviewed, and patient examined today. There have been no significant clinical changes since the completion of the previous history and physical.     The nature of the procedure, possible complications, alternative forms of therapy, post-op course, and post-op goals have been explained to patient (or appropriate guardian) and patient's family and understanding was verbalized. All questions have been answered. The consent has been signed. Patient's surgical site has been marked. Patient wishes to proceed with surgery.      Dr. Pauline Marcial, [address] [place] [address]  Office: [phone]  Cell: [phone]

## 2023-02-06 ENCOUNTER — OFFICE VISIT (OUTPATIENT)
Dept: CARDIOLOGY CLINIC | Age: 73
End: 2023-02-06
Payer: MEDICARE

## 2023-02-06 VITALS — HEART RATE: 90 BPM | DIASTOLIC BLOOD PRESSURE: 80 MMHG | SYSTOLIC BLOOD PRESSURE: 128 MMHG | OXYGEN SATURATION: 99 %

## 2023-02-06 DIAGNOSIS — I25.10 CAD IN NATIVE ARTERY: ICD-10-CM

## 2023-02-06 DIAGNOSIS — I73.9 PAD (PERIPHERAL ARTERY DISEASE) (HCC): Primary | ICD-10-CM

## 2023-02-06 DIAGNOSIS — I10 ESSENTIAL HYPERTENSION: ICD-10-CM

## 2023-02-06 DIAGNOSIS — E78.2 MIXED HYPERLIPIDEMIA: ICD-10-CM

## 2023-02-06 PROCEDURE — 3074F SYST BP LT 130 MM HG: CPT | Performed by: INTERNAL MEDICINE

## 2023-02-06 PROCEDURE — G8484 FLU IMMUNIZE NO ADMIN: HCPCS | Performed by: INTERNAL MEDICINE

## 2023-02-06 PROCEDURE — 1036F TOBACCO NON-USER: CPT | Performed by: INTERNAL MEDICINE

## 2023-02-06 PROCEDURE — 3079F DIAST BP 80-89 MM HG: CPT | Performed by: INTERNAL MEDICINE

## 2023-02-06 PROCEDURE — G8427 DOCREV CUR MEDS BY ELIG CLIN: HCPCS | Performed by: INTERNAL MEDICINE

## 2023-02-06 PROCEDURE — 3017F COLORECTAL CA SCREEN DOC REV: CPT | Performed by: INTERNAL MEDICINE

## 2023-02-06 PROCEDURE — 1123F ACP DISCUSS/DSCN MKR DOCD: CPT | Performed by: INTERNAL MEDICINE

## 2023-02-06 PROCEDURE — 99214 OFFICE O/P EST MOD 30 MIN: CPT | Performed by: INTERNAL MEDICINE

## 2023-02-06 PROCEDURE — G8417 CALC BMI ABV UP PARAM F/U: HCPCS | Performed by: INTERNAL MEDICINE

## 2023-02-06 NOTE — PROGRESS NOTES
Interventional Cardiology Consultation     Myranda Yost  1950    PCP: Willy Adorno MD  Referring Physician: Dr. Rickey Storm  Reason for Referral: PAD   Chief Complaint: \"  Chief Complaint   Patient presents with    Follow-up     Subjective:   History of Present Illness: The patient is 81 y.o. male with a past medical history PAD, significant for DM, hypertension and hyperlipidemia. Patient had surgery on his left foot in September for a bone spur and postoperatively, wound opened afterwards. Since then he has c/o foot pain. He developed infection and is s/p left foot debridement incision and drainage by Dr. Barbara Cronin on 11/22/22. He underwent peripheral that resulted in atherectomy and balloon angioplasty of the left superficial femoral artery, left popliteal artery, and  left posterior tibial artery. Today, he states overall he is doing well. He continues to follow with podiatry and wearing a left walking shoe. He denies any claudication symptoms but non weight bearing on his left foot. \He denies any chest pains or worsening shortness of breath. He reports compliance with his medications and tolerating. He denies any abnormal bleeding or bruising. Patient denies exertional chest pain/pressure, dyspnea at rest, COELHO, PND, orthopnea, palpitations, lightheadedness, weight changes, changes in LE edema, and syncope.     Past Medical History:   Diagnosis Date    CAD (coronary artery disease)     CKD (chronic kidney disease) stage 3, GFR 30-59 ml/min ([facility])     Diabetes mellitus ([facility]     DM (diabetes mellitus) ([facility]     HLD (hyperlipidemia)     HTN (hypertension)     Hyperlipidemia     Hypertension     Ischemic stroke [facility])      Past Surgical History:   Procedure Laterality Date    ANKLE SURGERY Left 9/19/2022    REPAIR PERONEUS BREVIS-LEFT FOOT performed by Rickey Storm DPM at [address] [place] [address] Left 11/22/2022    LEFT FOOT DEBRIDEMENT INCISION AND DRAINAGE WITH 5TH METATARSAL OSTECTOMY AND DELAYED PRIMARY performed by Candy Palma DPM at [address] Left 1/13/2023    SURGICAL PREPARATION OF WOUND BED LEFT FOOT, APPLICATION OF DERMAL GRAFT LEFT FOOT performed by Candy Palma DPM at [address] Left 9/19/2022    OSTECTOMY FIFTH METATARSAL-LEFT-POPLITEAL BLOCK-LIZANDRO performed by Candy Palma DPM at [facility]       No family history on file. Social History     Tobacco Use    Smoking status: Never    Smokeless tobacco: Never   Vaping Use    Vaping Use: Never used   Substance Use Topics    Alcohol use: Not Currently    Drug use: Never       Allergies   Allergen Reactions    Penicillins Other (See Comments)     Unknown reaction; Patient states reaction occurred as a child but he cannot recall what reaction was. Patient tolerated cefepime as an inpatient on 11/21/22. Ace Inhibitors      cough    Pcn [Penicillins]        Current Outpatient Medications   Medication Sig Dispense Refill    Continuous Blood Gluc Sensor ([address] MobileYLE TOO 14 DAY SENSOR) MISC USE EVERY 14 DAYS FOR GLUCOSE MONITORING      clindamycin (CLEOCIN) 300 MG capsule       promethazine (PHENERGAN) 25 MG tablet Take 25 mg by mouth every 6 hours as needed      oxyCODONE-acetaminophen (PERCOCET) 7.5-325 MG per tablet Take 1 tablet by mouth every 6 hours as needed.       rivaroxaban (XARELTO) 2.5 MG TABS tablet Take 1 tablet by mouth 2 times daily 60 tablet 3    aspirin 81 MG EC tablet Take 81 mg by mouth daily      metFORMIN (GLUCOPHAGE) 500 MG tablet Take 500 mg by mouth 2 times daily (with meals) 2 tabs in am and 1 tab po hs      losartan (COZAAR) 25 MG tablet Take 25 mg by mouth daily      rosuvastatin (CRESTOR) 20 MG tablet Take 20 mg by mouth daily      glipiZIDE (GLUCOTROL) 5 MG tablet Take 5 mg by mouth 2 times daily (before meals)      sulfamethoxazole-trimethoprim (BACTRIM DS;SEPTRA DS) 800-160 MG per tablet  (Patient not taking: Reported on 2/6/2023)       No current facility-administered medications for this visit. Review of Systems:  Constitutional: No unanticipated weight loss. There's been no change in energy level, sleep pattern, or activity level. No fevers, chills. Eyes: No visual changes or diplopia. No scleral icterus. ENT: No Headaches, hearing loss or vertigo. No mouth sores or sore throat. Cardiovascular: as reviewed in HPI  Respiratory: No cough or wheezing, no sputum production. No hemoptysis. Gastrointestinal: No abdominal pain, appetite loss, blood in stools. No change in bowel or bladder habits. Genitourinary: No dysuria, trouble voiding, or hematuria. Musculoskeletal:  No gait disturbance, no joint complaints. Integumentary: No rash or pruritis. Neurological: No headache, diplopia, change in muscle strength, numbness or tingling. Psychiatric: No anxiety or depression. Endocrine: No temperature intolerance. No excessive thirst, fluid intake, or urination. No tremor. Hematologic/Lymphatic: No abnormal bruising or bleeding, blood clots or swollen lymph nodes. Allergic/Immunologic: No nasal congestion or hives. Physical Exam:   /80   Pulse 90   SpO2 99%   Wt Readings from Last 3 Encounters:   01/13/23 210 lb (95.3 kg)   01/05/23 208 lb (94.3 kg)   12/29/22 211 lb (95.7 kg)     Constitutional: He is oriented to person, place, and time. He appears well-developed and well-nourished. In no acute distress. Head: Normocephalic and atraumatic. Pupils equal and round. Neck: Neck supple. No JVP or carotid bruit appreciated. No mass and no thyromegaly present. No lymphadenopathy present. Cardiovascular: Normal rate. Normal heart sounds. Exam reveals no gallop and no friction rub. No murmur heard. Pulmonary/Chest: Effort normal and breath sounds normal. No respiratory distress. He has no wheezes, rhonchi or rales. Abdominal: Soft, non-tender.  Bowel sounds are normal. He exhibits no organomegaly, mass or bruit. Extremities: No edema. No cyanosis or clubbing. Pulses are 2+ radial/carotid bilaterally. Neurological: No gross cranial nerve deficit. Coordination normal.   Skin: Skin is warm and dry. There is no rash or diaphoresis. Psychiatric: He has a normal mood and affect. His speech is normal and behavior is normal.     Lab Review:   FLP:  No results found for: TRIG, HDL, LDLCALC, LDLDIRECT, LABVLDL  BUN/Creatinine:    Lab Results   Component Value Date/Time    BUN 26 01/05/2023 01:10 PM    CREATININE 1.3 01/05/2023 01:10 PM     PT/INR, TNI, HGB A1C: No results found for: PTINR, TROPONINI, LABA1C   No results found for: CBCAUTODIF    Arterial doppler 12/30/22  Right Impression  The right ankle/brachial index is non-compressible (PT->255 mmHg; DP->255  mmHg). Left Impression  The left ankle/brachial index is 1.22 (PT->255 mmHg; DP- 218 mmHg). Heavy calcific shadowing throughout the lower extremity arteries limits  visualization in some areas. Percentage of stenosis may be underestimated. There is normal, multiphasic flow identified in the common femoral artery,  suggesting no evidence of significant aorto-iliac inflow disease. Elevated velocities at the mid superficial femoral artery indicate a >50%  stenosis by velocity criteria (velocities went from 96 to 229 cm/s). There is atherosclerotic plaque involving the popliteal artery with velocities  consistent with <50% stenosis. There are no previous studies for comparison.     Peripheral 1/5/23   99% left mid SFA, 90% left popliteal artery stenosis  Status post successful atherectomy and balloon angioplasty of the left superficial femoral artery, left popliteal artery, and  left posterior tibial artery  Aggressive medical therapy for peripheral arterial disease, aspirin, Xarelto 2.5 twice daily, high intensity statin  Repeat Doppler in 3 months 6 months and 1 year  Outpatient follow-up in 4 to 6 weeks    All above diagnostic testing and laboratory data was independently visualized and reviewed by me (not simply review of report)       Assessment and Plan   1) PAD  -lateral left healing wound  -s/p I&D with Dr. Cindi Owens 1/13/23  -99% left mid SFA, 90% left popliteal artery stenosis  -s/p atherectomy and balloon angioplasty of the left SFA, left popliteal artery, and  left posterior tibial artery  -strong bounding pulse on exam   -aggressive medical therapy for peripheral arterial disease, aspirin, Xarelto 2.5 twice daily, high intensity statin  -repeat Doppler in 2 months 6 months and 1 year    2) Coronary artery disease  -likely  on angiogram with no stent placement and started on antiplatelet therapy. -denies any angina  -continue ASA and statin     3) Essential hypertension  -controlled  -continue medical therapy    4) Hyperlipidemia  -continue high intensity statin    5) Diabetes type II   -management per PCP  -following with Podiatry, Dr. Cindi Owens   -continue ASA and statin     Follow up in 3 months     Thank you very much for allowing me to participate in the care of your patient. Please do not hesitate to contact me if you have any questions. Zuleyka Donald MD [address] [place] [address], Interventional Cardiology, and Peripheral Vascular Disease   [facility]   Ph: [phone]  Fax: [phone]      This note was scribed in the presence of Dr Anyi Fernandez MD by Lawyer Veronica RN. Physician Attestation:  The scribes documentation has been prepared under my direction and personally reviewed by me in its entirety. I confirm the note above accurately reflects all work, treatment, procedures, and medical decision making performed by me.     Electronically signed by Ailyn Call MD on 2/6/2023 at 6:43 PM

## 2023-03-08 ENCOUNTER — HOSPITAL ENCOUNTER (OUTPATIENT)
Dept: VASCULAR LAB | Age: 73
Discharge: HOME | End: 2023-03-08
Payer: MEDICARE

## 2023-03-08 DIAGNOSIS — I73.9 PAD (PERIPHERAL ARTERY DISEASE) (HCC): ICD-10-CM

## 2023-03-08 PROCEDURE — 93926 LOWER EXTREMITY STUDY: CPT

## 2023-03-13 ENCOUNTER — TELEPHONE (OUTPATIENT)
Dept: CARDIOLOGY CLINIC | Age: 73
End: 2023-03-13

## 2023-05-08 ENCOUNTER — OFFICE VISIT (OUTPATIENT)
Dept: CARDIOLOGY CLINIC | Age: 73
End: 2023-05-08

## 2023-05-08 VITALS
HEIGHT: 69 IN | HEART RATE: 63 BPM | BODY MASS INDEX: 33.18 KG/M2 | WEIGHT: 224 LBS | DIASTOLIC BLOOD PRESSURE: 84 MMHG | SYSTOLIC BLOOD PRESSURE: 128 MMHG | OXYGEN SATURATION: 98 %

## 2023-05-08 DIAGNOSIS — I10 ESSENTIAL HYPERTENSION: ICD-10-CM

## 2023-05-08 DIAGNOSIS — M54.50 CHRONIC LOW BACK PAIN WITHOUT SCIATICA, UNSPECIFIED BACK PAIN LATERALITY: ICD-10-CM

## 2023-05-08 DIAGNOSIS — G89.29 CHRONIC LOW BACK PAIN WITHOUT SCIATICA, UNSPECIFIED BACK PAIN LATERALITY: ICD-10-CM

## 2023-05-08 DIAGNOSIS — E78.2 MIXED HYPERLIPIDEMIA: ICD-10-CM

## 2023-05-08 DIAGNOSIS — I25.10 CAD IN NATIVE ARTERY: ICD-10-CM

## 2023-05-08 DIAGNOSIS — I73.9 PAD (PERIPHERAL ARTERY DISEASE) (HCC): Primary | ICD-10-CM

## 2023-05-08 RX ORDER — CLOPIDOGREL BISULFATE 75 MG/1
75 TABLET ORAL DAILY
COMMUNITY

## 2023-05-08 NOTE — PROGRESS NOTES
Interventional Cardiology Consultation     Tess Hernandez  1950    PCP: Brandy Palacois MD  Referring Physician: Dr. Vicky Madrid  Reason for Referral: PAD   Chief Complaint: \"  Chief Complaint   Patient presents with    Follow-up     No cc     Subjective:   History of Present Illness: The patient is 81 y.o. male with a past medical history PAD, significant for DM, hypertension and hyperlipidemia. Patient had surgery on his left foot in September for a bone spur and postoperatively, wound opened afterwards. Since then he has c/o foot pain. He developed infection and is s/p left foot debridement incision and drainage by Dr. Izabel Alston on 11/22/22. He underwent peripheral that resulted in atherectomy and balloon angioplasty of the left superficial femoral artery, left popliteal artery, and  left posterior tibial artery. Today, he states overall he is doing well and his left foot wound has healed. He continues to follow with Dr Izabel Alston. He denies any new cardiac complaints and states he would like to start golfing again. He denies any chest pains or worsening shortness of breath. He reports compliance with his medications and tolerating. He denies any abnormal bleeding or bruising. Patient denies exertional chest pain/pressure, dyspnea at rest, worsening COELHO, PND, orthopnea, palpitations, lightheadedness, weight changes, changes in LE edema, and syncope.     Past Medical History:   Diagnosis Date    CAD (coronary artery disease)     CKD (chronic kidney disease) stage 3, GFR 30-59 ml/min ([facility])     Diabetes mellitus ([facility]     DM (diabetes mellitus) ([facility]     HLD (hyperlipidemia)     HTN (hypertension)     Hyperlipidemia     Hypertension     Ischemic stroke [facility])      Past Surgical History:   Procedure Laterality Date    ANKLE SURGERY Left 9/19/2022    REPAIR PERONEUS BREVIS-LEFT FOOT [facility] Vicky Madrid DPM [facility] Left 11/22/2022    LEFT FOOT

## 2024-02-23 NOTE — PROGRESS NOTES
METATARSAL-LEFT-POPLITEAL BLOCK-LIZANDRO performed by Antonio Acuña DPM at [facility] OR    HAND SURGERY       No family history on file.  Social History     Tobacco Use    Smoking status: Never    Smokeless tobacco: Never   Vaping Use    Vaping Use: Never used   Substance Use Topics    Alcohol use: Not Currently    Drug use: Never       Allergies   Allergen Reactions    Penicillins Other (See Comments)     Unknown reaction; Patient states reaction occurred as a child but he cannot recall what reaction was. Patient tolerated cefepime as an inpatient on 11/21/22.     Ace Inhibitors      cough    Pcn [Penicillins]        Current Outpatient Medications   Medication Sig Dispense Refill    clopidogrel (PLAVIX) 75 MG tablet Take 1 tablet by mouth daily      Continuous Blood Gluc Sensor (FREESTYLE TOO 14 DAY SENSOR) MISC USE EVERY 14 DAYS FOR GLUCOSE MONITORING      aspirin 81 MG EC tablet Take 1 tablet by mouth daily      metFORMIN (GLUCOPHAGE) 500 MG tablet Take 1 tablet by mouth 2 times daily (with meals) 2 tabs in am and 1 tab po hs      losartan (COZAAR) 25 MG tablet Take 1 tablet by mouth daily      rosuvastatin (CRESTOR) 20 MG tablet Take 1 tablet by mouth daily      glipiZIDE (GLUCOTROL) 5 MG tablet Take 1 tablet by mouth 2 times daily (before meals)      clindamycin (CLEOCIN) 300 MG capsule  (Patient not taking: Reported on 2/26/2024)       No current facility-administered medications for this visit.     Review of Systems:  Constitutional: No unanticipated weight loss. There's been no change in energy level, sleep pattern, or activity level. No fevers, chills.   Eyes: No visual changes or diplopia. No scleral icterus.  ENT: No Headaches, hearing loss or vertigo. No mouth sores or sore throat.  Cardiovascular: as reviewed in HPI  Respiratory: No cough or wheezing, no sputum production. No hemoptysis.     Gastrointestinal: No abdominal pain, appetite loss, blood in stools. No change in bowel or bladder

## 2024-02-26 ENCOUNTER — OFFICE VISIT (OUTPATIENT)
Dept: CARDIOLOGY CLINIC | Age: 74
End: 2024-02-26
Payer: MEDICARE

## 2024-02-26 VITALS
SYSTOLIC BLOOD PRESSURE: 130 MMHG | OXYGEN SATURATION: 96 % | HEIGHT: 69 IN | BODY MASS INDEX: 34.07 KG/M2 | WEIGHT: 230 LBS | DIASTOLIC BLOOD PRESSURE: 78 MMHG | HEART RATE: 96 BPM

## 2024-02-26 DIAGNOSIS — I73.9 PAD (PERIPHERAL ARTERY DISEASE) (HCC): Primary | ICD-10-CM

## 2024-02-26 PROCEDURE — 1036F TOBACCO NON-USER: CPT | Performed by: INTERNAL MEDICINE

## 2024-02-26 PROCEDURE — G8417 CALC BMI ABV UP PARAM F/U: HCPCS | Performed by: INTERNAL MEDICINE

## 2024-02-26 PROCEDURE — 1123F ACP DISCUSS/DSCN MKR DOCD: CPT | Performed by: INTERNAL MEDICINE

## 2024-02-26 PROCEDURE — G8427 DOCREV CUR MEDS BY ELIG CLIN: HCPCS | Performed by: INTERNAL MEDICINE

## 2024-02-26 PROCEDURE — 99213 OFFICE O/P EST LOW 20 MIN: CPT | Performed by: INTERNAL MEDICINE

## 2024-02-26 PROCEDURE — 3017F COLORECTAL CA SCREEN DOC REV: CPT | Performed by: INTERNAL MEDICINE

## 2024-02-26 PROCEDURE — G8484 FLU IMMUNIZE NO ADMIN: HCPCS | Performed by: INTERNAL MEDICINE

## 2024-07-30 ENCOUNTER — TELEPHONE (OUTPATIENT)
Dept: CARDIOLOGY CLINIC | Age: 74
End: 2024-07-30

## 2024-07-30 NOTE — TELEPHONE ENCOUNTER
Bhaskar was wondering if he needed to talk to his podiatrist first, he wanted to disregard his msg but got him to answer questions to assess his symptoms. Bhaskar shares his R foot/leg is swelling, noticed it is a bit bigger than the L. He is not using compression or elevating. He is walking just fine, there is no pain or redness - he has been out in the sun though. No new wounds.     Bhaskar's callback: [phone]

## 2024-07-30 NOTE — TELEPHONE ENCOUNTER
Spoke with pt regarding message below. States that he has been using elevation, ambulation, and compression. Still swollen and pain. Feels like left leg before blockage.

## 2024-07-30 NOTE — TELEPHONE ENCOUNTER
Bhaskar called in he states his right leg looks swollen,he states its not the same size has his left leg. He states it has been like that for 5-6 months. He would like a return call.      He can be reached at [phone].

## 2024-07-30 NOTE — TELEPHONE ENCOUNTER
Can we call the patient to further assess his symptoms and what's going on?  Is he using compression, elevation, and ambulation?  Any new wounds? Redness? Pain?    Thanks

## 2024-07-30 NOTE — TELEPHONE ENCOUNTER
Encourage patient to utilize compression, elevation, and ambulation as tolerable. Reach out if symptoms worsen or fail to improve. Can advise with Dr. Rowell for further recommendations and follow up as needed.   Patient can also follow up with podiatry as needed if he wishes.   Thanks

## 2024-07-31 NOTE — TELEPHONE ENCOUNTER
Spoke with patient regarding concerns. Reports increased swelling in right foot, worse after walking around or being on his feet for longer periods of time - like today after he finished playing golf. Resolves with elevation. Concerned this may be related to his PAD.   Denies any claudication, redness/discoloration, new wounds, or temperature changes.   Based on description of symptoms, likely related to vein disease.   Advised patient to try compression stockings and continue elevation. Encouraged patient to monitor symptoms and call if they worsen or fail to improve.   Will advise with Dr. Rowell for further recommendations and update patient as needed.  Patient states he has an upcoming appt with podiatry as well where he will bring up his concern.   Patient v/u to all of the above.

## 2024-11-20 ENCOUNTER — TELEPHONE (OUTPATIENT)
Dept: CARDIOLOGY CLINIC | Age: 74
End: 2024-11-20

## 2024-11-20 NOTE — TELEPHONE ENCOUNTER
Bhaskar called to make appt with Lelia as he saw his podiatrist and podiatry advised him to schedule with Lelia ASA due to blood flow issues in his veins. Bhaskar is scheduled 12/2/24.     Bhaskar's callback: [phone]

## 2024-11-21 NOTE — TELEPHONE ENCOUNTER
Called and spoke to patient, patient is having issues with his right foot, pt is experiencing redness, pain and swelling in toes. Podiatrist informed him he was bit by a spider and started him on Clindamycin 300 mg with little improvement. Patient had follow-up with podiatry who advised patient to see Dr. Rowell for non-healing wound.    Patient scheduled 11/26/24 at 10am

## 2024-11-21 NOTE — TELEPHONE ENCOUNTER
Can we call and assess patient?  If new wounds, discoloration, or claudication - may offer earlier appt with Dr. Rowell Tumarcelinoday 11/26 at 10 am.  Thanks

## 2024-11-25 NOTE — PROGRESS NOTES
SURGICAL PREPARATION OF WOUND BED LEFT FOOT, APPLICATION OF DERMAL GRAFT LEFT FOOT performed by Antonio Acuña DPM at [facility] OR    FOOT SURGERY Left 9/19/2022    OSTECTOMY FIFTH METATARSAL-LEFT-POPLITEAL BLOCK-LIZANDRO performed by Antonio Acuña DPM at [facility] OR    HAND SURGERY       No family history on file.  Social History     Tobacco Use    Smoking status: Never    Smokeless tobacco: Never   Vaping Use    Vaping status: Never Used   Substance Use Topics    Alcohol use: Not Currently    Drug use: Never       Allergies   Allergen Reactions    Penicillins Other (See Comments)     Unknown reaction; Patient states reaction occurred as a child but he cannot recall what reaction was. Patient tolerated cefepime as an inpatient on 11/21/22.     Ace Inhibitors      cough    Pcn [Penicillins]        Current Outpatient Medications   Medication Sig Dispense Refill    sulfamethoxazole-trimethoprim (BACTRIM DS;SEPTRA DS) 800-160 MG per tablet Take 1 tablet by mouth 2 times daily      glipiZIDE (GLUCOTROL XL) 10 MG extended release tablet Take 1 tablet by mouth daily      clopidogrel (PLAVIX) 75 MG tablet Take 1 tablet by mouth daily      Continuous Blood Gluc Sensor ([employer]YLE TOO 14 DAY SENSOR) MISC USE EVERY 14 DAYS FOR GLUCOSE MONITORING      aspirin 81 MG EC tablet Take 1 tablet by mouth daily      metFORMIN (GLUCOPHAGE) 500 MG tablet Take 1 tablet by mouth 2 times daily (with meals) 2 tabs in am and 1 tab po hs      losartan (COZAAR) 25 MG tablet Take 1 tablet by mouth daily      rosuvastatin (CRESTOR) 20 MG tablet Take 1 tablet by mouth daily      clindamycin (CLEOCIN) 300 MG capsule  (Patient not taking: Reported on 2/26/2024)       No current facility-administered medications for this visit.     Review of Systems:  Constitutional: No unanticipated weight loss. There's been no change in energy level, sleep pattern, or activity level. No fevers, chills.   Eyes: No visual changes or diplopia. No scleral

## 2024-11-26 ENCOUNTER — OFFICE VISIT (OUTPATIENT)
Dept: CARDIOLOGY CLINIC | Age: 74
End: 2024-11-26
Payer: MEDICARE

## 2024-11-26 VITALS
SYSTOLIC BLOOD PRESSURE: 126 MMHG | DIASTOLIC BLOOD PRESSURE: 70 MMHG | HEIGHT: 69 IN | OXYGEN SATURATION: 99 % | WEIGHT: 228 LBS | HEART RATE: 91 BPM | BODY MASS INDEX: 33.77 KG/M2

## 2024-11-26 DIAGNOSIS — I73.9 PAD (PERIPHERAL ARTERY DISEASE) (HCC): Primary | ICD-10-CM

## 2024-11-26 DIAGNOSIS — I73.9 PAD (PERIPHERAL ARTERY DISEASE) (HCC): ICD-10-CM

## 2024-11-26 PROCEDURE — G8417 CALC BMI ABV UP PARAM F/U: HCPCS | Performed by: INTERNAL MEDICINE

## 2024-11-26 PROCEDURE — 3017F COLORECTAL CA SCREEN DOC REV: CPT | Performed by: INTERNAL MEDICINE

## 2024-11-26 PROCEDURE — G8484 FLU IMMUNIZE NO ADMIN: HCPCS | Performed by: INTERNAL MEDICINE

## 2024-11-26 PROCEDURE — 1123F ACP DISCUSS/DSCN MKR DOCD: CPT | Performed by: INTERNAL MEDICINE

## 2024-11-26 PROCEDURE — 1036F TOBACCO NON-USER: CPT | Performed by: INTERNAL MEDICINE

## 2024-11-26 PROCEDURE — G8427 DOCREV CUR MEDS BY ELIG CLIN: HCPCS | Performed by: INTERNAL MEDICINE

## 2024-11-26 PROCEDURE — 1159F MED LIST DOCD IN RCRD: CPT | Performed by: INTERNAL MEDICINE

## 2024-11-26 PROCEDURE — 99214 OFFICE O/P EST MOD 30 MIN: CPT | Performed by: INTERNAL MEDICINE

## 2024-11-26 RX ORDER — SULFAMETHOXAZOLE AND TRIMETHOPRIM 800; 160 MG/1; MG/1
1 TABLET ORAL 2 TIMES DAILY
COMMUNITY
Start: 2024-11-20

## 2024-11-26 RX ORDER — TRAMADOL HYDROCHLORIDE 50 MG/1
50 TABLET ORAL EVERY 8 HOURS PRN
Qty: 15 TABLET | Refills: 0 | Status: SHIPPED | OUTPATIENT
Start: 2024-11-26 | End: 2024-12-01

## 2024-11-26 RX ORDER — GLIPIZIDE 10 MG/1
10 TABLET, FILM COATED, EXTENDED RELEASE ORAL DAILY
COMMUNITY
Start: 2024-08-22

## 2024-11-26 RX ORDER — TRAMADOL HYDROCHLORIDE 50 MG/1
50 TABLET ORAL EVERY 8 HOURS PRN
Qty: 15 TABLET | Refills: 0 | Status: SHIPPED | OUTPATIENT
Start: 2024-11-26 | End: 2024-11-26 | Stop reason: SDUPTHER

## 2024-11-26 RX ORDER — TRAMADOL HYDROCHLORIDE 50 MG/1
50 TABLET ORAL EVERY 8 HOURS PRN
Qty: 15 TABLET | Refills: 0 | Status: SHIPPED | OUTPATIENT
Start: 2024-11-26 | End: 2024-11-26

## 2024-11-27 ENCOUNTER — HOSPITAL ENCOUNTER (OUTPATIENT)
Dept: VASCULAR LAB | Age: 74
Discharge: HOME | End: 2024-11-29
Attending: INTERNAL MEDICINE
Payer: MEDICARE

## 2024-11-27 DIAGNOSIS — I73.9 PAD (PERIPHERAL ARTERY DISEASE) (HCC): ICD-10-CM

## 2024-11-27 LAB
VAS LEFT ARM BP: 178 MMHG
VAS LEFT DORSALIS PEDIS BP: 260 MMHG
VAS LEFT PTA BP: 260 MMHG
VAS RIGHT ARM BP: 182 MMHG
VAS RIGHT ATA MID PSV: 59.8 CM/S
VAS RIGHT CFA DIST PSV: 95.4 CM/S
VAS RIGHT CFA PROX PSV: 108.2 CM/S
VAS RIGHT DORSALIS PEDIS BP: 200 MMHG
VAS RIGHT PERONEAL MID PSV: 39.9 CM/S
VAS RIGHT PFA PROX PSV: 111 CM/S
VAS RIGHT POP A DIST PSV: 95.4 CM/S
VAS RIGHT POP A PROX PSV: 77.1 CM/S
VAS RIGHT POP A PROX VEL RATIO: 1.16
VAS RIGHT PTA BP: 260 MMHG
VAS RIGHT PTA MID PSV: 69.8 CM/S
VAS RIGHT SFA DIST PSV: 66.2 CM/S
VAS RIGHT SFA DIST VEL RATIO: 0.58
VAS RIGHT SFA MID PSV: 113.7 CM/S
VAS RIGHT SFA MID VEL RATIO: 1.5
VAS RIGHT SFA PROX PSV: 77.1 CM/S
VAS RIGHT SFA PROX VEL RATIO: 0.7

## 2024-11-27 PROCEDURE — 93926 LOWER EXTREMITY STUDY: CPT

## 2024-11-27 PROCEDURE — 93926 LOWER EXTREMITY STUDY: CPT | Performed by: INTERNAL MEDICINE

## 2024-11-29 ENCOUNTER — TELEPHONE (OUTPATIENT)
Dept: CARDIOLOGY CLINIC | Age: 74
End: 2024-11-29

## 2024-11-29 DIAGNOSIS — R68.89 ABNORMAL ANKLE BRACHIAL INDEX (ABI): ICD-10-CM

## 2024-11-29 DIAGNOSIS — I73.9 PAD (PERIPHERAL ARTERY DISEASE) (HCC): Primary | ICD-10-CM

## 2024-11-29 DIAGNOSIS — R79.9 ABNORMAL FINDING OF BLOOD CHEMISTRY, UNSPECIFIED: ICD-10-CM

## 2024-11-29 DIAGNOSIS — I73.9 BILATERAL CLAUDICATION OF LOWER LIMB (HCC): ICD-10-CM

## 2024-11-29 NOTE — TELEPHONE ENCOUNTER
Called Bhaskar and discussed recommendations per Dr. Rowell   He is in agreement for peripheral angiogram     Please schedule at [facility]      The morning of your procedure you will park in the hospital parking lot and report directly to the cath lab to check in.     Pre-Procedure Instructions   You will need to fast for at least 8 hours prior to procedure. No caffeine the morning of.   Hold all diabetic medications including, Metfomin.  If you take Lantus/Levemir only take ½ your normal dose the evening before.  All other medications can be taken in the morning with sips of water.   You will need to take both Plavix and Aspirin 81 mg the morning of.   Do not use any lotions, creams or perfume the morning of procedure.   Pre-procedure lab work will need to be completed 5-7 days prior to procedure.   Please have a responsible adult to drive you home after procedure. We advise you have someone to stay with you for 24 hours following procedure for precautionary measures. Depending on procedure you may require an overnight stay.   Cath lab will provide you with all post procedure instructions.     If you have any questions regarding the procedure itself or medications, please call [phone] and ask to speak with a nurse.

## 2024-11-29 NOTE — TELEPHONE ENCOUNTER
11/27/24 Vascular duplex scan final.     Dr Pelayo notes states:  Please notify patient that their arterial doppler is abnormal. Recommend peripheral angiogram.     Please advise.

## 2024-12-02 NOTE — TELEPHONE ENCOUNTER
Date of Procedure: Friday 12/6/24 @ [facility] with Dr. Rowell     Time of arrival: 7:00 am     Procedure time: 8:30 am     Spoke to Bhaskar and he is agreeable to date and time. Reviewed and emailed Bhaskar his procedure instructions and he verbalized understanding. Encouraged to call with any questions or concerns.       Published on Orate and e-mail to Ruth Ann.

## 2024-12-03 DIAGNOSIS — I73.9 PAD (PERIPHERAL ARTERY DISEASE) (HCC): ICD-10-CM

## 2024-12-03 DIAGNOSIS — R79.9 ABNORMAL FINDING OF BLOOD CHEMISTRY, UNSPECIFIED: ICD-10-CM

## 2024-12-04 LAB
ANION GAP SERPL CALCULATED.3IONS-SCNC: 13 MMOL/L (ref 3–16)
BASOPHILS # BLD: 0.1 K/UL (ref 0–0.2)
BASOPHILS NFR BLD: 0.9 %
BUN SERPL-MCNC: 23 MG/DL (ref 7–20)
CALCIUM SERPL-MCNC: 10.3 MG/DL (ref 8.3–10.6)
CHLORIDE SERPL-SCNC: 99 MMOL/L (ref 99–110)
CO2 SERPL-SCNC: 25 MMOL/L (ref 21–32)
CREAT SERPL-MCNC: 1.8 MG/DL (ref 0.8–1.3)
DEPRECATED RDW RBC AUTO: 13.9 % (ref 12.4–15.4)
EOSINOPHIL # BLD: 0.1 K/UL (ref 0–0.6)
EOSINOPHIL NFR BLD: 0.9 %
GFR SERPLBLD CREATININE-BSD FMLA CKD-EPI: 39 ML/MIN/{1.73_M2}
GLUCOSE SERPL-MCNC: 156 MG/DL (ref 70–99)
HCT VFR BLD AUTO: 42.5 % (ref 40.5–52.5)
HGB BLD-MCNC: 14.2 G/DL (ref 13.5–17.5)
LYMPHOCYTES # BLD: 1.1 K/UL (ref 1–5.1)
LYMPHOCYTES NFR BLD: 11.3 %
MCH RBC QN AUTO: 30.3 PG (ref 26–34)
MCHC RBC AUTO-ENTMCNC: 33.3 G/DL (ref 31–36)
MCV RBC AUTO: 90.8 FL (ref 80–100)
MONOCYTES # BLD: 0.8 K/UL (ref 0–1.3)
MONOCYTES NFR BLD: 8.2 %
NEUTROPHILS # BLD: 8 K/UL (ref 1.7–7.7)
NEUTROPHILS NFR BLD: 78.7 %
PLATELET # BLD AUTO: 300 K/UL (ref 135–450)
PMV BLD AUTO: 8.4 FL (ref 5–10.5)
POTASSIUM SERPL-SCNC: 5.4 MMOL/L (ref 3.5–5.1)
RBC # BLD AUTO: 4.69 M/UL (ref 4.2–5.9)
SODIUM SERPL-SCNC: 137 MMOL/L (ref 136–145)
WBC # BLD AUTO: 10.2 K/UL (ref 4–11)

## 2024-12-05 ENCOUNTER — TELEPHONE (OUTPATIENT)
Dept: CARDIOLOGY CLINIC | Age: 74
End: 2024-12-05

## 2024-12-05 NOTE — H&P
H&P Update - see note from 24    I have reviewed the history and physical and examined the patient and find no relevant changes.   I have reviewed with the patient and/or family the risks, benefits, and alternatives to the procedure.    Pre-sedation Assessment    Patient:  Bahskar Jean-Baptiste   :   1950  Intended Procedure: peripheral angiogram     There were no vitals filed for this visit.    Nursing notes reviewed and agreed.  Medications reviewed  Allergies:   Allergies   Allergen Reactions    Penicillins Other (See Comments)     Unknown reaction; Patient states reaction occurred as a child but he cannot recall what reaction was. Patient tolerated cefepime as an inpatient on 22.     Ace Inhibitors      cough    Pcn [Penicillins]          Pre-Procedure Assessment/Plan:  ASA 2 - Patient with mild systemic disease with no functional limitations    Mallampati Airway Assessment:  Mallampati Class I - (soft palate, fauces, uvula & anterior/posterior tonsillar pillars are visible)    Level of Sedation Plan:Mild sedation    Post Procedure plan: Return to same level of care    Michelet Rowell MD FACC  General, Interventional Cardiology, and Peripheral Vascular Disease   [facility]   (C): [phone]  (O): [phone]

## 2024-12-05 NOTE — TELEPHONE ENCOUNTER
Labs reviewed prior to procedure, noted elevated Cr 1.8  Per Dr. Rowell have patient come early for hydration, \"500cc bolus and then 150 cc/hr for 2 hours\"  Spoke to Leti RN in pre/post, states to have patient arrive at 0630. Called and LM for patient with my call back number [phone]. If he calls he can talk to me or Tasha.   UPDATE  Patient returned call and agreeable to come in early. Also instructed to hold Losartan, he verbalized understanding and will call for further questions or concerns.

## 2024-12-06 ENCOUNTER — HOSPITAL ENCOUNTER (OUTPATIENT)
Age: 74
Setting detail: OUTPATIENT SURGERY
Discharge: HOME | End: 2024-12-06
Attending: INTERNAL MEDICINE | Admitting: INTERNAL MEDICINE
Payer: MEDICARE

## 2024-12-06 VITALS
HEART RATE: 66 BPM | SYSTOLIC BLOOD PRESSURE: 120 MMHG | OXYGEN SATURATION: 100 % | DIASTOLIC BLOOD PRESSURE: 69 MMHG | RESPIRATION RATE: 15 BRPM | WEIGHT: 228 LBS | HEIGHT: 69 IN | TEMPERATURE: 98.4 F | BODY MASS INDEX: 33.77 KG/M2

## 2024-12-06 DIAGNOSIS — I73.9 PAD (PERIPHERAL ARTERY DISEASE) (HCC): ICD-10-CM

## 2024-12-06 LAB
ANION GAP SERPL CALCULATED.3IONS-SCNC: 10 MMOL/L (ref 3–16)
BUN SERPL-MCNC: 21 MG/DL (ref 7–20)
CALCIUM SERPL-MCNC: 9.5 MG/DL (ref 8.3–10.6)
CHLORIDE SERPL-SCNC: 97 MMOL/L (ref 99–110)
CO2 SERPL-SCNC: 26 MMOL/L (ref 21–32)
CREAT SERPL-MCNC: 1.7 MG/DL (ref 0.8–1.3)
ECHO BSA: 2.24 M2
GFR SERPLBLD CREATININE-BSD FMLA CKD-EPI: 42 ML/MIN/{1.73_M2}
GLUCOSE SERPL-MCNC: 123 MG/DL (ref 70–99)
POTASSIUM SERPL-SCNC: 4.1 MMOL/L (ref 3.5–5.1)
SODIUM SERPL-SCNC: 133 MMOL/L (ref 136–145)

## 2024-12-06 PROCEDURE — C1725 CATH, TRANSLUMIN NON-LASER: HCPCS | Performed by: INTERNAL MEDICINE

## 2024-12-06 PROCEDURE — 99152 MOD SED SAME PHYS/QHP 5/>YRS: CPT | Performed by: INTERNAL MEDICINE

## 2024-12-06 PROCEDURE — C1887 CATHETER, GUIDING: HCPCS | Performed by: INTERNAL MEDICINE

## 2024-12-06 PROCEDURE — C2623 CATH, TRANSLUMIN, DRUG-COAT: HCPCS | Performed by: INTERNAL MEDICINE

## 2024-12-06 PROCEDURE — C1760 CLOSURE DEV, VASC: HCPCS | Performed by: INTERNAL MEDICINE

## 2024-12-06 PROCEDURE — 2709999900 HC NON-CHARGEABLE SUPPLY: Performed by: INTERNAL MEDICINE

## 2024-12-06 PROCEDURE — 6360000004 HC RX CONTRAST MEDICATION: Performed by: INTERNAL MEDICINE

## 2024-12-06 PROCEDURE — 75710 ARTERY X-RAYS ARM/LEG: CPT | Performed by: INTERNAL MEDICINE

## 2024-12-06 PROCEDURE — 75774 ARTERY X-RAY EACH VESSEL: CPT | Performed by: INTERNAL MEDICINE

## 2024-12-06 PROCEDURE — 75625 CONTRAST EXAM ABDOMINL AORTA: CPT | Performed by: INTERNAL MEDICINE

## 2024-12-06 PROCEDURE — 80048 BASIC METABOLIC PNL TOTAL CA: CPT

## 2024-12-06 PROCEDURE — C1894 INTRO/SHEATH, NON-LASER: HCPCS | Performed by: INTERNAL MEDICINE

## 2024-12-06 PROCEDURE — 2580000003 HC RX 258: Performed by: INTERNAL MEDICINE

## 2024-12-06 PROCEDURE — 7100000010 HC PHASE II RECOVERY - FIRST 15 MIN: Performed by: INTERNAL MEDICINE

## 2024-12-06 PROCEDURE — 7100000011 HC PHASE II RECOVERY - ADDTL 15 MIN: Performed by: INTERNAL MEDICINE

## 2024-12-06 PROCEDURE — C1769 GUIDE WIRE: HCPCS | Performed by: INTERNAL MEDICINE

## 2024-12-06 PROCEDURE — 99153 MOD SED SAME PHYS/QHP EA: CPT | Performed by: INTERNAL MEDICINE

## 2024-12-06 PROCEDURE — 6360000002 HC RX W HCPCS: Performed by: INTERNAL MEDICINE

## 2024-12-06 PROCEDURE — C9764 REVASC INTRAVASC LITHOTRIPSY: HCPCS | Performed by: INTERNAL MEDICINE

## 2024-12-06 RX ORDER — ACETAMINOPHEN 325 MG/1
650 TABLET ORAL EVERY 4 HOURS PRN
Status: DISCONTINUED | OUTPATIENT
Start: 2024-12-06 | End: 2024-12-06 | Stop reason: HOSPADM

## 2024-12-06 RX ORDER — 0.9 % SODIUM CHLORIDE 0.9 %
500 INTRAVENOUS SOLUTION INTRAVENOUS ONCE
Status: COMPLETED | OUTPATIENT
Start: 2024-12-06 | End: 2024-12-06

## 2024-12-06 RX ORDER — ASPIRIN 325 MG
325 TABLET ORAL ONCE
Status: DISCONTINUED | OUTPATIENT
Start: 2024-12-06 | End: 2024-12-06 | Stop reason: HOSPADM

## 2024-12-06 RX ORDER — SODIUM CHLORIDE 9 MG/ML
INJECTION, SOLUTION INTRAVENOUS CONTINUOUS
Status: ACTIVE | OUTPATIENT
Start: 2024-12-06 | End: 2024-12-06

## 2024-12-06 RX ORDER — SODIUM CHLORIDE 0.9 % (FLUSH) 0.9 %
5-40 SYRINGE (ML) INJECTION PRN
Status: DISCONTINUED | OUTPATIENT
Start: 2024-12-06 | End: 2024-12-06 | Stop reason: HOSPADM

## 2024-12-06 RX ORDER — SODIUM CHLORIDE 9 MG/ML
INJECTION, SOLUTION INTRAVENOUS PRN
Status: DISCONTINUED | OUTPATIENT
Start: 2024-12-06 | End: 2024-12-06 | Stop reason: HOSPADM

## 2024-12-06 RX ORDER — IOPAMIDOL 755 MG/ML
INJECTION, SOLUTION INTRAVASCULAR PRN
Status: DISCONTINUED | OUTPATIENT
Start: 2024-12-06 | End: 2024-12-06 | Stop reason: HOSPADM

## 2024-12-06 RX ORDER — MIDAZOLAM HYDROCHLORIDE 1 MG/ML
INJECTION, SOLUTION INTRAMUSCULAR; INTRAVENOUS PRN
Status: DISCONTINUED | OUTPATIENT
Start: 2024-12-06 | End: 2024-12-06 | Stop reason: HOSPADM

## 2024-12-06 RX ORDER — SODIUM CHLORIDE 0.9 % (FLUSH) 0.9 %
5-40 SYRINGE (ML) INJECTION EVERY 12 HOURS SCHEDULED
Status: DISCONTINUED | OUTPATIENT
Start: 2024-12-06 | End: 2024-12-06 | Stop reason: HOSPADM

## 2024-12-06 RX ORDER — SODIUM CHLORIDE 9 MG/ML
INJECTION, SOLUTION INTRAVENOUS CONTINUOUS PRN
Status: COMPLETED | OUTPATIENT
Start: 2024-12-06 | End: 2024-12-06

## 2024-12-06 RX ORDER — FENTANYL CITRATE 50 UG/ML
INJECTION, SOLUTION INTRAMUSCULAR; INTRAVENOUS PRN
Status: DISCONTINUED | OUTPATIENT
Start: 2024-12-06 | End: 2024-12-06 | Stop reason: HOSPADM

## 2024-12-06 RX ORDER — HEPARIN SODIUM 1000 [USP'U]/ML
INJECTION, SOLUTION INTRAVENOUS; SUBCUTANEOUS PRN
Status: DISCONTINUED | OUTPATIENT
Start: 2024-12-06 | End: 2024-12-06 | Stop reason: HOSPADM

## 2024-12-06 RX ADMIN — Medication 10 ML: at 07:05

## 2024-12-06 RX ADMIN — SODIUM CHLORIDE: 9 INJECTION, SOLUTION INTRAVENOUS at 07:35

## 2024-12-06 RX ADMIN — SODIUM CHLORIDE 500 ML: 9 INJECTION, SOLUTION INTRAVENOUS at 07:05

## 2024-12-06 NOTE — PROGRESS NOTES
Discharge instructions reviewed with patient and his wife. Medications and follow-up appointment(s) reviewed.  All questions answered at present time. Patient verbalized understanding. Patient ambulated around unit and to BR (+void) without difficulty. Left groin cath site remains free from complication. PIV removed; dressing applied to site. Patient dressed and ready for discharge. Patient discharge off unit via wheelchair with belongings.     Electronically signed by Leti Carcamo RN on 12/6/2024 at 12:58 PM

## 2024-12-06 NOTE — DISCHARGE INSTRUCTIONS
PERIPHERAL ANGIOGRAM    Care of your puncture site:  Remove bandage 24 hours after the procedure.  May shower in 24 hours but do not sit in a bathtub/pool of water for 5 days or until the wound is healed.  Gently clean groin using soap and water.  Dry thoroughly and apply a Band-Aid that covers the entire site. Use Band-Aid until skin heals over in about 3-5 days.  Do not apply powder or lotion.      Normal Observations:  Soreness or tenderness which may last one week.  Possible bruising that could last 2 weeks.    Activity:  You may resume driving 24 hours following the procedure.  Do not make important / legal decisions within 24 hours after procedure.  You may resume normal activity in 5 days or after the wound heals.  Avoid lifting more than 10 pounds for 5 days or until the wound heals.  Avoid strenuous exercise or activity for 1 week.      Nutrition:  Regular diet  Drink at least 8 to 10 glasses of decaffeinated, non-alcoholic fluid for the next 24 hours to flush the x-ray dye used for your angiogram out of your body.    Call your doctor immediately if your condition worsens, for any other concerns, for a follow-up appointment or if you experience any of the following:  Increased swelling on the groin or leg.  Unusual pain, numbness, or tingling of the groin or down the leg.  Any signs of infection such as: redness, yellow drainage at the site, swelling or pain.    IF GROIN STARTS BLEEDING SIGNIFICANTLY:   LAY FLAT, HOLD FIRM DIRECT PRESSURE, AND CALL 911    Other Instructions:  Hold Metformin or Metformin containing drugs for 48 hours after procedure

## 2024-12-06 NOTE — BRIEF OP NOTE
Brief Postoperative Note      Patient: Bhaskar Jean-Baptiste  YOB: 1950  MRN: [id number]    Date of Procedure: 12/6/2024    Pre-Op Diagnosis Codes:      * PAD (peripheral artery disease) ([facility]) [I73.9]    Post-Op Diagnosis: Same       Procedure(s):  Angiography lower ext bilat  Angioplasty peripheral artery    Surgeon(s):  Michelet Rowell MD    Assistant:  * No surgical staff found *    Anesthesia: IV Sedation    Estimated Blood Loss (mL): less than 50     Complications: None      Findings:  S/p shockwave lithotripsy and DCB of mid right SFA lesion     Electronically signed by Michelet Rowell MD on 12/6/2024 at 9:36 AM

## 2024-12-06 NOTE — PROGRESS NOTES
Patient received post procedure in stable condition.   Post-cath handoff/site assessment performed to left groin   Pt is awake and alert. No distress noted. VSS-see flowsheet.   Post-cath restrictions/instructions provided  Patient provided with snack/drink.   No further needs at this time, call light within reach.   MD talking to patient and family at bedside.    Electronically signed by Leti Carcamo RN on 12/6/2024 at 10:04 AM

## 2024-12-15 LAB — ECHO BSA: 2.24 M2

## 2024-12-23 ENCOUNTER — TELEPHONE (OUTPATIENT)
Dept: CARDIOLOGY CLINIC | Age: 74
End: 2024-12-23

## 2024-12-23 NOTE — TELEPHONE ENCOUNTER
I spoke with Bhaskar - he reports swelling of his RLE since his procedure 12/15/24. He does not note any improvement with the swelling, and states it does feel somewhat \"hard.\" He denies any redness, discoloration, warm to touch, pain, or numbness or tingling.     Angiogram 12/15/24, \"Significant lesion in the right superficial femoral artery, status post successful shockwave lithotripsy and drug-coated balloon angioplasty\"     Patient is scheduled for a right foot third digit amputation on 1/6/25.

## 2024-12-23 NOTE — TELEPHONE ENCOUNTER
Bhaskar called me this afternoon stating that his right calf down to his foot/ ankle is still swollen.   He states that his calf feels hard, he is wanting to know if that is normal, he said his procedure was back on 12/6/24.    You can reach Bhaskar at #[phone]

## 2024-12-23 NOTE — TELEPHONE ENCOUNTER
Spoke with Dr. Treviño - patient scheduled to come to [facility] 12/24/24 at 8:30 a.m. for Dr. Medrano to assess RLE. Message sent to Dr. Medrano. Dr. Treviño will assess if he is unavailable. Patient v/u date/time/location.     TRESA Mon and MARIN Levin have been notified.

## 2025-01-06 ENCOUNTER — ANESTHESIA (OUTPATIENT)
Dept: OPERATING ROOM | Age: 75
End: 2025-01-06
Payer: MEDICARE

## 2025-01-06 ENCOUNTER — ANESTHESIA EVENT (OUTPATIENT)
Dept: OPERATING ROOM | Age: 75
End: 2025-01-06
Payer: MEDICARE

## 2025-01-06 ENCOUNTER — HOSPITAL ENCOUNTER (OUTPATIENT)
Age: 75
Setting detail: OUTPATIENT SURGERY
Discharge: HOME | End: 2025-01-06
Attending: PODIATRIST | Admitting: PODIATRIST
Payer: MEDICARE

## 2025-01-06 ENCOUNTER — APPOINTMENT (OUTPATIENT)
Dept: GENERAL RADIOLOGY | Age: 75
End: 2025-01-06
Attending: PODIATRIST
Payer: MEDICARE

## 2025-01-06 VITALS
SYSTOLIC BLOOD PRESSURE: 173 MMHG | RESPIRATION RATE: 17 BRPM | OXYGEN SATURATION: 100 % | HEART RATE: 82 BPM | HEIGHT: 69 IN | BODY MASS INDEX: 32.74 KG/M2 | DIASTOLIC BLOOD PRESSURE: 86 MMHG | TEMPERATURE: 97.8 F | WEIGHT: 221.06 LBS

## 2025-01-06 DIAGNOSIS — M86.171 OSTEOMYELITIS OF ANKLE OR FOOT, RIGHT, ACUTE: ICD-10-CM

## 2025-01-06 DIAGNOSIS — G89.18 POST-OP PAIN: Primary | ICD-10-CM

## 2025-01-06 LAB
GLUCOSE BLD-MCNC: 195 MG/DL (ref 70–99)
GLUCOSE BLD-MCNC: 218 MG/DL (ref 70–99)
PERFORMED ON: ABNORMAL
PERFORMED ON: ABNORMAL

## 2025-01-06 PROCEDURE — 7100000000 HC PACU RECOVERY - FIRST 15 MIN: Performed by: PODIATRIST

## 2025-01-06 PROCEDURE — 6360000002 HC RX W HCPCS: Performed by: PODIATRIST

## 2025-01-06 PROCEDURE — 88305 TISSUE EXAM BY PATHOLOGIST: CPT

## 2025-01-06 PROCEDURE — 6370000000 HC RX 637 (ALT 250 FOR IP): Performed by: ANESTHESIOLOGY

## 2025-01-06 PROCEDURE — 2500000003 HC RX 250 WO HCPCS: Performed by: PODIATRIST

## 2025-01-06 PROCEDURE — 6360000002 HC RX W HCPCS: Performed by: NURSE ANESTHETIST, CERTIFIED REGISTERED

## 2025-01-06 PROCEDURE — 2709999900 HC NON-CHARGEABLE SUPPLY: Performed by: PODIATRIST

## 2025-01-06 PROCEDURE — 2580000003 HC RX 258: Performed by: PODIATRIST

## 2025-01-06 PROCEDURE — 3600000012 HC SURGERY LEVEL 2 ADDTL 15MIN: Performed by: PODIATRIST

## 2025-01-06 PROCEDURE — 7100000011 HC PHASE II RECOVERY - ADDTL 15 MIN: Performed by: PODIATRIST

## 2025-01-06 PROCEDURE — 7100000001 HC PACU RECOVERY - ADDTL 15 MIN: Performed by: PODIATRIST

## 2025-01-06 PROCEDURE — 3700000001 HC ADD 15 MINUTES (ANESTHESIA): Performed by: PODIATRIST

## 2025-01-06 PROCEDURE — 7100000010 HC PHASE II RECOVERY - FIRST 15 MIN: Performed by: PODIATRIST

## 2025-01-06 PROCEDURE — 73630 X-RAY EXAM OF FOOT: CPT

## 2025-01-06 PROCEDURE — 3700000000 HC ANESTHESIA ATTENDED CARE: Performed by: PODIATRIST

## 2025-01-06 PROCEDURE — 3600000002 HC SURGERY LEVEL 2 BASE: Performed by: PODIATRIST

## 2025-01-06 RX ORDER — HYDROMORPHONE HYDROCHLORIDE 2 MG/ML
0.5 INJECTION, SOLUTION INTRAMUSCULAR; INTRAVENOUS; SUBCUTANEOUS EVERY 5 MIN PRN
Status: DISCONTINUED | OUTPATIENT
Start: 2025-01-06 | End: 2025-01-06 | Stop reason: HOSPADM

## 2025-01-06 RX ORDER — IPRATROPIUM BROMIDE AND ALBUTEROL SULFATE 2.5; .5 MG/3ML; MG/3ML
1 SOLUTION RESPIRATORY (INHALATION)
Status: DISCONTINUED | OUTPATIENT
Start: 2025-01-06 | End: 2025-01-06 | Stop reason: HOSPADM

## 2025-01-06 RX ORDER — SODIUM CHLORIDE 0.9 % (FLUSH) 0.9 %
5-40 SYRINGE (ML) INJECTION PRN
Status: DISCONTINUED | OUTPATIENT
Start: 2025-01-06 | End: 2025-01-06 | Stop reason: HOSPADM

## 2025-01-06 RX ORDER — OXYCODONE HYDROCHLORIDE 5 MG/1
5 TABLET ORAL
Status: DISCONTINUED | OUTPATIENT
Start: 2025-01-06 | End: 2025-01-06 | Stop reason: HOSPADM

## 2025-01-06 RX ORDER — SODIUM CHLORIDE 0.9 % (FLUSH) 0.9 %
5-40 SYRINGE (ML) INJECTION EVERY 12 HOURS SCHEDULED
Status: DISCONTINUED | OUTPATIENT
Start: 2025-01-06 | End: 2025-01-06 | Stop reason: HOSPADM

## 2025-01-06 RX ORDER — HYDROCODONE BITARTRATE AND ACETAMINOPHEN 5; 325 MG/1; MG/1
1 TABLET ORAL EVERY 4 HOURS PRN
Qty: 20 TABLET | Refills: 0 | OUTPATIENT
Start: 2025-01-06 | End: 2025-01-06 | Stop reason: HOSPADM

## 2025-01-06 RX ORDER — SODIUM CHLORIDE 9 MG/ML
INJECTION, SOLUTION INTRAVENOUS PRN
Status: DISCONTINUED | OUTPATIENT
Start: 2025-01-06 | End: 2025-01-06 | Stop reason: HOSPADM

## 2025-01-06 RX ORDER — PROPOFOL 10 MG/ML
INJECTION, EMULSION INTRAVENOUS
Status: DISCONTINUED | OUTPATIENT
Start: 2025-01-06 | End: 2025-01-06 | Stop reason: SDUPTHER

## 2025-01-06 RX ORDER — MAGNESIUM HYDROXIDE 1200 MG/15ML
LIQUID ORAL CONTINUOUS PRN
Status: COMPLETED | OUTPATIENT
Start: 2025-01-06 | End: 2025-01-06

## 2025-01-06 RX ORDER — LIDOCAINE HYDROCHLORIDE 10 MG/ML
INJECTION, SOLUTION EPIDURAL; INFILTRATION; INTRACAUDAL; PERINEURAL
Status: COMPLETED | OUTPATIENT
Start: 2025-01-06 | End: 2025-01-06

## 2025-01-06 RX ORDER — FENTANYL CITRATE 50 UG/ML
INJECTION, SOLUTION INTRAMUSCULAR; INTRAVENOUS
Status: DISCONTINUED | OUTPATIENT
Start: 2025-01-06 | End: 2025-01-06 | Stop reason: SDUPTHER

## 2025-01-06 RX ORDER — HYDROMORPHONE HYDROCHLORIDE 2 MG/ML
0.25 INJECTION, SOLUTION INTRAMUSCULAR; INTRAVENOUS; SUBCUTANEOUS EVERY 5 MIN PRN
Status: DISCONTINUED | OUTPATIENT
Start: 2025-01-06 | End: 2025-01-06 | Stop reason: HOSPADM

## 2025-01-06 RX ORDER — DEXAMETHASONE SODIUM PHOSPHATE 4 MG/ML
INJECTION, SOLUTION INTRA-ARTICULAR; INTRALESIONAL; INTRAMUSCULAR; INTRAVENOUS; SOFT TISSUE
Status: DISCONTINUED | OUTPATIENT
Start: 2025-01-06 | End: 2025-01-06 | Stop reason: SDUPTHER

## 2025-01-06 RX ORDER — SODIUM CHLORIDE 9 MG/ML
INJECTION, SOLUTION INTRAVENOUS CONTINUOUS
Status: DISCONTINUED | OUTPATIENT
Start: 2025-01-06 | End: 2025-01-06 | Stop reason: HOSPADM

## 2025-01-06 RX ORDER — ONDANSETRON 2 MG/ML
4 INJECTION INTRAMUSCULAR; INTRAVENOUS
Status: DISCONTINUED | OUTPATIENT
Start: 2025-01-06 | End: 2025-01-06 | Stop reason: HOSPADM

## 2025-01-06 RX ORDER — LIDOCAINE HYDROCHLORIDE 10 MG/ML
0.5 INJECTION, SOLUTION EPIDURAL; INFILTRATION; INTRACAUDAL; PERINEURAL ONCE
Status: DISCONTINUED | OUTPATIENT
Start: 2025-01-06 | End: 2025-01-06 | Stop reason: HOSPADM

## 2025-01-06 RX ORDER — ACETAMINOPHEN 500 MG
1000 TABLET ORAL ONCE
Status: COMPLETED | OUTPATIENT
Start: 2025-01-06 | End: 2025-01-06

## 2025-01-06 RX ORDER — PROCHLORPERAZINE EDISYLATE 5 MG/ML
5 INJECTION INTRAMUSCULAR; INTRAVENOUS
Status: DISCONTINUED | OUTPATIENT
Start: 2025-01-06 | End: 2025-01-06 | Stop reason: HOSPADM

## 2025-01-06 RX ORDER — LIDOCAINE HYDROCHLORIDE 20 MG/ML
INJECTION, SOLUTION INFILTRATION; PERINEURAL
Status: DISCONTINUED | OUTPATIENT
Start: 2025-01-06 | End: 2025-01-06 | Stop reason: SDUPTHER

## 2025-01-06 RX ORDER — OXYCODONE HYDROCHLORIDE 5 MG/1
10 TABLET ORAL PRN
Status: DISCONTINUED | OUTPATIENT
Start: 2025-01-06 | End: 2025-01-06 | Stop reason: HOSPADM

## 2025-01-06 RX ORDER — NALOXONE HYDROCHLORIDE 0.4 MG/ML
INJECTION, SOLUTION INTRAMUSCULAR; INTRAVENOUS; SUBCUTANEOUS PRN
Status: DISCONTINUED | OUTPATIENT
Start: 2025-01-06 | End: 2025-01-06 | Stop reason: HOSPADM

## 2025-01-06 RX ORDER — ONDANSETRON 2 MG/ML
INJECTION INTRAMUSCULAR; INTRAVENOUS
Status: DISCONTINUED | OUTPATIENT
Start: 2025-01-06 | End: 2025-01-06 | Stop reason: SDUPTHER

## 2025-01-06 RX ADMIN — CEFAZOLIN 2000 MG: 2 INJECTION, POWDER, FOR SOLUTION INTRAMUSCULAR; INTRAVENOUS at 12:18

## 2025-01-06 RX ADMIN — DEXAMETHASONE SODIUM PHOSPHATE 8 MG: 4 INJECTION, SOLUTION INTRAMUSCULAR; INTRAVENOUS at 12:03

## 2025-01-06 RX ADMIN — FENTANYL CITRATE 50 MCG: 50 INJECTION, SOLUTION INTRAMUSCULAR; INTRAVENOUS at 12:07

## 2025-01-06 RX ADMIN — ONDANSETRON 4 MG: 2 INJECTION INTRAMUSCULAR; INTRAVENOUS at 12:26

## 2025-01-06 RX ADMIN — LIDOCAINE HYDROCHLORIDE 100 MG: 20 INJECTION, SOLUTION INFILTRATION; PERINEURAL at 11:59

## 2025-01-06 RX ADMIN — ACETAMINOPHEN 1000 MG: 500 TABLET ORAL at 10:39

## 2025-01-06 RX ADMIN — PROPOFOL 120 MG: 10 INJECTION, EMULSION INTRAVENOUS at 11:59

## 2025-01-06 RX ADMIN — INSULIN HUMAN 5 UNITS: 100 INJECTION, SOLUTION PARENTERAL at 10:39

## 2025-01-06 RX ADMIN — SODIUM CHLORIDE: 9 INJECTION, SOLUTION INTRAVENOUS at 10:29

## 2025-01-06 RX ADMIN — FENTANYL CITRATE 50 MCG: 50 INJECTION, SOLUTION INTRAMUSCULAR; INTRAVENOUS at 12:11

## 2025-01-06 ASSESSMENT — PAIN DESCRIPTION - ORIENTATION: ORIENTATION: RIGHT

## 2025-01-06 ASSESSMENT — PAIN - FUNCTIONAL ASSESSMENT
PAIN_FUNCTIONAL_ASSESSMENT: WONG-BAKER FACES
PAIN_FUNCTIONAL_ASSESSMENT: NONE - DENIES PAIN
PAIN_FUNCTIONAL_ASSESSMENT: 0-10
PAIN_FUNCTIONAL_ASSESSMENT: 0-10
PAIN_FUNCTIONAL_ASSESSMENT: NONE - DENIES PAIN

## 2025-01-06 ASSESSMENT — PAIN DESCRIPTION - DESCRIPTORS
DESCRIPTORS: SHARP;STABBING
DESCRIPTORS: SHARP;STABBING;SHOOTING

## 2025-01-06 ASSESSMENT — PAIN SCALES - GENERAL: PAINLEVEL_OUTOF10: 5

## 2025-01-06 ASSESSMENT — PAIN DESCRIPTION - LOCATION: LOCATION: TOE (COMMENT WHICH ONE)

## 2025-01-06 NOTE — H&P
Date of Surgery Update:    Bhaskar Jean-Baptiste  was seen, history and physical examination reviewed, and patient examined by me today. There have been no significant clinical changes since the completion of the previous history and physical.     The nature of the procedure, possible complications, alternative forms of therapy, post-op course, and post-op goals have been explained to patient (or appropriate guardian) and patient's family and understanding was verbalized.  All questions have been answered. The consent has been signed.  Patient's surgical site has been marked. Patient wishes to proceed with surgery.     Discussed with TAH Moraes DPM  Podiatric Resident PGY-3  Pager [phone] or Perfect Serve

## 2025-01-06 NOTE — DISCHARGE INSTRUCTIONS
[facility]   [address]  [place], [place] [id number] [phone]    Dr. Antonio Acuña                                             Post Operative Instructions    1.  Have Prescriptions filled and take as directed.  All medications should be taken with food or milk.    2.  Keep foot elevated six inches above the level of the heart.  Support feet, legs, and knees with pillows.    3.  KEEP FOOT ELEVATED AS MUCH AS POSSIBLE UNTIL YOUR NEXT VISIT    4.  DO NOT APPLY ICE. Ice will decrease blood flow    5.  Keep dressing clean, dry, and intact.  DO NOT REMOVE DRESSING.  CALL THE OFFICE IF BANDAGE COMES OFF.    6.  For the first 7 days after surgery, take temperature by mouth three times a day.  Call the office if greater than 101F.    7.  Weightbearing as tolerated in surgical shoe    8.  All instructions are to be followed until otherwise instructed by your surgeon.    9.  Call our office if you have any concerns or questions which arise.  Our phones are answered 24 hours a day.  [phone]    10.  Your first post-operative appointment is scheduled, call the office for appointment date and time if not known prior to today.      ORTHOPEDIC/PODIATRY DISCHARGE INSTRUCTIONS    Follow your surgeons instructions.  Make follow-up appointment.  Observe operative area for signs of excessive bleeding such as a slow general ooze that saturates the dressing or bright red bleeding. In either case, apply pressure to the area and elevate if possible and call your surgeon right away.  Observe the affected extremity for circulation or nerve impairment such as a change in color, numbness, tingling, coldness or increased pain. If any of these symptoms are present call your surgeon.  Observe operative site for any signs of infection such as increased pain, redness, fever greater than 101 degrees, swelling, foul odor or drainage. Contact surgeon if any of these symptoms are present.  If

## 2025-01-06 NOTE — BRIEF OP NOTE
Brief Postoperative Note      Patient: Bhaskar Jean-Baptiste  YOB: 1950  MRN: [id number]    Date of Procedure: 1/6/2025    Pre-Op Diagnosis Codes:      * Osteomyelitis of ankle or foot, right, acute [M86.171]    Post-Op Diagnosis: Same       Procedure(s):  30834 - RIGHT FOOT THIRD DIGIT AMPUTATION    Surgeon(s):  Antonio Acuña DPM    Assistant:  Resident: Giovanny Duke PGY-3    Anesthesia: General    Hemostasis: anatomic dissection    Injectables: Pre-Op 10 cc of 1% Lidocaine plain and Post-Op 20 cc of 0.5 % Marcaine plain    Materials: 3-0/4-0 Nylon    Estimated Blood Loss (mL): less than 50     Complications: None    Specimens:   ID Type Source Tests Collected by Time Destination   A : A: RIGHT FOOT, THIRD DIGIT Tissue Tissue SURGICAL PATHOLOGY Antonio Acuña DPM 1/6/2025 1213        Implants:  * No implants in log *      Drains: * No LDAs found *    Findings:  Infection Present At Time Of Surgery (PATOS) (choose all levels that have infection present):  - Organ Space infection (below fascia) present as evidenced by osteomyelitis  Other Findings: Third proximal phalanx was noted to be discolored, soft, and crumbly consistent with osteomyelitis.  Third metatarsal head was noted to be white, hard, and shiny consistent with healthy bone.  No purulence expressed.  Procedure felt definitive in terms of closure and resection of osteomyelitis.  Adequate bleeding noted.    Giovanny Duke DPM  Chief Podiatric Resident PGY-3  Pager [phone] or Perfect Serve

## 2025-01-06 NOTE — ANESTHESIA PRE PROCEDURE
Department of Anesthesiology  Preprocedure Note       Name:  Bhaskar Jean-Baptiste   Age:  74 y.o.  :  1950                                          MRN:  [id number]         Date:  2025      Surgeon: Surgeon(s):  Antonio Acuña DPM    Procedure: Procedure(s):  RIGHT FOOT THIRD DIGIT AMPUTATION - POPLITEAL BLOCK; SAPHENOUS    Medications prior to admission:   Prior to Admission medications    Medication Sig Start Date End Date Taking? Authorizing Provider   sulfamethoxazole-trimethoprim (BACTRIM DS;SEPTRA DS) 800-160 MG per tablet Take 1 tablet by mouth 2 times daily 24   Rubin Contreras MD   glipiZIDE (GLUCOTROL XL) 10 MG extended release tablet Take 1 tablet by mouth daily 24   Rubin Contreras MD   clopidogrel (PLAVIX) 75 MG tablet Take 1 tablet by mouth daily    Rubin Contreras MD   Continuous Blood Gluc Sensor ([employer] TOO 14 DAY SENSOR) MISC USE EVERY 14 DAYS FOR GLUCOSE MONITORING 22   Rubin Contreras MD   aspirin 81 MG EC tablet Take 1 tablet by mouth daily    Rubin Contreras MD   metFORMIN (GLUCOPHAGE) 500 MG tablet Take 1 tablet by mouth 2 times daily (with meals) 2 tabs in am and 1 tab po hs    Rubin Contreras MD   losartan (COZAAR) 25 MG tablet Take 1 tablet by mouth daily    Rubin Contreras MD   rosuvastatin (CRESTOR) 20 MG tablet Take 1 tablet by mouth daily    Rubin Contreras MD       Current medications:    Current Facility-Administered Medications   Medication Dose Route Frequency Provider Last Rate Last Admin   • ceFAZolin (ANCEF) 2,000 mg in sterile water 20 mL IV syringe  2,000 mg IntraVENous On Call to OR Antonio Acuña DPM       • 0.9 % sodium chloride infusion   IntraVENous Continuous Antonio Acuña DPM       • lidocaine PF 1 % injection 0.5 mL  0.5 mL IntraDERmal Once Antonio Acuña DPM           Allergies:    Allergies   Allergen Reactions   • Penicillins Other (See Comments)     Unknown

## 2025-01-06 NOTE — ANESTHESIA POSTPROCEDURE EVALUATION
Department of Anesthesiology  Postprocedure Note    Patient: Bhaskar Jean-Baptiste  MRN: [id number]  YOB: 1950  Date of evaluation: 1/6/2025    Procedure Summary       Date: 01/06/25 Room / Location: [address]    Anesthesia Start: 1154 Anesthesia Stop: 1242    Procedure: RIGHT FOOT THIRD DIGIT AMPUTATION (Right: Third Toe) Diagnosis:       Osteomyelitis of ankle or foot, right, acute      (Osteomyelitis of ankle or foot, right, acute [M86.171])    Surgeons: Antonio Acuña DPM Responsible Provider: Christopher Gonzalez MD    Anesthesia Type: general ASA Status: 3            Anesthesia Type: No value filed.    Kranthi Phase I: Kranthi Score: 6    Kranthi Phase II:      Anesthesia Post Evaluation    Patient location during evaluation: PACU  Patient participation: complete - patient participated  Level of consciousness: awake and alert  Airway patency: patent  Nausea & Vomiting: no nausea and no vomiting  Cardiovascular status: hemodynamically stable  Respiratory status: acceptable  Hydration status: euvolemic  Pain management: adequate    No notable events documented.

## 2025-01-07 NOTE — OP NOTE
Operative Note      Patient: Bhaskar Jean-Baptiste  YOB: 1950  MRN: [id number]    Date of Procedure: 1/6/2025    Pre-Op Diagnosis Codes:      * Osteomyelitis of ankle or foot, right, acute [M86.171]    Post-Op Diagnosis: Same       Procedure(s):  24558 - RIGHT FOOT THIRD DIGIT AMPUTATION     Surgeon(s):  Antonio Acuña DPM     Assistant:  Resident: Giovanny Duke, PGY-3     Anesthesia: General     Hemostasis: anatomic dissection     Injectables: Pre-Op 10 cc of 1% Lidocaine plain and Post-Op 20 cc of 0.5 % Marcaine plain     Materials: 3-0/4-0 Nylon     Estimated Blood Loss (mL): less than 50     Complications: None    Specimens:   ID Type Source Tests Collected by Time Destination   A : A: RIGHT FOOT, THIRD DIGIT Tissue Tissue SURGICAL PATHOLOGY Antonio Acuña DPM 1/6/2025 1213        Implants:  * No implants in log *      Drains: * No LDAs found *    Findings:  Infection Present At Time Of Surgery (PATOS) (choose all levels that have infection present):  - Organ Space infection (below fascia) present as evidenced by osteomyelitis  Other Findings: Third proximal phalanx was noted to be discolored, soft, and crumbly consistent with osteomyelitis. Third metatarsal head was noted to be white, hard, and shiny consistent with healthy bone. No purulence expressed. Procedure felt definitive in terms of closure and resection of osteomyelitis. Adequate bleeding noted.     INDICATIONS FOR PROCEDURE: This patient has signs and symptoms clinically  consistent with the above mentioned preoperative diagnosis. Having failed conservative treatment, it was determined that the patient would benefit from surgical intervention. All potential risks, benefits, and complications were discussed with the patient prior to the scheduling of surgery. All the patient's questions were answered and no guarantees were given. The patient wished to proceed with surgery, and informed written consent was obtained.

## 2025-01-16 NOTE — PROGRESS NOTES
peripheral arterial disease.    Arterial doppler 11/27/24    Right side findings: Resting SARAVANAN is non-compressible (SARAVANAN >1.4).    Severe calcific plaque noted throughout the right lower extremity. Cannot rule out isolated stenosis.    Monophasic flow noted within the right dorsalis pedis artery.    Left side findings: Resting SARAVANAN is non-compressible (SARAVANAN >1.4).    Peripheral 12/6/24    Significant lesion in the right superficial femoral artery, status post successful shockwave lithotripsy and drug-coated balloon angioplasty    Aggressive medical therapy peripheral arterial disease    Supervised walking program    Repeat SARAVANAN in the office in 4 to 6 weeks    Procedure(s) 1/6/25  41905 - RIGHT FOOT THIRD DIGIT AMPUTATION    All above diagnostic testing and laboratory data was independently visualized and reviewed by me (not simply review of report)       Assessment and Plan   1) PAD  -s/p I&D with Dr. Acuña 1/13/23  -s/p atherectomy and balloon angioplasty of the left SFA, left popliteal artery, left posterior tibial artery 1/2023  -s/p successful shockwave lithotripsy and drug-coated balloon angioplasty of significant lesion right SFA 12/6/24  -s/p amputation r3 1/6/25  -following with Dr. Acuña for wound care  -denies new wounds or worsening claudication  -right DP/PT strong doppler signal present on today's visit   -aggressive medical therapy including aspirin, Plavix, Crestor 20 mg daily  -1+ RLE edema, encouraged continued compression, elevation, and ambulation/activity as tolerable    2) Coronary artery disease  -likely  on angiogram with no stent placement and started on antiplatelet therapy  -asymptomatic   -continue ASA and statin     3) Essential hypertension  -controlled  -continue medical therapy    4) Hyperlipidemia  -continue Crestor 20 mg daily   -LDL 59 (10/2024)    5) Diabetes type II   -management per PCP  -following with Podiatry, Dr. Acuña   -continue ASA and statin     6) Chronic back

## 2025-01-17 ENCOUNTER — OFFICE VISIT (OUTPATIENT)
Dept: CARDIOLOGY CLINIC | Age: 75
End: 2025-01-17
Payer: MEDICARE

## 2025-01-17 VITALS
DIASTOLIC BLOOD PRESSURE: 78 MMHG | BODY MASS INDEX: 32.44 KG/M2 | WEIGHT: 219 LBS | OXYGEN SATURATION: 100 % | HEIGHT: 69 IN | HEART RATE: 104 BPM | SYSTOLIC BLOOD PRESSURE: 128 MMHG

## 2025-01-17 DIAGNOSIS — I73.9 PAD (PERIPHERAL ARTERY DISEASE) (HCC): Primary | ICD-10-CM

## 2025-01-17 PROCEDURE — 1036F TOBACCO NON-USER: CPT | Performed by: INTERNAL MEDICINE

## 2025-01-17 PROCEDURE — 1159F MED LIST DOCD IN RCRD: CPT | Performed by: INTERNAL MEDICINE

## 2025-01-17 PROCEDURE — G8427 DOCREV CUR MEDS BY ELIG CLIN: HCPCS | Performed by: INTERNAL MEDICINE

## 2025-01-17 PROCEDURE — 1123F ACP DISCUSS/DSCN MKR DOCD: CPT | Performed by: INTERNAL MEDICINE

## 2025-01-17 PROCEDURE — G8417 CALC BMI ABV UP PARAM F/U: HCPCS | Performed by: INTERNAL MEDICINE

## 2025-01-17 PROCEDURE — 99213 OFFICE O/P EST LOW 20 MIN: CPT | Performed by: INTERNAL MEDICINE

## 2025-01-17 PROCEDURE — 3017F COLORECTAL CA SCREEN DOC REV: CPT | Performed by: INTERNAL MEDICINE

## 2025-01-29 ENCOUNTER — HOSPITAL ENCOUNTER (INPATIENT)
Age: 75
LOS: 7 days | Discharge: HOME HEALTH | DRG: 857 | End: 2025-02-05
Attending: EMERGENCY MEDICINE | Admitting: INTERNAL MEDICINE
Payer: MEDICARE

## 2025-01-29 ENCOUNTER — APPOINTMENT (OUTPATIENT)
Dept: GENERAL RADIOLOGY | Age: 75
DRG: 857 | End: 2025-01-29
Payer: MEDICARE

## 2025-01-29 DIAGNOSIS — I73.9 PAD (PERIPHERAL ARTERY DISEASE) (HCC): Primary | ICD-10-CM

## 2025-01-29 DIAGNOSIS — E11.621 TYPE 2 DIABETES MELLITUS WITH LEFT DIABETIC FOOT ULCER (HCC): ICD-10-CM

## 2025-01-29 DIAGNOSIS — L08.9 WOUND INFECTION: ICD-10-CM

## 2025-01-29 DIAGNOSIS — I25.10 CORONARY ARTERY DISEASE INVOLVING NATIVE CORONARY ARTERY OF NATIVE HEART WITHOUT ANGINA PECTORIS: ICD-10-CM

## 2025-01-29 DIAGNOSIS — L97.529 TYPE 2 DIABETES MELLITUS WITH LEFT DIABETIC FOOT ULCER (HCC): ICD-10-CM

## 2025-01-29 DIAGNOSIS — L08.9 DIABETIC FOOT INFECTION (HCC): ICD-10-CM

## 2025-01-29 DIAGNOSIS — T14.8XXA WOUND INFECTION: ICD-10-CM

## 2025-01-29 DIAGNOSIS — E11.628 DIABETIC FOOT INFECTION (HCC): ICD-10-CM

## 2025-01-29 DIAGNOSIS — I25.811 CORONARY ARTERY DISEASE INVOLVING TRANSPLANTED HEART, UNSPECIFIED VESSEL OR LESION TYPE, UNSPECIFIED WHETHER ANGINA PRESENT: ICD-10-CM

## 2025-01-29 LAB
ANION GAP SERPL CALCULATED.3IONS-SCNC: 14 MMOL/L (ref 3–16)
BASOPHILS # BLD: 0.1 K/UL (ref 0–0.2)
BASOPHILS NFR BLD: 0.6 %
BUN SERPL-MCNC: 19 MG/DL (ref 7–20)
CALCIUM SERPL-MCNC: 9.7 MG/DL (ref 8.3–10.6)
CHLORIDE SERPL-SCNC: 98 MMOL/L (ref 99–110)
CO2 SERPL-SCNC: 23 MMOL/L (ref 21–32)
CREAT SERPL-MCNC: 1.5 MG/DL (ref 0.8–1.3)
CRP SERPL-MCNC: 114 MG/L (ref 0–5.1)
DEPRECATED RDW RBC AUTO: 14 % (ref 12.4–15.4)
EOSINOPHIL # BLD: 0.1 K/UL (ref 0–0.6)
EOSINOPHIL NFR BLD: 1 %
ERYTHROCYTE [SEDIMENTATION RATE] IN BLOOD BY WESTERGREN METHOD: 78 MM/HR (ref 0–20)
GFR SERPLBLD CREATININE-BSD FMLA CKD-EPI: 49 ML/MIN/{1.73_M2}
GLUCOSE BLD-MCNC: 161 MG/DL (ref 70–99)
GLUCOSE SERPL-MCNC: 144 MG/DL (ref 70–99)
HCT VFR BLD AUTO: 34.8 % (ref 40.5–52.5)
HGB BLD-MCNC: 11.7 G/DL (ref 13.5–17.5)
LYMPHOCYTES # BLD: 0.9 K/UL (ref 1–5.1)
LYMPHOCYTES NFR BLD: 6.6 %
MCH RBC QN AUTO: 29.5 PG (ref 26–34)
MCHC RBC AUTO-ENTMCNC: 33.5 G/DL (ref 31–36)
MCV RBC AUTO: 87.9 FL (ref 80–100)
MONOCYTES # BLD: 1.3 K/UL (ref 0–1.3)
MONOCYTES NFR BLD: 9.7 %
NEUTROPHILS # BLD: 11.1 K/UL (ref 1.7–7.7)
NEUTROPHILS NFR BLD: 82.1 %
PERFORMED ON: ABNORMAL
PLATELET # BLD AUTO: 321 K/UL (ref 135–450)
PMV BLD AUTO: 8.1 FL (ref 5–10.5)
POTASSIUM SERPL-SCNC: 4.1 MMOL/L (ref 3.5–5.1)
RBC # BLD AUTO: 3.96 M/UL (ref 4.2–5.9)
SODIUM SERPL-SCNC: 135 MMOL/L (ref 136–145)
WBC # BLD AUTO: 13.5 K/UL (ref 4–11)

## 2025-01-29 PROCEDURE — 73630 X-RAY EXAM OF FOOT: CPT

## 2025-01-29 PROCEDURE — 85025 COMPLETE CBC W/AUTO DIFF WBC: CPT

## 2025-01-29 PROCEDURE — 6370000000 HC RX 637 (ALT 250 FOR IP)

## 2025-01-29 PROCEDURE — 1200000000 HC SEMI PRIVATE

## 2025-01-29 PROCEDURE — 80048 BASIC METABOLIC PNL TOTAL CA: CPT

## 2025-01-29 PROCEDURE — 99285 EMERGENCY DEPT VISIT HI MDM: CPT

## 2025-01-29 PROCEDURE — 86140 C-REACTIVE PROTEIN: CPT

## 2025-01-29 PROCEDURE — 2580000003 HC RX 258: Performed by: EMERGENCY MEDICINE

## 2025-01-29 PROCEDURE — 85652 RBC SED RATE AUTOMATED: CPT

## 2025-01-29 PROCEDURE — 2500000003 HC RX 250 WO HCPCS: Performed by: INTERNAL MEDICINE

## 2025-01-29 PROCEDURE — 6360000002 HC RX W HCPCS: Performed by: EMERGENCY MEDICINE

## 2025-01-29 RX ORDER — ACETAMINOPHEN 325 MG/1
650 TABLET ORAL EVERY 6 HOURS PRN
Status: DISCONTINUED | OUTPATIENT
Start: 2025-01-29 | End: 2025-02-05 | Stop reason: HOSPADM

## 2025-01-29 RX ORDER — HYDROCODONE BITARTRATE AND ACETAMINOPHEN 5; 325 MG/1; MG/1
1 TABLET ORAL ONCE
Status: COMPLETED | OUTPATIENT
Start: 2025-01-29 | End: 2025-01-29

## 2025-01-29 RX ORDER — SODIUM CHLORIDE 0.9 % (FLUSH) 0.9 %
5-40 SYRINGE (ML) INJECTION PRN
Status: DISCONTINUED | OUTPATIENT
Start: 2025-01-29 | End: 2025-02-05 | Stop reason: HOSPADM

## 2025-01-29 RX ORDER — LOSARTAN POTASSIUM 25 MG/1
25 TABLET ORAL DAILY
Status: DISCONTINUED | OUTPATIENT
Start: 2025-01-30 | End: 2025-02-05

## 2025-01-29 RX ORDER — SODIUM CHLORIDE 9 MG/ML
INJECTION, SOLUTION INTRAVENOUS PRN
Status: DISCONTINUED | OUTPATIENT
Start: 2025-01-29 | End: 2025-02-05 | Stop reason: HOSPADM

## 2025-01-29 RX ORDER — ONDANSETRON 4 MG/1
4 TABLET, ORALLY DISINTEGRATING ORAL EVERY 8 HOURS PRN
Status: DISCONTINUED | OUTPATIENT
Start: 2025-01-29 | End: 2025-02-05 | Stop reason: HOSPADM

## 2025-01-29 RX ORDER — INSULIN LISPRO 100 [IU]/ML
0-4 INJECTION, SOLUTION INTRAVENOUS; SUBCUTANEOUS
Status: DISCONTINUED | OUTPATIENT
Start: 2025-01-29 | End: 2025-02-05 | Stop reason: HOSPADM

## 2025-01-29 RX ORDER — POLYETHYLENE GLYCOL 3350 17 G/17G
17 POWDER, FOR SOLUTION ORAL DAILY PRN
Status: DISCONTINUED | OUTPATIENT
Start: 2025-01-29 | End: 2025-02-05 | Stop reason: HOSPADM

## 2025-01-29 RX ORDER — SODIUM CHLORIDE 0.9 % (FLUSH) 0.9 %
5-40 SYRINGE (ML) INJECTION EVERY 12 HOURS SCHEDULED
Status: DISCONTINUED | OUTPATIENT
Start: 2025-01-29 | End: 2025-02-05 | Stop reason: HOSPADM

## 2025-01-29 RX ORDER — DEXTROSE MONOHYDRATE 100 MG/ML
INJECTION, SOLUTION INTRAVENOUS CONTINUOUS PRN
Status: DISCONTINUED | OUTPATIENT
Start: 2025-01-29 | End: 2025-02-05 | Stop reason: HOSPADM

## 2025-01-29 RX ORDER — ROSUVASTATIN CALCIUM 20 MG/1
20 TABLET, COATED ORAL NIGHTLY
Status: DISCONTINUED | OUTPATIENT
Start: 2025-01-30 | End: 2025-02-05 | Stop reason: HOSPADM

## 2025-01-29 RX ORDER — CLOPIDOGREL BISULFATE 75 MG/1
75 TABLET ORAL DAILY
Status: DISCONTINUED | OUTPATIENT
Start: 2025-01-30 | End: 2025-02-03

## 2025-01-29 RX ORDER — ASPIRIN 81 MG/1
81 TABLET ORAL DAILY
Status: DISCONTINUED | OUTPATIENT
Start: 2025-01-30 | End: 2025-02-03

## 2025-01-29 RX ORDER — VANCOMYCIN 1.5 G/300ML
1500 INJECTION, SOLUTION INTRAVENOUS EVERY 24 HOURS
Status: DISCONTINUED | OUTPATIENT
Start: 2025-01-30 | End: 2025-02-05 | Stop reason: HOSPADM

## 2025-01-29 RX ORDER — GLUCAGON 1 MG/ML
1 KIT INJECTION PRN
Status: DISCONTINUED | OUTPATIENT
Start: 2025-01-29 | End: 2025-02-05 | Stop reason: HOSPADM

## 2025-01-29 RX ORDER — ACETAMINOPHEN 650 MG/1
650 SUPPOSITORY RECTAL EVERY 6 HOURS PRN
Status: DISCONTINUED | OUTPATIENT
Start: 2025-01-29 | End: 2025-02-05 | Stop reason: HOSPADM

## 2025-01-29 RX ORDER — ONDANSETRON 2 MG/ML
4 INJECTION INTRAMUSCULAR; INTRAVENOUS EVERY 6 HOURS PRN
Status: DISCONTINUED | OUTPATIENT
Start: 2025-01-29 | End: 2025-02-05 | Stop reason: HOSPADM

## 2025-01-29 RX ADMIN — SODIUM CHLORIDE, PRESERVATIVE FREE 10 ML: 5 INJECTION INTRAVENOUS at 22:34

## 2025-01-29 RX ADMIN — VANCOMYCIN HYDROCHLORIDE 2500 MG: 1.25 INJECTION, POWDER, LYOPHILIZED, FOR SOLUTION INTRAVENOUS at 18:34

## 2025-01-29 RX ADMIN — HYDROCODONE BITARTRATE AND ACETAMINOPHEN 1 TABLET: 5; 325 TABLET ORAL at 22:54

## 2025-01-29 RX ADMIN — PIPERACILLIN SODIUM AND TAZOBACTAM SODIUM 4500 MG: 4; .5 INJECTION, SOLUTION INTRAVENOUS at 18:01

## 2025-01-29 ASSESSMENT — PAIN SCALES - GENERAL
PAINLEVEL_OUTOF10: 6
PAINLEVEL_OUTOF10: 6

## 2025-01-29 ASSESSMENT — PAIN DESCRIPTION - ORIENTATION: ORIENTATION: RIGHT

## 2025-01-29 ASSESSMENT — PAIN DESCRIPTION - LOCATION: LOCATION: FOOT

## 2025-01-29 NOTE — ED NOTES
Patient Name: Bhaskar Jean-Baptiste  : 1950 74 y.o.  MRN: [id number]  ED Room #: A08/A08-08     Chief complaint:   Chief Complaint   Patient presents with    Circulatory Problem     Patient was sent from Dr. Rowell's office due to not having any blood flow to his right foot.     Hospital Problem/Diagnosis:       O2 Flow Rate:    (if applicable)  Cardiac Rhythm:   (if applicable)  Active LDA's:   Peripheral IV 25 Right Antecubital (Active)   Site Assessment Clean, dry & intact 25 1627   Line Status Blood return noted 25 1627            How does patient ambulate? Stand by assist    2. How does patient take pills? Unknown, no oral medications were given in the Emergency Department    3. Is patient alert? Alert    4. Is patient oriented? To Person, To Place, To Time, To Situation, and Follows Commands    5.   Patient arrived from:  home  Facility Name: ___________________________________________    6. If patient is disoriented or from a Skill Nursing Facility has family been notified of admission? No    7. Patient belongings? Belongings: Clothing. Cell phone    Disposition of belongings? Kept with Patient     8. Any specific patient or family belongings/needs/dynamics?   a. no    9. Miscellaneous comments/pending orders?  a. 20g IV R forearm      If there are any additional questions please reach out to the Emergency Department.      Akila García, RN  [phone]

## 2025-01-29 NOTE — CONSULTS
[facility] - Clinical Pharmacy Note    Vancomycin - Management by Pharmacy    Consult Date: 1/29/25  Consulting Provider: João Clemons MD     A vancomycin loading dose of 2500 mg x1 (~ 25 mg/kg) has been ordered for the patient.     If vancomycin is desired to continue on admission, a new consult must be placed for pharmacy to continue dosing.     Thank you for consulting pharmacy,    Cameron Siu PharmD  Main Pharmacy: [id number]  1/29/2025 5:15 PM

## 2025-01-29 NOTE — CONSULTS
negative.      PHYSICAL EXAM:      Vitals:    Pulse 85   Temp 97.9 °F (36.6 °C) (Oral)   Resp 19   SpO2 100%     LABS:   Recent Labs     01/29/25  1601   WBC 13.5*   HGB 11.7*   HCT 34.8*        Recent Labs     01/29/25  1601   *   K 4.1   CL 98*   CO2 23   BUN 19   CREATININE 1.5*     No results for input(s): \"INR\", \"APTT\" in the last 72 hours.    Invalid input(s): \"PROT\"      GENERAL: Patient is alert and oriented x3. No signs of acute distress noted.    LOWER EXTREMITY EXAMINATION:  VASCULAR: DP and PT pulses are non-palpable 0/4 right LE however are palpable 2 out of 4 to left lower extremity.  Upon Doppler examination DP and PT pulses to the right lower extremity were found to be monophasic.  CFT is brisk to the digits of the foot b/l. Skin temperature is warm to cool from proximal to distal with no focal calor.  +2 pitting edema to right lower extremity however no lower extremity edema noted to left lower extremity. No pain with calf compression b/l.    NEUROLOGIC: Gross and epicritic sensation is diminished b/l. Protective sensation is diminished at all pedal sites b/l.    DERMATOLOGIC:   Patient provided verbal consent for photos to be obtained today, 01/29/25    Right lower extremity:  Full-thickness surgical incision overlying the site of previous third digit amputation.  Sutures remain intact without any signs of gapping or dehiscence.  Periwound maceration is appreciated.  Area of debrided hyperkeratotic tissue subsecond metatarsal head.  No purulent drainage, tracking, tunneling, crepitus, fluctuance, or acute signs of infection noted.    Left lower extremity: is a closed soft tissue envelope without ulceration or acute signs of infection.    MUSCULOSKELETAL: Muscle strength is 5/5 for all pedal groups tested. No pain with palpation of the foot or ankle b/l. Ankle joint ROM is decreased in dorsiflexion with the knee extended. No obvious biomechanical abnormalities.  History of right

## 2025-01-29 NOTE — ED PROVIDER NOTES
THE [facility]  EMERGENCY DEPARTMENT ENCOUNTER          NURSE PRACTITIONER NOTE       Date of evaluation: 1/29/2025    Chief Complaint     Circulatory Problem (Patient was sent from Dr. Rowell's office due to not having any blood flow to his right foot.)      History of Present Illness     Bhaskar Jean-Baptiste is a 74 y.o. male with a past medical history of CAD, CKD, peripheral arterial disease, hypertension, hyperlipidemia, MI, diabetes; who presents to the emergency department with a complaint of a circulatory problem in his right foot.  Patient reports that he saw his podiatrist today, and they were unable to get a DP pulse on their exam using a Doppler; sent him in here for further evaluation.  Patient has a history of third toe amputation on this foot due to infection.  States otherwise he is felt well.    He has a past medical history of CAD (coronary artery disease), CKD (chronic kidney disease) stage 3, GFR 30-59 ml/min ([facility]), Diabetes mellitus ([facility]), DM (diabetes mellitus) ([facility]), HLD (hyperlipidemia), HTN (hypertension), Hyperlipidemia, Hypertension, Ischemic stroke ([facility]), and MI (myocardial infarction) ([facility]).    ASSESSMENT / PLAN  (MEDICAL DECISION MAKING)     INITIAL VITALS:  , Temp: 97.9 °F (36.6 °C), Pulse: 85, Respirations: 19, SpO2: 100 %     Bhaskar Jean-Baptiste is a 74 y.o. male who reports that he was sent in from his podiatrist office with concerns for circulatory problem in his right foot.  Patient has known peripheral arterial disease with a history of diabetic foot infections status post third toe amputation.  On presentation patient is hemodynamically stable, afebrile overall well-appearing.  He does have an erythematous right foot, is unable to fully bend his toes, and his foot is warm.  We are able to hear a DP pulse as well as PT pulse with Doppler.  Podiatry resident was consulted, please see consult note.    Laboratory evaluation significant for an ESR of 78, ,

## 2025-01-29 NOTE — H&P
V2.0  History and Physical      Name:  Bhaskar Jean-Baptiste /Age/Sex: 1950  (74 y.o. male)   MRN & CSN:  [id number] & [id number] Encounter Date/Time: 2025 5:38 PM EST   [facility] PCP: Hilda Mir MD       Hospital Day: 1    Assessment and Plan:   Bhaskar Jean-Baptiste is a 74 y.o. male with a pmh of peripheral arterial disease, DM-2, CAD, hyperlipidemia, essential hypertension, CKD stage III, CVA who presents with Wound infection of his right foot with increased swelling and nonpalpable pulses    Hospital Problems             Last Modified POA    * (Principal) Wound infection 2025 Yes   #Right foot wound infection with suspected osteomyelitis  -Elevated inflammatory markers (CRP = 114, ESR = 78)  -Sent by his podiatrist recommendations  -Status post right third toe amputation on 2025  -Outpatient antibiotics for 1 week  -Pharmacy consult for vancomycin dosing  -Continue on Zosyn Q8 hourly  -Partial weightbearing on the right  -Pain relief as needed  -No Lovenox for DVT prophylaxis and SCDs not used given PAD and nonpalpable pulses  -Infectious disease consult    #History of PAD and nonpalpable pulses at the outpatient podiatry appointment  -History of revascularization procedure on 2024  -Per ED physician posterior tibial on the right is easily dopplerable  -Neurovascular checks every 4 hourly  -Vascular duplex lower extremity arteries bilateral  -Cardiology consulted by podiatry    #DM-2  -Hold glipizide and metformin  -Start on low-dose SSI with hypoglycemia protocol  -Follow A1c    #CAD/CVA  -Hold aspirin and Plavix  -Continue on statins    #Essential hypertension  -Continue on losartan home doses    #CKD stage III  -Renal function stable at baseline    #Chronic anemia  -H&H stable at baseline    Disposition:   Current Living situation: Home  Expected Disposition: Home  Estimated D/C: 3 days    Diet ADULT DIET; Regular; 4 carb choices (60 gm/meal); Low Fat/Low  Take 1 tablet by mouth daily 8/22/24   Rubin Contreras MD   clopidogrel (PLAVIX) 75 MG tablet Take 1 tablet by mouth daily    Rubin Contreras MD   Continuous Blood Gluc Sensor (FREESTYLE TOO 14 DAY SENSOR) [facility] USE EVERY 14 DAYS FOR GLUCOSE MONITORING 12/20/22   Rubin Contreras MD   aspirin 81 MG EC tablet Take 1 tablet by mouth daily    Rubin Contreras MD   metFORMIN (GLUCOPHAGE) 500 MG tablet Take 1 tablet by mouth 2 times daily (with meals) 2 tabs in am and 1 tab po hs    Rubin Contreras MD   losartan (COZAAR) 25 MG tablet Take 1 tablet by mouth daily    Rubin Contreras MD   rosuvastatin (CRESTOR) 20 MG tablet Take 1 tablet by mouth daily    Rubin Contreras MD       Physical Exam:        General: NAD, afebrile  Eyes: EOMI  ENT: neck supple  Cardiovascular: Regular rhythm and rate.  Respiratory: Clear to auscultation  Gastrointestinal: Soft, non tender  Genitourinary: no suprapubic tenderness  Musculoskeletal: No edema.  Right foot wrapped in surgical/Ace dressing.  Absent posterior tibialis pulses per ED report, but dopplerable.  Unable to palpate as patient's right foot is wrapped in surgical dressing  Skin: warm, dry  Neuro: Alert.  Awake.  Oriented x 4  Psych: Mood appropriate.       Past Medical History:   PMHx   Past Medical History:   Diagnosis Date    CAD (coronary artery disease)     CKD (chronic kidney disease) stage 3, GFR 30-59 ml/min (HCC)     Diabetes mellitus (HCC)     DM (diabetes mellitus) ([facility])     HLD (hyperlipidemia)     HTN (hypertension)     Hyperlipidemia     Hypertension     Ischemic stroke (HCC)     MI (myocardial infarction) ([facility])      PSHX:  has a past surgical history that includes Hand surgery (Left); Foot surgery (Left, 09/19/2022); Ankle surgery (Left, 09/19/2022); Foot Debridement (Left, 11/22/2022); Foot Debridement (Left, 01/13/2023); invasive vascular (N/A, 12/06/2024); invasive vascular (N/A, 12/06/2024); Colonoscopy; hernia repair;

## 2025-01-29 NOTE — ED PROVIDER NOTES
ED Attending Attestation Note     Date of evaluation: 1/29/2025    This patient was seen by the advance practice provider.  I have seen and examined the patient, agree with the workup, evaluation, management and diagnosis. The care plan has been discussed.  My assessment reveals patient the presents to the ED as he was referred in by his podiatrist.  He went to visit his podiatrist for routine follow-up.  Referred here because of reported lack of pulse in his foot.  On my exam foot is warm.  Does not cause him significant pain other than his second toe which has fusiform swelling erythema with some drainage.  He has erythema slight on the dorsum and plantar surface.  He has a posterior tibial which is easily dopplerable.  Doppler was used to obtain a PT pulse.  His white count was 13,500 with elevated sed rates and CRPs.  X-ray suspicious of osteo and toes.  Patient started on Zosyn and vancomycin and will be followed by podiatry while an inpatient     João Clemons MD  01/29/25 0617       João Clemons MD  01/29/25 7847

## 2025-01-30 ENCOUNTER — APPOINTMENT (OUTPATIENT)
Dept: VASCULAR LAB | Age: 75
DRG: 857 | End: 2025-01-30
Payer: MEDICARE

## 2025-01-30 ENCOUNTER — APPOINTMENT (OUTPATIENT)
Dept: MRI IMAGING | Age: 75
DRG: 857 | End: 2025-01-30
Payer: MEDICARE

## 2025-01-30 ENCOUNTER — APPOINTMENT (OUTPATIENT)
Dept: GENERAL RADIOLOGY | Age: 75
DRG: 857 | End: 2025-01-30
Payer: MEDICARE

## 2025-01-30 PROBLEM — Z01.810 PREOPERATIVE CARDIOVASCULAR EXAMINATION: Status: ACTIVE | Noted: 2025-01-30

## 2025-01-30 LAB
ABO + RH BLD: NORMAL
ANION GAP SERPL CALCULATED.3IONS-SCNC: 10 MMOL/L (ref 3–16)
BASOPHILS # BLD: 0 K/UL (ref 0–0.2)
BASOPHILS NFR BLD: 0.4 %
BLD GP AB SCN SERPL QL: NORMAL
BUN SERPL-MCNC: 15 MG/DL (ref 7–20)
CALCIUM SERPL-MCNC: 9.7 MG/DL (ref 8.3–10.6)
CHLORIDE SERPL-SCNC: 102 MMOL/L (ref 99–110)
CO2 SERPL-SCNC: 27 MMOL/L (ref 21–32)
CREAT SERPL-MCNC: 1.5 MG/DL (ref 0.8–1.3)
DEPRECATED RDW RBC AUTO: 14.1 % (ref 12.4–15.4)
EOSINOPHIL # BLD: 0.2 K/UL (ref 0–0.6)
EOSINOPHIL NFR BLD: 1.7 %
EST. AVERAGE GLUCOSE BLD GHB EST-MCNC: 142.7 MG/DL
GFR SERPLBLD CREATININE-BSD FMLA CKD-EPI: 49 ML/MIN/{1.73_M2}
GLUCOSE BLD-MCNC: 106 MG/DL (ref 70–99)
GLUCOSE BLD-MCNC: 169 MG/DL (ref 70–99)
GLUCOSE BLD-MCNC: 174 MG/DL (ref 70–99)
GLUCOSE BLD-MCNC: 196 MG/DL (ref 70–99)
GLUCOSE SERPL-MCNC: 149 MG/DL (ref 70–99)
HBA1C MFR BLD: 6.6 %
HCT VFR BLD AUTO: 35.4 % (ref 40.5–52.5)
HGB BLD-MCNC: 11.6 G/DL (ref 13.5–17.5)
INR PPP: 1.2 (ref 0.85–1.15)
LYMPHOCYTES # BLD: 0.9 K/UL (ref 1–5.1)
LYMPHOCYTES NFR BLD: 8 %
MCH RBC QN AUTO: 29 PG (ref 26–34)
MCHC RBC AUTO-ENTMCNC: 32.9 G/DL (ref 31–36)
MCV RBC AUTO: 88.2 FL (ref 80–100)
MONOCYTES # BLD: 1.3 K/UL (ref 0–1.3)
MONOCYTES NFR BLD: 10.8 %
NEUTROPHILS # BLD: 9.2 K/UL (ref 1.7–7.7)
NEUTROPHILS NFR BLD: 79.1 %
PERFORMED ON: ABNORMAL
PLATELET # BLD AUTO: 296 K/UL (ref 135–450)
PMV BLD AUTO: 7.9 FL (ref 5–10.5)
POTASSIUM SERPL-SCNC: 4.5 MMOL/L (ref 3.5–5.1)
PROTHROMBIN TIME: 15.4 SEC (ref 11.9–14.9)
RBC # BLD AUTO: 4.01 M/UL (ref 4.2–5.9)
SODIUM SERPL-SCNC: 139 MMOL/L (ref 136–145)
VAS LEFT ARM BP: 140 MMHG
VAS LEFT ATA DIST PSV: 61.5 CM/S
VAS LEFT CFA DIST PSV: 98.8 CM/S
VAS LEFT CFA PROX PSV: 97.2 CM/S
VAS LEFT DORSALIS PEDIS BP: 255 MMHG
VAS LEFT PERONEAL MID PSV: 53.1 CM/S
VAS LEFT PFA PROX PSV: 80.1 CM/S
VAS LEFT POP A DIST PSV: 80.1 CM/S
VAS LEFT POP A PROX PSV: 61.5 CM/S
VAS LEFT POP A PROX VEL RATIO: 0.49
VAS LEFT PTA BP: 255 MMHG
VAS LEFT PTA DIST PSV: 67.8 CM/S
VAS LEFT PTA MID PSV: 45.9 CM/S
VAS LEFT SFA DIST PSV: 125 CM/S
VAS LEFT SFA DIST VEL RATIO: 0.25
VAS LEFT SFA MID PSV: 502 CM/S
VAS LEFT SFA MID VEL RATIO: 6.75
VAS LEFT SFA PROX PSV: 74.4 CM/S
VAS LEFT SFA PROX VEL RATIO: 0.75
VAS RIGHT ATA DIST PSV: 123 CM/S
VAS RIGHT CFA DIST PSV: 89.6 CM/S
VAS RIGHT CFA PROX PSV: 84.8 CM/S
VAS RIGHT DORSALIS PEDIS BP: 255 MMHG
VAS RIGHT PERONEAL MID PSV: 101 CM/S
VAS RIGHT PFA PROX PSV: 79.3 CM/S
VAS RIGHT POP A DIST PSV: 145 CM/S
VAS RIGHT POP A PROX PSV: 86.9 CM/S
VAS RIGHT POP A PROX VEL RATIO: 0.99
VAS RIGHT PTA BP: 255 MMHG
VAS RIGHT PTA DIST PSV: 121 CM/S
VAS RIGHT PTA MID PSV: 113 CM/S
VAS RIGHT SFA DIST PSV: 88.1 CM/S
VAS RIGHT SFA DIST VEL RATIO: 0.61
VAS RIGHT SFA MID PSV: 144 CM/S
VAS RIGHT SFA MID VEL RATIO: 2.1
VAS RIGHT SFA PROX PSV: 69.8 CM/S
VAS RIGHT SFA PROX VEL RATIO: 0.8
WBC # BLD AUTO: 11.6 K/UL (ref 4–11)

## 2025-01-30 PROCEDURE — 86900 BLOOD TYPING SEROLOGIC ABO: CPT

## 2025-01-30 PROCEDURE — 6360000004 HC RX CONTRAST MEDICATION

## 2025-01-30 PROCEDURE — 6360000002 HC RX W HCPCS: Performed by: INTERNAL MEDICINE

## 2025-01-30 PROCEDURE — 6370000000 HC RX 637 (ALT 250 FOR IP): Performed by: INTERNAL MEDICINE

## 2025-01-30 PROCEDURE — 85610 PROTHROMBIN TIME: CPT

## 2025-01-30 PROCEDURE — 86850 RBC ANTIBODY SCREEN: CPT

## 2025-01-30 PROCEDURE — 80048 BASIC METABOLIC PNL TOTAL CA: CPT

## 2025-01-30 PROCEDURE — 73720 MRI LWR EXTREMITY W/O&W/DYE: CPT

## 2025-01-30 PROCEDURE — 71045 X-RAY EXAM CHEST 1 VIEW: CPT

## 2025-01-30 PROCEDURE — 6370000000 HC RX 637 (ALT 250 FOR IP): Performed by: NURSE PRACTITIONER

## 2025-01-30 PROCEDURE — 86901 BLOOD TYPING SEROLOGIC RH(D): CPT

## 2025-01-30 PROCEDURE — 2580000003 HC RX 258: Performed by: INTERNAL MEDICINE

## 2025-01-30 PROCEDURE — A9576 INJ PROHANCE MULTIPACK: HCPCS

## 2025-01-30 PROCEDURE — 93925 LOWER EXTREMITY STUDY: CPT | Performed by: SURGERY

## 2025-01-30 PROCEDURE — 93005 ELECTROCARDIOGRAM TRACING: CPT

## 2025-01-30 PROCEDURE — 99223 1ST HOSP IP/OBS HIGH 75: CPT | Performed by: INTERNAL MEDICINE

## 2025-01-30 PROCEDURE — 1200000000 HC SEMI PRIVATE

## 2025-01-30 PROCEDURE — 85025 COMPLETE CBC W/AUTO DIFF WBC: CPT

## 2025-01-30 PROCEDURE — 36415 COLL VENOUS BLD VENIPUNCTURE: CPT

## 2025-01-30 PROCEDURE — 83036 HEMOGLOBIN GLYCOSYLATED A1C: CPT

## 2025-01-30 PROCEDURE — 93925 LOWER EXTREMITY STUDY: CPT

## 2025-01-30 RX ORDER — CALCIUM CARBONATE 500 MG/1
500 TABLET, CHEWABLE ORAL 3 TIMES DAILY PRN
Status: DISCONTINUED | OUTPATIENT
Start: 2025-01-30 | End: 2025-02-05 | Stop reason: HOSPADM

## 2025-01-30 RX ADMIN — ROSUVASTATIN CALCIUM 20 MG: 20 TABLET, FILM COATED ORAL at 21:21

## 2025-01-30 RX ADMIN — VANCOMYCIN 1500 MG: 1.5 INJECTION, SOLUTION INTRAVENOUS at 16:25

## 2025-01-30 RX ADMIN — PIPERACILLIN AND TAZOBACTAM 3375 MG: 3; .375 INJECTION, POWDER, LYOPHILIZED, FOR SOLUTION INTRAVENOUS at 18:11

## 2025-01-30 RX ADMIN — GADOTERIDOL 20 ML: 279.3 INJECTION, SOLUTION INTRAVENOUS at 16:09

## 2025-01-30 RX ADMIN — LOSARTAN POTASSIUM 25 MG: 50 TABLET, FILM COATED ORAL at 09:10

## 2025-01-30 RX ADMIN — ANTACID TABLETS 500 MG: 500 TABLET, CHEWABLE ORAL at 11:34

## 2025-01-30 RX ADMIN — ACETAMINOPHEN 650 MG: 325 TABLET ORAL at 16:17

## 2025-01-30 RX ADMIN — SODIUM CHLORIDE: 9 INJECTION, SOLUTION INTRAVENOUS at 16:21

## 2025-01-30 RX ADMIN — PIPERACILLIN AND TAZOBACTAM 3375 MG: 3; .375 INJECTION, POWDER, LYOPHILIZED, FOR SOLUTION INTRAVENOUS at 02:05

## 2025-01-30 RX ADMIN — PIPERACILLIN AND TAZOBACTAM 3375 MG: 3; .375 INJECTION, POWDER, LYOPHILIZED, FOR SOLUTION INTRAVENOUS at 09:10

## 2025-01-30 ASSESSMENT — PAIN DESCRIPTION - ONSET
ONSET: ON-GOING
ONSET: ON-GOING

## 2025-01-30 ASSESSMENT — PAIN DESCRIPTION - ORIENTATION
ORIENTATION: RIGHT
ORIENTATION: RIGHT

## 2025-01-30 ASSESSMENT — PAIN DESCRIPTION - PAIN TYPE
TYPE: CHRONIC PAIN
TYPE: ACUTE PAIN

## 2025-01-30 ASSESSMENT — PAIN DESCRIPTION - DIRECTION: RADIATING_TOWARDS: NO

## 2025-01-30 ASSESSMENT — PAIN - FUNCTIONAL ASSESSMENT
PAIN_FUNCTIONAL_ASSESSMENT: PREVENTS OR INTERFERES SOME ACTIVE ACTIVITIES AND ADLS
PAIN_FUNCTIONAL_ASSESSMENT: PREVENTS OR INTERFERES SOME ACTIVE ACTIVITIES AND ADLS

## 2025-01-30 ASSESSMENT — PAIN DESCRIPTION - FREQUENCY
FREQUENCY: CONTINUOUS
FREQUENCY: CONTINUOUS

## 2025-01-30 ASSESSMENT — PAIN SCALES - GENERAL
PAINLEVEL_OUTOF10: 3
PAINLEVEL_OUTOF10: 0
PAINLEVEL_OUTOF10: 4
PAINLEVEL_OUTOF10: 3

## 2025-01-30 ASSESSMENT — PAIN DESCRIPTION - LOCATION
LOCATION: FOOT
LOCATION: FOOT

## 2025-01-30 ASSESSMENT — PAIN DESCRIPTION - DESCRIPTORS
DESCRIPTORS: ACHING
DESCRIPTORS: ACHING

## 2025-01-30 NOTE — PLAN OF CARE
Problem: Pain  Goal: Verbalizes/displays adequate comfort level or baseline comfort level  1/30/2025 1659 by Estefany Hinton, RN  Outcome: Progressing  Note: Pt reported pain level of a 3 on a scale of 1-10 located in R foot. Pt medicated w/ prn tylenol. Pt reported relief.    Problem: Safety - Adult  Goal: Free from fall injury  1/30/2025 1659 by Estefany Hinton, RN  Note: Patient free from falls/ injury during duration of shift. Bed alarm on, call light w/in reach, bed in lowest position, non-skid socks on and fall sign posted outside door.

## 2025-01-30 NOTE — PLAN OF CARE
Podiatric Surgery Plan of Care Note  Bhaskar CAMRYN Jean-Baptiste      Plan for podiatric surgical intervention consisting of right foot incision and drainage with bone biopsy tomorrow morning.  Discussed surgical intervention with patient at bedside. All potential risks, benefits, and complications were discussed with the patient prior to the scheduling of surgery. No guarantees or promises were made to what the outcomes will be. The patient wished to proceed with surgery, and informed written consent was obtained, signed, and placed on the patient's chart.   -NPO at midnight   -Will hold morning anticoagulation    -Preop labs ordered   -Would appreciate medical clearance for OR       Discussed with Dr. Antonio Acuña DPM.     Abdirahman Sharp DPM   Podiatric Resident PGY2  [employer]  Pager number [phone]  1/30/2025, 5:41 PM

## 2025-01-30 NOTE — CONSULTS
Infectious Diseases   Consult Note      Reason for Consult: Right foot infection with suspected osteomyelitis  Requesting Physician: Dr. Ramos  Date of Admission: 1/29/2025  Subjective:   CHIEF COMPLAINT: Sent from podiatrist office for nonpalpable pulse    HPI:    74-year-old male with history of CAD, CKD, DM 2, HLD, HTN that presented on 1/29 with complaints of right foot swelling, non palpable pulses and pain.  He was seen in his podiatrist/Dr. Acuña's office today and was sent for evaluation in the ER.  Patient had recent revascularization on 12/6 and underwent right third toe amputation 1/6 with Dr. Acuña.  The patient was following up as an out patient and was found to have nonpalpable pulses and swelling in the right leg and so was sent for evaluation.  Patient has been on oral antibiotics for the past week.  Upon presentation, WBC was 13.5 and he was afebrile.  He was empirically started on vancomycin and pip-tazo.  ESR/CRP was 78/114 respectively.  A x-ray of the right foot showed osseous erosive changes along the second and third metatarsal head suspicious for OM and forefoot soft tissue gas and swelling.  Right's and left-sided ABIs were noncompressible and greater than 1.4.  Vascular has been consulted.                     Current abx: Vancomycin, pip-tazo         Past Surgical History:       Diagnosis Date    CAD (coronary artery disease)     CKD (chronic kidney disease) stage 3, GFR 30-59 ml/min ([facility])     Diabetes mellitus (HCC)     DM (diabetes mellitus) ([facility])     HLD (hyperlipidemia)     HTN (hypertension)     Hyperlipidemia     Hypertension     Ischemic stroke (HCC)     MI (myocardial infarction) ([facility])          Procedure Laterality Date    ANKLE SURGERY Left 09/19/2022    REPAIR PERONEUS BREVIS-LEFT FOOT performed by Antonio Acuña DPM at [facility] OR    COLONOSCOPY      FOOT DEBRIDEMENT Left 11/22/2022    LEFT FOOT DEBRIDEMENT INCISION AND DRAINAGE WITH 5TH METATARSAL OSTECTOMY AND DELAYED

## 2025-01-30 NOTE — PROGRESS NOTES
Pre-Operative Note  Resident Note     Patient: Bhaskar Jean-Baptiste     Procedure: Right foot incision and drainage with bone biopsy    Clearance for surgery: Yes, per Internal Medicine    Consent: Procedure was discussed at length with patient and all questions were answered to completion, consent obtained and placed in chart     Labs:        Recent Labs     01/29/25  1601 01/30/25  0828   WBC 13.5* 11.6*   HGB 11.7* 11.6*   HCT 34.8* 35.4*   MCV 87.9 88.2    296        Recent Labs     01/29/25  1601 01/30/25  0828   * 139   K 4.1 4.5   CL 98* 102   CO2 23 27   BUN 19 15   CREATININE 1.5* 1.5*      No results for input(s): \"AST\", \"ALT\", \"BILIDIR\", \"BILITOT\", \"ALKPHOS\" in the last 72 hours.    Invalid input(s): \"ALB\"   No results for input(s): \"LIPASE\", \"AMYLASE\" in the last 72 hours.     Recent Labs     01/30/25  1904   INR 1.20*      No results for input(s): \"CKTOTAL\", \"CKMB\", \"CKMBINDEX\", \"TROPONINI\" in the last 72 hours.    Pre-op Medications:   Current Facility-Administered Medications   Medication Dose Route Frequency Provider Last Rate Last Admin    calcium carbonate (TUMS) chewable tablet 500 mg  500 mg Oral TID PRN Myranda Shay APRN - CNP   500 mg at 01/30/25 1134    [Held by provider] aspirin EC tablet 81 mg  81 mg Oral Daily Nabeel Alvarado MD        [Held by provider] clopidogrel (PLAVIX) tablet 75 mg  75 mg Oral Daily Nabeel Alvarado MD        losartan (COZAAR) tablet 25 mg  25 mg Oral Daily Nabeel Alvarado MD   25 mg at 01/30/25 0910    rosuvastatin (CRESTOR) tablet 20 mg  20 mg Oral Nightly Nabeel Alvarado MD   20 mg at 01/30/25 2121    glucose chewable tablet 16 g  4 tablet Oral PRN Nabeel Alvarado MD        dextrose bolus 10% 125 mL  125 mL IntraVENous PRN Nabeel Alvarado MD        Or    dextrose bolus 10% 250 mL  250 mL IntraVENous PRN Nabeel Alvarado MD        glucagon injection 1 mg  1 mg SubCUTAneous PRN Christiano,

## 2025-01-30 NOTE — CONSULTS
[facility] -  Clinical Pharmacy Note    Vancomycin - Management by Pharmacy    Consult Date(s): 01/29/2025  Consulting Provider(s): Dr. Nabeel Alvarado    Assessment / Plan  Right foot wound infection with suspected osteomyelitis- Vancomycin  Concurrent Antimicrobials: Zosyn  Day of Vanc Therapy / Ordered Duration: Day 1 of TBD  Current Dosing Method: Bayesian-Guided AUC Dosing  Therapeutic Goal: -600 mg/L*hr  Current Dose / Plan:   Vancomycin 2500 mg (~ 25 mg/kg) IV x1  Will follow with Vancomycin 1500 mg IV Q24H  Estimated AUCss ~ 542 mg/L*hr and troughss ~ 15.4 mg/L  Will continue to monitor clinical condition and make adjustments to regimen as appropriate.    Thank you for consulting pharmacy,      Rita Castano, PharmD  [id number] (Main Pharmacy)        Interval update:  therapy initiation Status post right third toe amputation on 1/6/2025     Subjective/Objective:   Bhaskar Jean-Baptiste is a 74 y.o. male with a PMHx significant for peripheral arterial disease, DM-2, CAD, hyperlipidemia, essential hypertension, CKD stage III, CVA who is admitted with wound infection of his right foot with increased swelling and nonpalpable pulses .     Pharmacy is consulted to dose Vancomycin.    Ht Readings from Last 1 Encounters:   01/17/25 1.753 m (5' 9\")     Wt Readings from Last 1 Encounters:   01/29/25 100.9 kg (222 lb 8 oz)     Current & Prior Antimicrobial Regimen(s):  Bactrim PO - Outpatient, Per med rec states that he was not taking.  Zosyn (01/29- Current)  Vancomycin- Pharmacy to dose  2500 mg IV x1 (01/29)    Vancomycin Level(s) / Doses:    Date Time Dose Type of Level / Level Interpretation                 Note: Serum levels collected for AUC-based dosing may be high if collected in close proximity to the dose administered. This is not necessarily indicative of toxicity.    Cultures & Sensitivities:    Date Site Micro Susceptibility / Result                 Recent Labs     01/29/25  4698

## 2025-01-30 NOTE — PROGRESS NOTES
Hospital Medicine Progress Note  V 1.6      Date of Admission: 1/29/2025    Hospital Day: 2      Chief Admission Complaint: Right foot swelling    Subjective: Patient appears to be frustrated s/p wound care MRI pending.  Denies any pain    Presenting Admission History:       Bhaskar Jean-Baptiste is a 74 y.o. male with a pmh of peripheral arterial disease, DM-2, CAD, hyperlipidemia, essential hypertension, CKD stage III, CVA who presents with Wound infection of his right foot with increased swelling and nonpalpable pulses     Assessment/Plan:      Current Principal Problem:  Wound infection    #Right foot wound infection with suspected osteomyelitis  -Elevated inflammatory markers (CRP = 114, ESR = 78)  -Sent by his podiatrist recommendations  -Status post right third toe amputation on 1/6/2025  -Outpatient antibiotics for 1 week  -Pharmacy consult for vancomycin dosing  -Continue on Zosyn Q8 hourly  -Partial weightbearing on the right  -Pain relief as needed  -No Lovenox for DVT prophylaxis and SCDs not used given PAD and nonpalpable pulses  -Infectious disease consult  Discussed with ID podiatry and ID would like to do MRI of the right foot.  May adjust antibiotics     #History of PAD and nonpalpable pulses at the outpatient podiatry appointment  -History of revascularization procedure on 12/6/2024  -Per ED physician posterior tibial on the right is easily dopplerable  -Neurovascular checks every 4 hourly  -Vascular duplex lower extremity arteries bilateral  -Cardiology consulted by podiatry  Need cardiac clearance     #DM-2  -Hold glipizide and metformin  -Start on low-dose SSI with hypoglycemia protocol  -pending A1c     #CAD/CVA  -Hold aspirin and Plavix  -Continue on statins     #Essential hypertension  -Continue on losartan home doses     #CKD stage III  -Renal function stable at baseline     #Chronic anemia  -H&H stable at baseline    Ongoing threat to life and/or bodily function without ongoing treatment  Personally reviewed on 1/30/2025 and interpreted for clinical significance as documented above.     Recent Labs     01/29/25  1601 01/30/25  0828   WBC 13.5* 11.6*   HGB 11.7* 11.6*   HCT 34.8* 35.4*    296     Recent Labs     01/29/25  1601 01/30/25  0828   * 139   K 4.1 4.5   CL 98* 102   CO2 23 27   BUN 19 15   CREATININE 1.5* 1.5*   CALCIUM 9.7 9.7     No results for input(s): \"PROBNP\", \"TROPHS\" in the last 72 hours.  No results for input(s): \"LABA1C\" in the last 72 hours.  No results for input(s): \"AST\", \"ALT\", \"BILIDIR\", \"BILITOT\", \"ALKPHOS\" in the last 72 hours.  No results for input(s): \"INR\", \"LACTA\", \"TSH\" in the last 72 hours.    Urine Cultures: No results found for: \"LABURIN\"  Blood Cultures:   Lab Results   Component Value Date/Time    BC No Growth after 4 days of incubation. 11/21/2022 09:42 PM     Lab Results   Component Value Date/Time    BLOODCULT2 No Growth after 4 days of incubation. 11/21/2022 09:42 PM     Organism: No results found for: \"ORG\"      Myranda Shay, APRN - CNP

## 2025-01-30 NOTE — CONSULTS
[facility] - INITIAL CONSULTATION        CHIEF COMPLAINT  PAD      HISTORY OF PRESENTING ILLNESS  Bhaskar Jean-Baptiste is a 74 y.o. patient with diabetes, CAD, ischemic cardiomyopathy, CKD, PAD status post endovascular interventions to left lower extremity and 2022, right lower extremity in December 2024, recent right third toe amputation, hypertension, hyperlipidemia who presented to the hospital with complaints of worsening right lower extremity pain, swelling and redness around right foot wound.  He is being closely followed by interventional cardiology and podiatry in outpatient since his recent right SFA intervention and subsequent right third toe amputation following which his right foot wound was healing well until about a week ago when he started to notice worsening right leg and foot swelling and worsening pain with difficulty walking.  He was then evaluated in podiatry clinic yesterday and was noted to have difficult to palpate distal lower extremity pulses.  Interventional cardiology was consulted for further vascular assessment.  In the ED, his distal lower extremity pulses were easily found with Doppler.  In addition, he underwent formal arterial duplex study this morning that shows good perfusion and essentially biphasic blood flow throughout right and left lower extremities.  Initial right foot imaging suggests possible osteomyelitis.  ID is consulted and patient is started on broad-spectrum antibiotics.  He is being evaluated for surgical debridement of his infected wound by podiatry.      PAST MEDICAL HISTORY   has a past medical history of CAD (coronary artery disease), CKD (chronic kidney disease) stage 3, GFR 30-59 ml/min ([facility]), Diabetes mellitus ([facility]), DM (diabetes mellitus) ([facility]), HLD (hyperlipidemia), HTN (hypertension), Hyperlipidemia, Hypertension, Ischemic stroke ([facility]), and MI (myocardial infarction) ([facility]).    SURGICAL HISTORY   has a past surgical history that includes Hand

## 2025-01-30 NOTE — PROGRESS NOTES
Clinical Pharmacy Consult Note  Medication History     Admit Date: 1/29/2025    List of current medications patient is taking is complete. Home Medication list in Epic updated to reflect changes noted below.    Source of information: Patient, dispense history     Patient's home pharmacy: [id number] [place], [place] - [address] [phone] - F [phone]      Changes made to medication list:     Medications removed:   None     Medications added:   None     Medication doses adjusted:   None     Other notes:   Metformin 500 mg tablet - patient takes 2 tablets by mouth every morning and 1 tablet by mouth every evening   Sulfamethoxazole-trimethoprim 800-160 mg tablet - per dispense history last filled 01/23/2025 for a 10 day supply. Patient reports he is not taking any antibiotics currently. Unclear if he was instructed to stop or stopped on his own.   Gabapentin 300 mg capsule - per dispense history last filled 01/23/2025 for a 30 day supply, patient reports he is not taking this medication.     Current Outpatient Medications   Medication Instructions    aspirin 81 mg, Oral, DAILY    clopidogrel (PLAVIX) 75 mg, Oral, DAILY    Continuous Blood Gluc Sensor (FREESTYLE TOO 14 DAY SENSOR) [facility] USE EVERY 14 DAYS FOR GLUCOSE MONITORING    glipiZIDE (GLUCOTROL XL) 10 mg, Oral, DAILY    losartan (COZAAR) 25 mg, Oral, DAILY    metFORMIN (GLUCOPHAGE) 500 mg, Oral, 2 TIMES DAILY WITH MEALS, 2 tabs in am and 1 tab po hs    rosuvastatin (CRESTOR) 20 mg, Oral, DAILY       Thank you for consulting pharmacy,    Amina Morales, PharmD Candidate [id number]

## 2025-01-30 NOTE — PLAN OF CARE
Problem: ABCDS Injury Assessment  Goal: Absence of physical injury  1/30/2025 0656 by Amber Hayes, RN  Outcome: Progressing     Problem: Pain  Goal: Verbalizes/displays adequate comfort level or baseline comfort level  Outcome: Progressing     Problem: Safety - Adult  Goal: Free from fall injury  Outcome: Progressing

## 2025-01-30 NOTE — CONSULTS
[facility] -  Clinical Pharmacy Note    Vancomycin - Management by Pharmacy    Consult Date(s): 01/29/2025  Consulting Provider(s): Dr. Nabeel Alvarado    Assessment / Plan  Right foot wound infection with suspected osteomyelitis- Vancomycin  Concurrent Antimicrobials: Zosyn  Day of Vanc Therapy / Ordered Duration: 2  Current Dosing Method: Bayesian-Guided AUC Dosing  Therapeutic Goal: -600 mg/L*hr  Current Dose / Plan:   Hx of CKD - SCr 1.5, appears to be close to baseline.  Received loading dose of 2500mg IV x1 overnight.   Will continue 1500mg IV q24h.  Regimen predicts AUC = 538 / trough = 15.2  Level ordered for tomorrow AM, Fri 1/31 to confirm kinetic estimates   Will continue to monitor clinical condition and make adjustments to regimen as appropriate.    Please call with questions--  Angie Zaman, PharmD, BCPS  Wireless: [phone]   1/30/2025 10:37 AM          Interval update:  Awaiting consults from cardiology, vascular sx, ID.     Subjective/Objective:   Bhaskar Jean-Baptiste is a 74 y.o. male with a PMHx significant for PAD, T2DM, CAD, HLD, CKD3, CVA who presented with R foot wound infection. Admitted with R foot wound infection, suspected osteomyelitis.     Pharmacy is consulted to dose Vancomycin.    Ht Readings from Last 1 Encounters:   01/17/25 1.753 m (5' 9\")     Wt Readings from Last 1 Encounters:   01/29/25 100.9 kg (222 lb 8 oz)     Current & Prior Antimicrobial Regimen(s):  Zosyn (01/29- Current)  Vancomycin- Pharmacy to dose  2500 mg IV x1 (01/29)  1500mg IV q24h (1/30-current)    Vancomycin Level(s) / Doses:    Date Time Dose Type of Level / Level Interpretation                 Note: Serum levels collected for AUC-based dosing may be high if collected in close proximity to the dose administered. This is not necessarily indicative of toxicity.    Cultures & Sensitivities:    Date Site Micro Susceptibility / Result   N/a               Recent Labs     01/29/25  [id number]

## 2025-01-30 NOTE — PROGRESS NOTES
Pt a/o x4. VSS. Denies N/V. Neuro checks unchanged. No complaints of pain at this time. Pt is a stand and pivot out of bed x1. PWB to R lower extremity. Will continue to monitor patient.     Estefany Hinton RN

## 2025-01-31 ENCOUNTER — ANESTHESIA EVENT (OUTPATIENT)
Dept: OPERATING ROOM | Age: 75
End: 2025-01-31
Payer: MEDICARE

## 2025-01-31 ENCOUNTER — APPOINTMENT (OUTPATIENT)
Dept: GENERAL RADIOLOGY | Age: 75
DRG: 857 | End: 2025-01-31
Payer: MEDICARE

## 2025-01-31 ENCOUNTER — ANESTHESIA (OUTPATIENT)
Dept: OPERATING ROOM | Age: 75
End: 2025-01-31
Payer: MEDICARE

## 2025-01-31 LAB
ANION GAP SERPL CALCULATED.3IONS-SCNC: 11 MMOL/L (ref 3–16)
BUN SERPL-MCNC: 15 MG/DL (ref 7–20)
CALCIUM SERPL-MCNC: 10.5 MG/DL (ref 8.3–10.6)
CHLORIDE SERPL-SCNC: 103 MMOL/L (ref 99–110)
CO2 SERPL-SCNC: 26 MMOL/L (ref 21–32)
CREAT SERPL-MCNC: 1.4 MG/DL (ref 0.8–1.3)
EKG ATRIAL RATE: 67 BPM
EKG DIAGNOSIS: NORMAL
EKG P AXIS: 46 DEGREES
EKG P-R INTERVAL: 230 MS
EKG Q-T INTERVAL: 434 MS
EKG QRS DURATION: 104 MS
EKG QTC CALCULATION (BAZETT): 458 MS
EKG R AXIS: 115 DEGREES
EKG T AXIS: 10 DEGREES
EKG VENTRICULAR RATE: 67 BPM
GFR SERPLBLD CREATININE-BSD FMLA CKD-EPI: 53 ML/MIN/{1.73_M2}
GLUCOSE BLD-MCNC: 132 MG/DL (ref 70–99)
GLUCOSE BLD-MCNC: 134 MG/DL (ref 70–99)
GLUCOSE BLD-MCNC: 235 MG/DL (ref 70–99)
GLUCOSE SERPL-MCNC: 125 MG/DL (ref 70–99)
PERFORMED ON: ABNORMAL
POTASSIUM SERPL-SCNC: 4.7 MMOL/L (ref 3.5–5.1)
SODIUM SERPL-SCNC: 140 MMOL/L (ref 136–145)
VANCOMYCIN SERPL-MCNC: 20.4 UG/ML

## 2025-01-31 PROCEDURE — 7100000000 HC PACU RECOVERY - FIRST 15 MIN: Performed by: PODIATRIST

## 2025-01-31 PROCEDURE — 2500000003 HC RX 250 WO HCPCS: Performed by: PODIATRIST

## 2025-01-31 PROCEDURE — 87076 CULTURE ANAEROBE IDENT EACH: CPT

## 2025-01-31 PROCEDURE — 88311 DECALCIFY TISSUE: CPT

## 2025-01-31 PROCEDURE — 6360000002 HC RX W HCPCS

## 2025-01-31 PROCEDURE — 87205 SMEAR GRAM STAIN: CPT

## 2025-01-31 PROCEDURE — 36415 COLL VENOUS BLD VENIPUNCTURE: CPT

## 2025-01-31 PROCEDURE — 3700000001 HC ADD 15 MINUTES (ANESTHESIA): Performed by: PODIATRIST

## 2025-01-31 PROCEDURE — 2500000003 HC RX 250 WO HCPCS: Performed by: INTERNAL MEDICINE

## 2025-01-31 PROCEDURE — 1200000000 HC SEMI PRIVATE

## 2025-01-31 PROCEDURE — 93010 ELECTROCARDIOGRAM REPORT: CPT | Performed by: INTERNAL MEDICINE

## 2025-01-31 PROCEDURE — 6370000000 HC RX 637 (ALT 250 FOR IP): Performed by: INTERNAL MEDICINE

## 2025-01-31 PROCEDURE — 6360000002 HC RX W HCPCS: Performed by: INTERNAL MEDICINE

## 2025-01-31 PROCEDURE — 87102 FUNGUS ISOLATION CULTURE: CPT

## 2025-01-31 PROCEDURE — 2500000003 HC RX 250 WO HCPCS

## 2025-01-31 PROCEDURE — 87070 CULTURE OTHR SPECIMN AEROBIC: CPT

## 2025-01-31 PROCEDURE — 6370000000 HC RX 637 (ALT 250 FOR IP): Performed by: ANESTHESIOLOGY

## 2025-01-31 PROCEDURE — 3700000000 HC ANESTHESIA ATTENDED CARE: Performed by: PODIATRIST

## 2025-01-31 PROCEDURE — 87077 CULTURE AEROBIC IDENTIFY: CPT

## 2025-01-31 PROCEDURE — 6360000002 HC RX W HCPCS: Performed by: PODIATRIST

## 2025-01-31 PROCEDURE — 88305 TISSUE EXAM BY PATHOLOGIST: CPT

## 2025-01-31 PROCEDURE — 99233 SBSQ HOSP IP/OBS HIGH 50: CPT | Performed by: INTERNAL MEDICINE

## 2025-01-31 PROCEDURE — 6370000000 HC RX 637 (ALT 250 FOR IP)

## 2025-01-31 PROCEDURE — 2580000003 HC RX 258: Performed by: ANESTHESIOLOGY

## 2025-01-31 PROCEDURE — 2709999900 HC NON-CHARGEABLE SUPPLY: Performed by: PODIATRIST

## 2025-01-31 PROCEDURE — 0J9Q0ZZ DRAINAGE OF RIGHT FOOT SUBCUTANEOUS TISSUE AND FASCIA, OPEN APPROACH: ICD-10-PCS

## 2025-01-31 PROCEDURE — 73630 X-RAY EXAM OF FOOT: CPT

## 2025-01-31 PROCEDURE — 7100000001 HC PACU RECOVERY - ADDTL 15 MIN: Performed by: PODIATRIST

## 2025-01-31 PROCEDURE — 3600000014 HC SURGERY LEVEL 4 ADDTL 15MIN: Performed by: PODIATRIST

## 2025-01-31 PROCEDURE — 3600000004 HC SURGERY LEVEL 4 BASE: Performed by: PODIATRIST

## 2025-01-31 PROCEDURE — 2580000003 HC RX 258: Performed by: INTERNAL MEDICINE

## 2025-01-31 PROCEDURE — 80048 BASIC METABOLIC PNL TOTAL CA: CPT

## 2025-01-31 PROCEDURE — 0QBN0ZX EXCISION OF RIGHT METATARSAL, OPEN APPROACH, DIAGNOSTIC: ICD-10-PCS

## 2025-01-31 PROCEDURE — 87075 CULTR BACTERIA EXCEPT BLOOD: CPT

## 2025-01-31 PROCEDURE — 80202 ASSAY OF VANCOMYCIN: CPT

## 2025-01-31 PROCEDURE — 6360000002 HC RX W HCPCS: Performed by: ANESTHESIOLOGY

## 2025-01-31 RX ORDER — LORAZEPAM 2 MG/ML
0.5 INJECTION INTRAMUSCULAR
Status: DISCONTINUED | OUTPATIENT
Start: 2025-01-31 | End: 2025-01-31 | Stop reason: HOSPADM

## 2025-01-31 RX ORDER — SODIUM CHLORIDE 0.9 % (FLUSH) 0.9 %
5-40 SYRINGE (ML) INJECTION PRN
Status: DISCONTINUED | OUTPATIENT
Start: 2025-01-31 | End: 2025-01-31 | Stop reason: HOSPADM

## 2025-01-31 RX ORDER — BUPIVACAINE HYDROCHLORIDE 5 MG/ML
INJECTION, SOLUTION EPIDURAL; INTRACAUDAL PRN
Status: DISCONTINUED | OUTPATIENT
Start: 2025-01-31 | End: 2025-01-31 | Stop reason: ALTCHOICE

## 2025-01-31 RX ORDER — OXYCODONE HYDROCHLORIDE 5 MG/1
5 TABLET ORAL
Status: DISCONTINUED | OUTPATIENT
Start: 2025-01-31 | End: 2025-01-31 | Stop reason: HOSPADM

## 2025-01-31 RX ORDER — DIPHENHYDRAMINE HYDROCHLORIDE 50 MG/ML
12.5 INJECTION INTRAMUSCULAR; INTRAVENOUS
Status: DISCONTINUED | OUTPATIENT
Start: 2025-01-31 | End: 2025-01-31 | Stop reason: HOSPADM

## 2025-01-31 RX ORDER — OXYCODONE AND ACETAMINOPHEN 5; 325 MG/1; MG/1
1 TABLET ORAL EVERY 4 HOURS PRN
Status: DISCONTINUED | OUTPATIENT
Start: 2025-01-31 | End: 2025-02-05 | Stop reason: HOSPADM

## 2025-01-31 RX ORDER — ONDANSETRON 2 MG/ML
INJECTION INTRAMUSCULAR; INTRAVENOUS
Status: DISCONTINUED | OUTPATIENT
Start: 2025-01-31 | End: 2025-01-31 | Stop reason: SDUPTHER

## 2025-01-31 RX ORDER — MAGNESIUM HYDROXIDE 1200 MG/15ML
LIQUID ORAL CONTINUOUS PRN
Status: DISCONTINUED | OUTPATIENT
Start: 2025-01-31 | End: 2025-01-31 | Stop reason: HOSPADM

## 2025-01-31 RX ORDER — LABETALOL HYDROCHLORIDE 5 MG/ML
10 INJECTION, SOLUTION INTRAVENOUS
Status: DISCONTINUED | OUTPATIENT
Start: 2025-01-31 | End: 2025-01-31 | Stop reason: HOSPADM

## 2025-01-31 RX ORDER — IPRATROPIUM BROMIDE AND ALBUTEROL SULFATE 2.5; .5 MG/3ML; MG/3ML
1 SOLUTION RESPIRATORY (INHALATION)
Status: DISCONTINUED | OUTPATIENT
Start: 2025-01-31 | End: 2025-01-31 | Stop reason: HOSPADM

## 2025-01-31 RX ORDER — ONDANSETRON 2 MG/ML
4 INJECTION INTRAMUSCULAR; INTRAVENOUS
Status: DISCONTINUED | OUTPATIENT
Start: 2025-01-31 | End: 2025-01-31 | Stop reason: HOSPADM

## 2025-01-31 RX ORDER — SODIUM CHLORIDE 9 MG/ML
INJECTION, SOLUTION INTRAVENOUS PRN
Status: DISCONTINUED | OUTPATIENT
Start: 2025-01-31 | End: 2025-01-31 | Stop reason: HOSPADM

## 2025-01-31 RX ORDER — FENTANYL CITRATE 50 UG/ML
25 INJECTION, SOLUTION INTRAMUSCULAR; INTRAVENOUS EVERY 5 MIN PRN
Status: COMPLETED | OUTPATIENT
Start: 2025-01-31 | End: 2025-01-31

## 2025-01-31 RX ORDER — PROPOFOL 10 MG/ML
INJECTION, EMULSION INTRAVENOUS
Status: DISCONTINUED | OUTPATIENT
Start: 2025-01-31 | End: 2025-01-31 | Stop reason: SDUPTHER

## 2025-01-31 RX ORDER — SODIUM CHLORIDE 0.9 % (FLUSH) 0.9 %
5-40 SYRINGE (ML) INJECTION EVERY 12 HOURS SCHEDULED
Status: DISCONTINUED | OUTPATIENT
Start: 2025-01-31 | End: 2025-01-31 | Stop reason: HOSPADM

## 2025-01-31 RX ORDER — ROCURONIUM BROMIDE 10 MG/ML
INJECTION, SOLUTION INTRAVENOUS
Status: DISCONTINUED | OUTPATIENT
Start: 2025-01-31 | End: 2025-01-31 | Stop reason: SDUPTHER

## 2025-01-31 RX ORDER — LIDOCAINE HYDROCHLORIDE 20 MG/ML
INJECTION, SOLUTION INTRAVENOUS
Status: DISCONTINUED | OUTPATIENT
Start: 2025-01-31 | End: 2025-01-31 | Stop reason: SDUPTHER

## 2025-01-31 RX ORDER — NALOXONE HYDROCHLORIDE 0.4 MG/ML
INJECTION, SOLUTION INTRAMUSCULAR; INTRAVENOUS; SUBCUTANEOUS PRN
Status: DISCONTINUED | OUTPATIENT
Start: 2025-01-31 | End: 2025-01-31 | Stop reason: HOSPADM

## 2025-01-31 RX ORDER — SODIUM CHLORIDE, SODIUM LACTATE, POTASSIUM CHLORIDE, CALCIUM CHLORIDE 600; 310; 30; 20 MG/100ML; MG/100ML; MG/100ML; MG/100ML
INJECTION, SOLUTION INTRAVENOUS CONTINUOUS
Status: DISCONTINUED | OUTPATIENT
Start: 2025-01-31 | End: 2025-01-31 | Stop reason: HOSPADM

## 2025-01-31 RX ORDER — HYDROMORPHONE HYDROCHLORIDE 1 MG/ML
0.5 INJECTION, SOLUTION INTRAMUSCULAR; INTRAVENOUS; SUBCUTANEOUS EVERY 5 MIN PRN
Status: DISCONTINUED | OUTPATIENT
Start: 2025-01-31 | End: 2025-01-31 | Stop reason: HOSPADM

## 2025-01-31 RX ORDER — OXYCODONE AND ACETAMINOPHEN 5; 325 MG/1; MG/1
2 TABLET ORAL EVERY 4 HOURS PRN
Status: DISCONTINUED | OUTPATIENT
Start: 2025-01-31 | End: 2025-02-05 | Stop reason: HOSPADM

## 2025-01-31 RX ORDER — FENTANYL CITRATE 50 UG/ML
INJECTION, SOLUTION INTRAMUSCULAR; INTRAVENOUS
Status: DISCONTINUED | OUTPATIENT
Start: 2025-01-31 | End: 2025-01-31 | Stop reason: SDUPTHER

## 2025-01-31 RX ORDER — PROCHLORPERAZINE EDISYLATE 5 MG/ML
5 INJECTION INTRAMUSCULAR; INTRAVENOUS
Status: DISCONTINUED | OUTPATIENT
Start: 2025-01-31 | End: 2025-01-31 | Stop reason: HOSPADM

## 2025-01-31 RX ORDER — LIDOCAINE HYDROCHLORIDE 10 MG/ML
INJECTION, SOLUTION EPIDURAL; INFILTRATION; INTRACAUDAL; PERINEURAL PRN
Status: DISCONTINUED | OUTPATIENT
Start: 2025-01-31 | End: 2025-01-31 | Stop reason: HOSPADM

## 2025-01-31 RX ADMIN — LOSARTAN POTASSIUM 25 MG: 50 TABLET, FILM COATED ORAL at 09:54

## 2025-01-31 RX ADMIN — VANCOMYCIN 1500 MG: 1.5 INJECTION, SOLUTION INTRAVENOUS at 18:07

## 2025-01-31 RX ADMIN — OXYCODONE HYDROCHLORIDE AND ACETAMINOPHEN 2 TABLET: 5; 325 TABLET ORAL at 10:36

## 2025-01-31 RX ADMIN — SODIUM CHLORIDE: 9 INJECTION, SOLUTION INTRAVENOUS at 09:57

## 2025-01-31 RX ADMIN — FENTANYL CITRATE 25 MCG: 50 INJECTION, SOLUTION INTRAMUSCULAR; INTRAVENOUS at 07:56

## 2025-01-31 RX ADMIN — ROCURONIUM BROMIDE 50 MG: 10 INJECTION, SOLUTION INTRAVENOUS at 07:37

## 2025-01-31 RX ADMIN — WATER 2000 MG: 1 INJECTION INTRAMUSCULAR; INTRAVENOUS; SUBCUTANEOUS at 14:14

## 2025-01-31 RX ADMIN — ROSUVASTATIN CALCIUM 20 MG: 20 TABLET, FILM COATED ORAL at 20:19

## 2025-01-31 RX ADMIN — FENTANYL CITRATE 25 MCG: 50 INJECTION, SOLUTION INTRAMUSCULAR; INTRAVENOUS at 07:29

## 2025-01-31 RX ADMIN — FENTANYL CITRATE 25 MCG: 50 INJECTION INTRAMUSCULAR; INTRAVENOUS at 08:55

## 2025-01-31 RX ADMIN — PHENOL 1 SPRAY: 1.5 LIQUID ORAL at 09:34

## 2025-01-31 RX ADMIN — FENTANYL CITRATE 25 MCG: 50 INJECTION INTRAMUSCULAR; INTRAVENOUS at 08:44

## 2025-01-31 RX ADMIN — FENTANYL CITRATE 25 MCG: 50 INJECTION, SOLUTION INTRAMUSCULAR; INTRAVENOUS at 07:44

## 2025-01-31 RX ADMIN — OXYCODONE HYDROCHLORIDE AND ACETAMINOPHEN 2 TABLET: 5; 325 TABLET ORAL at 18:05

## 2025-01-31 RX ADMIN — FENTANYL CITRATE 25 MCG: 50 INJECTION INTRAMUSCULAR; INTRAVENOUS at 08:30

## 2025-01-31 RX ADMIN — ONDANSETRON 4 MG: 2 INJECTION INTRAMUSCULAR; INTRAVENOUS at 07:55

## 2025-01-31 RX ADMIN — OXYCODONE HYDROCHLORIDE AND ACETAMINOPHEN 2 TABLET: 5; 325 TABLET ORAL at 14:13

## 2025-01-31 RX ADMIN — PIPERACILLIN AND TAZOBACTAM 3375 MG: 3; .375 INJECTION, POWDER, LYOPHILIZED, FOR SOLUTION INTRAVENOUS at 01:56

## 2025-01-31 RX ADMIN — SODIUM CHLORIDE, PRESERVATIVE FREE 10 ML: 5 INJECTION INTRAVENOUS at 20:19

## 2025-01-31 RX ADMIN — FENTANYL CITRATE 25 MCG: 50 INJECTION, SOLUTION INTRAMUSCULAR; INTRAVENOUS at 07:36

## 2025-01-31 RX ADMIN — SODIUM CHLORIDE, POTASSIUM CHLORIDE, SODIUM LACTATE AND CALCIUM CHLORIDE: 600; 310; 30; 20 INJECTION, SOLUTION INTRAVENOUS at 07:13

## 2025-01-31 RX ADMIN — PIPERACILLIN AND TAZOBACTAM 3375 MG: 3; .375 INJECTION, POWDER, LYOPHILIZED, FOR SOLUTION INTRAVENOUS at 09:58

## 2025-01-31 RX ADMIN — LIDOCAINE HYDROCHLORIDE 100 MG: 20 INJECTION, SOLUTION INTRAVENOUS at 07:36

## 2025-01-31 RX ADMIN — FENTANYL CITRATE 25 MCG: 50 INJECTION INTRAMUSCULAR; INTRAVENOUS at 09:08

## 2025-01-31 RX ADMIN — SUGAMMADEX 200 MG: 100 INJECTION, SOLUTION INTRAVENOUS at 07:57

## 2025-01-31 RX ADMIN — PROPOFOL 100 MG: 10 INJECTION, EMULSION INTRAVENOUS at 07:36

## 2025-01-31 ASSESSMENT — PAIN DESCRIPTION - ORIENTATION
ORIENTATION: RIGHT

## 2025-01-31 ASSESSMENT — PAIN SCALES - GENERAL
PAINLEVEL_OUTOF10: 6
PAINLEVEL_OUTOF10: 8
PAINLEVEL_OUTOF10: 5
PAINLEVEL_OUTOF10: 7
PAINLEVEL_OUTOF10: 6
PAINLEVEL_OUTOF10: 4
PAINLEVEL_OUTOF10: 7
PAINLEVEL_OUTOF10: 6
PAINLEVEL_OUTOF10: 5

## 2025-01-31 ASSESSMENT — PAIN DESCRIPTION - DESCRIPTORS
DESCRIPTORS: OTHER (COMMENT)
DESCRIPTORS: ACHING;THROBBING
DESCRIPTORS: ACHING;THROBBING
DESCRIPTORS: THROBBING

## 2025-01-31 ASSESSMENT — PAIN DESCRIPTION - LOCATION
LOCATION: FOOT

## 2025-01-31 ASSESSMENT — PAIN DESCRIPTION - ONSET: ONSET: ON-GOING

## 2025-01-31 ASSESSMENT — PAIN DESCRIPTION - FREQUENCY: FREQUENCY: CONTINUOUS

## 2025-01-31 ASSESSMENT — PAIN - FUNCTIONAL ASSESSMENT: PAIN_FUNCTIONAL_ASSESSMENT: 0-10

## 2025-01-31 ASSESSMENT — PAIN DESCRIPTION - PAIN TYPE: TYPE: ACUTE PAIN

## 2025-01-31 NOTE — CARE COORDINATION
CM  following for  d/c  planning:    Patient from home  w/ spouse  , admitted  with  DM wound infection :  Podiatry and  ID  following , S/P I&D today plans to return to OR next week,  Patietn will likely need  Home IV atbx  and  HHC  . And poss  DME d/t  Wt. Bearing restrictions.  Will need  PTOT  post op.     [facility]erral  to [facility]  [facility]  following .       [facility]    Services Available   Home Health Services      Address   [address]  [facility] [address]             Contact Information    [phone]      [facility]    Services Available   Home Health Services      Address   [address]  [facility] [address]             Contact Information    [phone]             Electronically signed by Vashti Ty RN on 1/31/2025 at 4:27 PM     Case Management Assessment  Initial Evaluation    Date/Time of Evaluation: 1/31/2025 4:36 PM  Assessment Completed by: Vashti Ty RN    If patient is discharged prior to next notation, then this note serves as note for discharge by case management.    Patient Name: Bhaskar Jean-Baptiste                   YOB: 1950  Diagnosis: Wound infection [T14.8XXA, L08.9]  PAD (peripheral artery disease) (HCC) [I73.9]  Diabetic foot infection (HCC) [E11.628, L08.9]                   Date / Time: 1/29/2025  2:41 PM    Patient Admission Status: Inpatient   Readmission Risk (Low < 19, Mod (19-27), High > 27): Readmission Risk Score: 12.4    Current PCP: Hilda Mir MD  PCP verified by CM? Yes    Chart Reviewed: Yes      History Provided by: Patient  Patient Orientation: Alert and Oriented, Person, Place    Patient Cognition: Alert    Hospitalization in the last 30 days (Readmission):  No    If yes, Readmission Assessment in  Navigator will be completed.    Advance Directives:      Code Status: Full Code   Patient's Primary Decision Maker is: Legal Next of Kin    Primary Decision

## 2025-01-31 NOTE — PLAN OF CARE
Problem: ABCDS Injury Assessment  Goal: Absence of physical injury  Outcome: Progressing  Flowsheets (Taken 1/31/2025 1029)  Absence of Physical Injury: Implement safety measures based on patient assessment     Problem: Pain  Goal: Verbalizes/displays adequate comfort level or baseline comfort level  1/31/2025 1029 by Amanda Moran RN  Outcome: Progressing  Flowsheets (Taken 1/31/2025 1029)  Verbalizes/displays adequate comfort level or baseline comfort level:   Encourage patient to monitor pain and request assistance   Assess pain using appropriate pain scale   Administer analgesics based on type and severity of pain and evaluate response   Implement non-pharmacological measures as appropriate and evaluate response   Notify Licensed Independent Practitioner if interventions unsuccessful or patient reports new pain

## 2025-01-31 NOTE — DISCHARGE INSTRUCTIONS
[facility]   [address]  [place] [id number] [phone]    Dr. Antonio Acuña                                             Post Operative Instructions    1.  Have Prescriptions filled and take as directed.  All medications should be taken with food or milk.    2.  Keep foot elevated six inches above the level of the heart.  Support feet, legs, and knees with pillows.    3.  KEEP FOOT ELEVATED AS MUCH AS POSSIBLE UNTIL YOUR NEXT VISIT    4.  Place an ice pack on the bandaged site for 15 minutes every hour while awake    5.  Keep dressing clean, dry, and intact.  DO NOT REMOVE DRESSING.  CALL THE OFFICE IF BANDAGE COMES OFF.    6.  For the first 7 days after surgery, take temperature by mouth three times a day.  Call the office if greater than 101F.    7.  Ambulate with non-weight bearing to the right lower extremity with surgical shoe or crutches / walker.    8.  All instructions are to be followed until otherwise instructed by your surgeon.    9.  Call our office if you have any concerns or questions which arise.  Our phones are answered 24 hours a day.  [phone]    10.  Your first post-operative appointment is scheduled, call the office for appointment date and time if not known prior to today.          Caring for Your Peripherally Inserted Central Catheter (PICC)      You are going home with a peripherally inserted catheter (PICC). This small, soft tube has been placed in a vein in your arm. It is often used when treatment requires medications or nutrition for weeks or months. At home, you need to take care of your PICC to keep it working. And since a PICC line has such a high infection risk, you must take extra care washing your hands and preventing the spread of germs. This sheet will help you remember what to do to care for your PICC at home.    It was a pleasure helping you today.  Call Your Primary Infusion Nurse or Ordering Doctor with any  the methods.   To wash your hands with soap and water:  . Wet your hands with warm water. (Avoid hot water, which can cause skin irritation when you wash your hands often).  . Apply enough soap to cover the entire surface of your hands, including fingers.  . Rub your hands together vigorously for at least 15 seconds. Make sure to rub the front and back of each hand up to the wrist, your fingers and fingernails, between the fingers, and each thumb.  . Rinse your hands with warm water  . Dry your hands completely with a new paper towel. Don’t use a cloth towel or other reusable towel. These can harbor germs.  . Use the paper towel to turn off the faucet, then throw it away. If you are in a bathroom, also use a paper towel to open the door instead of touching the handle.  When you don’t have access to soap and water: Use alcohol-based hand gel. The gel should have at least 60% alcohol. Follow the instructions on the package. Your healthcare team can answer any questions you have about when to use hand gel, or when it’s better to wash with soap and water.    When to Call Your Healthcare Provider  Call your provider right away if you have any of the following:  . Pain or burning in your shoulder, chest, back, arm, or leg  . Fever of 100.4 F or higher  . Chills  . Signs of infection at the catheter site (pain, redness, drainage, burning, or stinging  . Coughing, wheezing, or shortness of breath  . A racing or irregular heartbeat  . Muscle stiffness or trouble moving  . Tightness in your arm, above the catheter site  . Gurgling noises coming from the catheter  . The catheter falls out, breaks, cracks, leaks, or has other damage

## 2025-01-31 NOTE — BRIEF OP NOTE
Brief Postoperative Note      Patient: Bhaskar Jean-Baptiste  YOB: 1950  MRN: [id number]    Date of Procedure: 1/31/2025    Pre-Op Diagnosis Codes:      * Diabetic foot infection (HCC) [E11.628, L08.9]    Post-Op Diagnosis: Same       Procedure(s):  RIGHT FOOT INCISION AND DRAINAGE WITH BONE BIOSPY    Surgeon(s):  Antonio Acuña DPM    Assistant:  Resident: Giovanny Duke DPM    Anesthesia: General    Hemostasis: anatomic dissection    Injectables: Pre-Op 10 cc of 1% Lidocaine plain and Post-Op 10 cc of 0.5 % Marcaine plain    Materials: 1/4\" iodoform packing and 3-0 nylon    Estimated Blood Loss (mL): Minimal    Complications: None    Specimens:   ID Type Source Tests Collected by Time Destination   1 : RIGHT FOOT DEEP WOUND CULTURE Body Fluid Fluid CULTURE, ANAEROBIC, CULTURE, FUNGUS, CULTURE, BODY FLUID (WITH GRAM STAIN), CULTURE WITH SMEAR, ACID FAST BACILLIUS Antonio Acuña DPM 1/31/2025 0749    A : THIRD METATARSAL HEAD RIGHT FOOT Tissue Tissue SURGICAL PATHOLOGY Antonio Acuña DPM 1/31/2025 0750        Implants:  * No implants in log *      Drains: * No LDAs found *    Findings:  Infection Present At Time Of Surgery (PATOS) (choose all levels that have infection present):  - Organ Space infection (below fascia) present as evidenced by osteomyelitis  Other Findings: Limited bleeding intra-op. Approximately 2cc of purulence expressed. 3rd metatarsal head was noted to be soft however white in color.    DISPO: S/p Right foot I&D with bone biopsy. Incision packed open with iodoform. Continue IV antibiotics per ID. Partial weightbearing with heel touch to RLE. Surgical path and culture obtained. Post OP XR ordered. PT/OT consulted. Patient will need to return to the OR once wound has been converted from dirty to clean for repeat I&D with DPC, likely early next week pending OR and surgeon availability

## 2025-01-31 NOTE — PLAN OF CARE
Problem: Safety - Adult  Goal: Free from fall injury  1/31/2025 0439 by Jenny Gonzalez, RN  Outcome: Progressing  Flowsheets (Taken 1/31/2025 0439)  Free From Fall Injury:   Instruct family/caregiver on patient safety   Based on caregiver fall risk screen, instruct family/caregiver to ask for assistance with transferring infant if caregiver noted to have fall risk factors  1/30/2025 1659 by Estefany Hinton, RN  Note: Patient free from falls/ injury during duration of shift. Bed alarm on, call light w/in reach, bed in lowest position, non-skid socks on and fall sign posted outside door.      Problem: Pain  Goal: Verbalizes/displays adequate comfort level or baseline comfort level  1/31/2025 0439 by Jenny Gonzalez, RN  Outcome: Progressing  Flowsheets (Taken 1/31/2025 0439)  Verbalizes/displays adequate comfort level or baseline comfort level:   Encourage patient to monitor pain and request assistance   Assess pain using appropriate pain scale   Administer analgesics based on type and severity of pain and evaluate response   Notify Licensed Independent Practitioner if interventions unsuccessful or patient reports new pain   Consider cultural and social influences on pain and pain management   Implement non-pharmacological measures as appropriate and evaluate response

## 2025-01-31 NOTE — PLAN OF CARE
Strikethrough noted to the dressing to the right lower extremity.  Dressing was reinforced.  No concern for active bleeders at this time and strikethrough is likely 2/2 bone bleeding.  Please leave dressing clean, dry, intact and reinforce as needed.  Podiatry will begin early tomorrow morning to change dressing.  Patient complaining of 6 out of 10 pain but recently took oxycodone p.o.  Will continue to monitor patient's pain level.      Discussed with THA Sargent DPM   Chief Podiatric Resident PGY3  Pager [phone] or PerfectServe  1/31/2025, 10:59 AM

## 2025-01-31 NOTE — DISCHARGE INSTR - COC
Continuity of Care Form    Patient Name: Bhaskar Jean-Baptiste   :  1950  MRN:  [id number]    Admit date:  2025  Discharge date:  ***    Code Status Order: Full Code   Advance Directives:   Advance Care Flowsheet Documentation        Date/Time Healthcare Directive Type of Healthcare Directive Copy in Chart Healthcare Agent Appointed Healthcare Agent's Name Healthcare Agent's Phone Number    [phone] No, patient does not have an advance directive for healthcare treatment  --  --  --  --  --                     Admitting Physician:  Nabeel Alvarado MD  PCP: Hilda Mir MD    Discharging Nurse: ***  Discharging Hospital Unit/Room#: 6313/6313-01  Discharging Unit Phone Number: ***    Emergency Contact:   Extended Emergency Contact Information  Primary Emergency Contact: yissel lemus  Home Phone: [phone]  Mobile Phone: [phone]  Relation: Spouse  Secondary Emergency Contact: jaz min  Home Phone: [phone]  Mobile Phone: [phone]  Relation: Brother/Sister    Past Surgical History:  Past Surgical History:   Procedure Laterality Date    ANKLE SURGERY Left 2022    REPAIR PERONEUS BREVIS-LEFT FOOT performed by Antonio Acuña DPM at [facility] OR    COLONOSCOPY      FOOT DEBRIDEMENT Left 2022    LEFT FOOT DEBRIDEMENT INCISION AND DRAINAGE WITH 5TH METATARSAL OSTECTOMY AND DELAYED PRIMARY performed by Antonio Acuña DPM at [facility] OR    FOOT DEBRIDEMENT Left 2023    SURGICAL PREPARATION OF WOUND BED LEFT FOOT, APPLICATION OF DERMAL GRAFT LEFT FOOT performed by Antonio Acuña DPM at [facility] OR    FOOT DEBRIDEMENT Right 2025    RIGHT FOOT INCISION AND DRAINAGE WITH BONE BIOSPY performed by Antonio Acuña DPM at [facility] OR    FOOT SURGERY Left 2022    OSTECTOMY FIFTH METATARSAL-LEFT-POPLITEAL BLOCK-LIZANDRO performed by Antonio Acuña DPM at [facility] OR    HAND SURGERY Left     HERNIA REPAIR      INVASIVE VASCULAR N/A 2024    Colostomy/Ileostomy/Ileal Conduit: {YES / NO:87985}       Date of Last BM: ***    Intake/Output Summary (Last 24 hours) at 2025 1628  Last data filed at 2025 0808  Gross per 24 hour   Intake 640 ml   Output --   Net 640 ml     I/O last 3 completed shifts:  In: 1266.9 [P.O.:770; IV Piggyback:496.9]  Out: 900 [Urine:900]    Safety Concerns:     { [facility] Safety Concerns:434926782}    Impairments/Disabilities:      {[facility] Impairments/Disabilities:572971180}    Nutrition Therapy:  Current Nutrition Therapy:   { [facility] Diet List:163456170}    Routes of Feeding: {[facility] Other Feedings:632114437}  Liquids: {Slp liquid thickness:24740}  Daily Fluid Restriction: {[facility] DME Yes amt example:219054955}  Last Modified Barium Swallow with Video (Video Swallowing Test): {Done Not Done Date:}    Treatments at the Time of Hospital Discharge:   Respiratory Treatments: ***  Oxygen Therapy:  {Therapy; copd oxygen:14416}  Ventilator:    {[facility] Vent List:321190652}    Rehab Therapies: {THERAPEUTIC INTERVENTION:5246791399}  Weight Bearing Status/Restrictions: {[facility] Weight Bearin}  Other Medical Equipment (for information only, NOT a [facility] order):  {EQUIPMENT:529162899}  Other Treatments: ***    Patient's personal belongings (please select all that are sent with patient):  {[facility] DME Belongings:065646280}    RN SIGNATURE:  {Esignature:183523668}    CASE MANAGEMENT/SOCIAL WORK SECTION    Inpatient Status Date: 2025    Readmission Risk Assessment Score:  [facility] RISK OF UNPLANNED READMISSION 2.0             12.4 Total Score        Discharging to Facility/ Agency     [facility]          Services Available   Home Health Services      Address   [address]  [facility] [address]             Contact Information    [phone]      [facility]          Services Available   Home Health Services      Address   [address]  [facility] [address]

## 2025-01-31 NOTE — OP NOTE
Operative Note      Patient: Bhaskar Jean-Baptiste  YOB: 1950  MRN: [id number]    Date of Procedure: 1/31/2025    Pre-Op Diagnosis Codes:      * Diabetic foot infection (HCC) [E11.628, L08.9]    Post-Op Diagnosis: Same       Procedure(s):  RIGHT FOOT INCISION AND DRAINAGE WITH BONE BIOSPY    Surgeon(s):  Antonio Acuña DPM    Assistant:   Resident: Giovanny Duke DPM    Anesthesia: General    Hemostasis: anatomic dissection     Injectables: Pre-Op 10 cc of 1% Lidocaine plain and Post-Op 10 cc of 0.5 % Marcaine plain     Materials: 1/4\" iodoform packing and 3-0 nylon     Estimated Blood Loss (mL): Minimal    Complications: None    Specimens:   ID Type Source Tests Collected by Time Destination   1 : RIGHT FOOT DEEP WOUND CULTURE Body Fluid Fluid CULTURE, ANAEROBIC, CULTURE, FUNGUS, CULTURE, BODY FLUID (WITH GRAM STAIN), CULTURE WITH SMEAR, ACID FAST BACILLIUS Antonio Acuña DPM 1/31/2025 0749    A : THIRD METATARSAL HEAD RIGHT FOOT Tissue Tissue SURGICAL PATHOLOGY Antonio Acuña DPM 1/31/2025 0750        Implants:  * No implants in log *      Drains: * No LDAs found *    Findings:  Infection Present At Time Of Surgery (PATOS) (choose all levels that have infection present):  - Organ Space infection (below fascia) present as evidenced by osteomyelitis  Other Findings: Limited bleeding intra-op. Approximately 2cc of purulence expressed. 3rd metatarsal head was noted to be soft however white in color.     INDICATIONS FOR PROCEDURE: This patient has signs and symptoms clinically  consistent with the above mentioned preoperative diagnosis. Having failed conservative treatment, it was determined that the patient would benefit from surgical intervention. All potential risks, benefits, and complications were discussed with the patient prior to the scheduling of surgery. All the patient's questions were answered and no guarantees were given. The patient wished to proceed with surgery, and

## 2025-01-31 NOTE — ANESTHESIA PRE PROCEDURE
Department of Anesthesiology  Preprocedure Note       Name:  Bhaskar Jean-Baptiste   Age:  74 y.o.  :  1950                                          MRN:  [id number]         Date:  2025      Surgeon: Surgeon(s):  Antonio Acuña DPM    Procedure: Procedure(s):  RIGHT FOOT INCISION AND DRAINAGE WITH BONE BIOSPY    Medications prior to admission:   Prior to Admission medications    Medication Sig Start Date End Date Taking? Authorizing Provider   glipiZIDE (GLUCOTROL XL) 10 MG extended release tablet Take 1 tablet by mouth daily 24  Yes Rubin Contreras MD   clopidogrel (PLAVIX) 75 MG tablet Take 1 tablet by mouth daily   Yes Rubin Contreras MD   Continuous Blood Gluc Sensor (FREESTYLE TOO 14 DAY SENSOR) MISC USE EVERY 14 DAYS FOR GLUCOSE MONITORING 22  Yes Rubin Contreras MD   aspirin 81 MG EC tablet Take 1 tablet by mouth daily   Yes Rubin Contreras MD   metFORMIN (GLUCOPHAGE) 500 MG tablet Take 1 tablet by mouth 2 times daily (with meals) 2 tabs in am and 1 tab po hs   Yes ProviderRubin MD   losartan (COZAAR) 25 MG tablet Take 1 tablet by mouth daily   Yes Rubin Contreras MD   rosuvastatin (CRESTOR) 20 MG tablet Take 1 tablet by mouth daily   Yes ProviderRubin MD       Current medications:    Current Facility-Administered Medications   Medication Dose Route Frequency Provider Last Rate Last Admin    calcium carbonate (TUMS) chewable tablet 500 mg  500 mg Oral TID PRN Myranda Shay, ODALIS - CNP   500 mg at 25 1134    [Held by provider] aspirin EC tablet 81 mg  81 mg Oral Daily Nabeel Alvarado MD        [Held by provider] clopidogrel (PLAVIX) tablet 75 mg  75 mg Oral Daily Nabeel Alvarado MD        losartan (COZAAR) tablet 25 mg  25 mg Oral Daily Nabeel Alvarado MD   25 mg at 25 0910    rosuvastatin (CRESTOR) tablet 20 mg  20 mg Oral Nightly Nabeel Alvarado MD   20 mg at 25 2121    glucose

## 2025-01-31 NOTE — PROGRESS NOTES
0915  PACU Transfer Note    Vitals:    01/31/25 0915   BP: (115/89   Pulse: 70   Resp: 13   Temp: 97.9 °F (36.6 °C)   SpO2: 94%       In: 400 [I.V.:400]  Out: -     Pain assessment:  present - adequately treated  Pain Level: 5    Report given to Receiving unit MARIN MICHAUD    1/31/2025 10:14 AM

## 2025-01-31 NOTE — ANESTHESIA POSTPROCEDURE EVALUATION
Department of Anesthesiology  Postprocedure Note    Patient: Bhaskar Jean-Baptiste  MRN: [id number]  YOB: 1950  Date of evaluation: 1/31/2025    Procedure Summary       Date: 01/31/25 Room / Location: [address]    Anesthesia Start: 0728 Anesthesia Stop: 0812    Procedure: RIGHT FOOT INCISION AND DRAINAGE WITH BONE BIOSPY (Right) Diagnosis:       Diabetic foot infection (HCC)      (Diabetic foot infection (HCC) [E11.628, L08.9])    Surgeons: Antonio Acuña DPM Responsible Provider: Sin Fairchild MD    Anesthesia Type: general ASA Status: 3            Anesthesia Type: No value filed.    Kranthi Phase I: Kranthi Score: 9    Kranthi Phase II:      Anesthesia Post Evaluation    Patient location during evaluation: PACU  Patient participation: complete - patient participated  Level of consciousness: awake  Airway patency: patent  Nausea & Vomiting: no nausea and no vomiting  Cardiovascular status: blood pressure returned to baseline and hemodynamically stable  Respiratory status: acceptable  Hydration status: euvolemic  Multimodal analgesia pain management approach  Pain management: adequate    No notable events documented.

## 2025-01-31 NOTE — PROGRESS NOTES
Pt returned to room from PACU. VSS, pt has no c/o pain at this time. Breakfast tray ordered, will continue to monitor.

## 2025-01-31 NOTE — PROGRESS NOTES
Pt arrived restless from anesthesia dry throat with irritation throat spray orRIGHT FOOT INCISION AND DRAINAGE WITH BONE BIOSPY - Right dered by Dr Fairchild

## 2025-01-31 NOTE — PROGRESS NOTES
[facility] -  Clinical Pharmacy Note    Vancomycin - Management by Pharmacy    Consult Date(s): 01/29/2025  Consulting Provider(s): Dr. Nabeel Alvarado    Assessment / Plan  1)  Rt diabetic foot infection - Vancomycin  Concurrent Antimicrobials: Zosyn  Day of Vanc Therapy / Ordered Duration: 3 of TBD  Current Dosing Method: Bayesian-Guided AUC Dosing  Therapeutic Goal: -600 mg/L*hr  Current Dose / Plan:   Hx of CKD - baseline appears to be ~1.5.  SCr 1.4 today.  Currently on 1500mg IV q24h.  Random level this AM = 20.4;  calculated AUC = 561 with trough = 15.5 mcg/mL.  Continue same regimen.  Will plan for repeat level in ~2-3 days (or sooner if clinically indicated).   Will continue to monitor clinical condition and make adjustments to regimen as appropriate.    Please call with questions--  Thanks--  Alejandra Hoyt, PharmD, BCPS, BCGP  [phone] ([facility])   1/31/2025 10:51 AM      Interval update:  Pt is now s/p Rt foot I&D with bone biopsy (done this AM).  Surgical cultures sent.  ID following.    Subjective/Objective:   Bhaskar Jean-Baptiste is a 74 y.o. male with a PMHx significant for PAD, T2DM, CAD, HLD, CKD3, CVA who presented with R foot wound infection. Admitted with R foot wound infection, suspected osteomyelitis.     Pharmacy is consulted to dose Vancomycin.    Ht Readings from Last 1 Encounters:   01/17/25 1.753 m (5' 9\")     Wt Readings from Last 1 Encounters:   01/29/25 100.9 kg (222 lb 8 oz)     Current & Prior Antimicrobial Regimen(s):  Zosyn (01/29- Current)  Vancomycin- Pharmacy to dose  2500 mg IV x1 (01/29)  1500mg IV q24h (1/30-current)    Vancomycin Level(s) / Doses:    Date Time Dose Type of Level / Level Interpretation   1/31 05:02 1500mg q24h Random = 20.4 mcg/mL Drawn ~12.5h after 2nd total dose  AUC = 561; ssTr = 15.5  Continue same regimen          Note: Serum levels collected for AUC-based dosing may be high if collected in close proximity to the dose  administered. This is not necessarily indicative of toxicity.    Cultures & Sensitivities:    Date Site Micro Susceptibility / Result   1/31 Surgical cx sent            Recent Labs     01/29/25  1601 01/30/25  0828 01/31/25  0502   CREATININE 1.5* 1.5* 1.4*   BUN 19 15 15   WBC 13.5* 11.6*  --        Estimated Creatinine Clearance: 56 mL/min (A) (based on SCr of 1.4 mg/dL (H)).    Additional Lab Values / Findings of Note:    No results for input(s): \"PROCAL\" in the last 72 hours.

## 2025-01-31 NOTE — PROGRESS NOTES
[facility] Medicine Progress Note  V 1.6      Date of Admission: 1/29/2025    Hospital Day: 3      Chief Admission Complaint: Right foot swelling    Subjective: Patient appears to be frustrated s/p wound care MRI pending.  Denies any pain    Presenting Admission History:       Bhaskar Jean-Baptiste is a 74 y.o. 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/2025  -Outpatient antibiotics for 1 week  -Pharmacy consult for vancomycin dosing  -Continue on Zosyn Q8 hourly  -Partial weightbearing on the right  -Pain relief as needed  -No Lovenox for DVT prophylaxis and SCDs not used given PAD and nonpalpable pulses  -Infectious disease continued with antibiotics with vancomycin and dcZosyn-this was changed to ceftriaxone per ID  Patient was seen by cardiologist-cleared for surgical intervention patient went to the OR for I&D and biopsy earlier today       #History of PAD and nonpalpable pulses at the outpatient podiatry appointment  -History of revascularization procedure on 12/6/2024  -Per ED physician posterior tibial on the right is easily dopplerable  -Neurovascular checks every 4 hourly  -Vascular duplex lower extremity arteries bilateral  -Cardiology consulted by podiatry  Need cardiac clearance     #DM-2  -Hold glipizide and metformin  -Start on low-dose SSI with hypoglycemia protocol  -pending A1c     #CAD/CVA  -Hold aspirin and Plavix  -Continue on statins     #Essential hypertension  -Continue on losartan home doses     #CKD stage III  -Renal function stable at baseline     #Chronic anemia  -H&H stable at

## 2025-02-01 LAB
ANION GAP SERPL CALCULATED.3IONS-SCNC: 9 MMOL/L (ref 3–16)
BUN SERPL-MCNC: 14 MG/DL (ref 7–20)
CALCIUM SERPL-MCNC: 9.7 MG/DL (ref 8.3–10.6)
CHLORIDE SERPL-SCNC: 99 MMOL/L (ref 99–110)
CO2 SERPL-SCNC: 28 MMOL/L (ref 21–32)
CREAT SERPL-MCNC: 1.4 MG/DL (ref 0.8–1.3)
GFR SERPLBLD CREATININE-BSD FMLA CKD-EPI: 53 ML/MIN/{1.73_M2}
GLUCOSE BLD-MCNC: 148 MG/DL (ref 70–99)
GLUCOSE BLD-MCNC: 195 MG/DL (ref 70–99)
GLUCOSE BLD-MCNC: 217 MG/DL (ref 70–99)
GLUCOSE BLD-MCNC: 230 MG/DL (ref 70–99)
GLUCOSE SERPL-MCNC: 159 MG/DL (ref 70–99)
LOEFFLER MB STN SPEC: NORMAL
PERFORMED ON: ABNORMAL
POTASSIUM SERPL-SCNC: 4.4 MMOL/L (ref 3.5–5.1)
SODIUM SERPL-SCNC: 136 MMOL/L (ref 136–145)

## 2025-02-01 PROCEDURE — 6370000000 HC RX 637 (ALT 250 FOR IP): Performed by: INTERNAL MEDICINE

## 2025-02-01 PROCEDURE — 1200000000 HC SEMI PRIVATE

## 2025-02-01 PROCEDURE — 6360000002 HC RX W HCPCS: Performed by: INTERNAL MEDICINE

## 2025-02-01 PROCEDURE — 6360000002 HC RX W HCPCS

## 2025-02-01 PROCEDURE — 2500000003 HC RX 250 WO HCPCS: Performed by: INTERNAL MEDICINE

## 2025-02-01 PROCEDURE — 36415 COLL VENOUS BLD VENIPUNCTURE: CPT

## 2025-02-01 PROCEDURE — 6370000000 HC RX 637 (ALT 250 FOR IP)

## 2025-02-01 PROCEDURE — 80048 BASIC METABOLIC PNL TOTAL CA: CPT

## 2025-02-01 RX ORDER — HYDROMORPHONE HYDROCHLORIDE 1 MG/ML
0.5 INJECTION, SOLUTION INTRAMUSCULAR; INTRAVENOUS; SUBCUTANEOUS ONCE
Status: COMPLETED | OUTPATIENT
Start: 2025-02-01 | End: 2025-02-01

## 2025-02-01 RX ADMIN — VANCOMYCIN 1500 MG: 1.5 INJECTION, SOLUTION INTRAVENOUS at 17:45

## 2025-02-01 RX ADMIN — OXYCODONE HYDROCHLORIDE AND ACETAMINOPHEN 2 TABLET: 5; 325 TABLET ORAL at 20:01

## 2025-02-01 RX ADMIN — OXYCODONE HYDROCHLORIDE AND ACETAMINOPHEN 2 TABLET: 5; 325 TABLET ORAL at 06:28

## 2025-02-01 RX ADMIN — HYDROMORPHONE HYDROCHLORIDE 0.5 MG: 1 INJECTION, SOLUTION INTRAMUSCULAR; INTRAVENOUS; SUBCUTANEOUS at 09:44

## 2025-02-01 RX ADMIN — ROSUVASTATIN CALCIUM 20 MG: 20 TABLET, FILM COATED ORAL at 19:58

## 2025-02-01 RX ADMIN — OXYCODONE HYDROCHLORIDE AND ACETAMINOPHEN 2 TABLET: 5; 325 TABLET ORAL at 11:46

## 2025-02-01 RX ADMIN — LOSARTAN POTASSIUM 25 MG: 50 TABLET, FILM COATED ORAL at 09:45

## 2025-02-01 RX ADMIN — WATER 2000 MG: 1 INJECTION INTRAMUSCULAR; INTRAVENOUS; SUBCUTANEOUS at 16:20

## 2025-02-01 RX ADMIN — SODIUM CHLORIDE, PRESERVATIVE FREE 10 ML: 5 INJECTION INTRAVENOUS at 19:59

## 2025-02-01 RX ADMIN — SODIUM CHLORIDE, PRESERVATIVE FREE 10 ML: 5 INJECTION INTRAVENOUS at 09:45

## 2025-02-01 ASSESSMENT — PAIN - FUNCTIONAL ASSESSMENT
PAIN_FUNCTIONAL_ASSESSMENT: PREVENTS OR INTERFERES SOME ACTIVE ACTIVITIES AND ADLS
PAIN_FUNCTIONAL_ASSESSMENT: ACTIVITIES ARE NOT PREVENTED
PAIN_FUNCTIONAL_ASSESSMENT: ACTIVITIES ARE NOT PREVENTED

## 2025-02-01 ASSESSMENT — PAIN SCALES - GENERAL
PAINLEVEL_OUTOF10: 8
PAINLEVEL_OUTOF10: 7
PAINLEVEL_OUTOF10: 7
PAINLEVEL_OUTOF10: 6
PAINLEVEL_OUTOF10: 3
PAINLEVEL_OUTOF10: 0
PAINLEVEL_OUTOF10: 10
PAINLEVEL_OUTOF10: 0

## 2025-02-01 ASSESSMENT — PAIN DESCRIPTION - ORIENTATION
ORIENTATION: RIGHT

## 2025-02-01 ASSESSMENT — PAIN DESCRIPTION - FREQUENCY
FREQUENCY: CONTINUOUS

## 2025-02-01 ASSESSMENT — PAIN DESCRIPTION - ONSET
ONSET: ON-GOING

## 2025-02-01 ASSESSMENT — PAIN DESCRIPTION - DESCRIPTORS
DESCRIPTORS: ACHING;DISCOMFORT
DESCRIPTORS: ACHING;SHARP;THROBBING
DESCRIPTORS: ACHING;DISCOMFORT
DESCRIPTORS: ACHING

## 2025-02-01 ASSESSMENT — PAIN DESCRIPTION - LOCATION
LOCATION: FOOT

## 2025-02-01 ASSESSMENT — PAIN DESCRIPTION - PAIN TYPE
TYPE: ACUTE PAIN

## 2025-02-01 ASSESSMENT — PAIN SCALES - WONG BAKER
WONGBAKER_NUMERICALRESPONSE: NO HURT
WONGBAKER_NUMERICALRESPONSE: HURTS A LITTLE BIT
WONGBAKER_NUMERICALRESPONSE: HURTS A LITTLE BIT

## 2025-02-01 NOTE — PROGRESS NOTES
[facility] -  Clinical Pharmacy Note    Vancomycin - Management by Pharmacy    Consult Date(s): 01/29/2025  Consulting Provider(s): Dr. Nabeel Alvarado    Assessment / Plan  1)  Rt diabetic foot infection - Vancomycin  Concurrent Antimicrobials: Zosyn  Day of Vanc Therapy / Ordered Duration: 4 of TBD  Current Dosing Method: Bayesian-Guided AUC Dosing  Therapeutic Goal: -600 mg/L*hr  Current Dose / Plan:   Hx of CKD - baseline appears to be ~1.5.  SCr remains at 1.4 today.  Currently on 1500mg IV q24h.  Regimen predicts AUCss = 564 with troughss = 15.6 mcg/mL.  Plan: Continue same regimen and obtain another random level in 2-3 days to reassess kinetics unless clinically indicated to obtain earlier  Will continue to monitor clinical condition and make adjustments to regimen as appropriate.    Please call with questions--  Thanks--  Manas Cotton, PharmD  PGY1 Pharmacy Resident   Wireless: [phone]  02/01/25       Interval update: NAEON. Patient slightly hypertensive this AM but otherwise HDS on RA. Given concerns for potential nephrotoxicity, zosyn changed to rocephin yesterday per ID. Patient to go back to OR next week for closure.     Subjective/Objective:   Bhaskar Jean-Baptiste is a 74 y.o. male with a PMHx significant for PAD, T2DM, CAD, HLD, CKD3, CVA who presented with R foot wound infection. Admitted with R foot wound infection, suspected osteomyelitis.     Pharmacy is consulted to dose Vancomycin.    Ht Readings from Last 1 Encounters:   01/17/25 1.753 m (5' 9\")     Wt Readings from Last 1 Encounters:   01/29/25 100.9 kg (222 lb 8 oz)     Current & Prior Antimicrobial Regimen(s):  Zosyn (01/29- Current)  Vancomycin- Pharmacy to dose  2500 mg IV x1 (01/29)  1500mg IV q24h (1/30-current)    Vancomycin Level(s) / Doses:    Date Time Dose Type of Level / Level Interpretation   1/31 05:02 1500mg q24h Random = 20.4 mcg/mL Drawn ~12.5h after 2nd total dose  AUC = 561; ssTr = 15.5  Continue same

## 2025-02-01 NOTE — PROGRESS NOTES
Physical Therapy  Refusal    PT referral received, chart reviewed.  Spoke with pt who is hesitant to get OOB presently due to episode of excruciating pain in the early AM.  RN is present to pain medicate pt, but pt continues to be resistant to therapy at this time.  Discussed therapy role and importance of mobility with pt for ~10 mins and will attempt to return later this date.    2:00 pm  Returned to see pt in PM.  Pt states \"I'm not getting up today, I finally don't have any pain\".  Pt refuses despite encouragement.    Fifi Hunter PT [id number]

## 2025-02-01 NOTE — PROGRESS NOTES
[facility] Medicine Progress Note  V 1.6      Date of Admission: 1/29/2025    Hospital Day: 4      Chief Admission Complaint: Right foot swelling    Subjective: Refused therapy this morning due to pain.;  Concerned about a new spot on his right foot adjacent to the fifth toe pictures in chart    Presenting Admission History:       Bhaskar Jean-Baptiste is a 74 y.o. 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/2025  -Outpatient antibiotics for 1 week  -Pharmacy consult for vancomycin dosing  -Continue on Zosyn Q8 hourly  -Partial weightbearing on the right  -Pain relief as needed  -No Lovenox for DVT prophylaxis and SCDs not used given PAD and nonpalpable pulses  -Infectious disease continued with antibiotics with vancomycin and dcZosyn-this was changed to ceftriaxone per ID  Patient was seen by cardiologist-cleared for surgical intervention   S/p I&D on 1/31/25  PT OT evaluation is pending-partial weightbearing with heel touch to all RLE-patient will need to return to the OR once wound has been converted from dirty to clean for repeat id;  Tentative OR on Monday     #History of PAD and nonpalpable pulses at the outpatient podiatry appointment  -History of revascularization procedure on 12/6/2024       #DM-2  -Hold glipizide and metformin  -Start on low-dose SSI with hypoglycemia protocol  -pending A1c     #CAD/CVA  -Hold aspirin and Plavix  -Continue on statins     #Essential hypertension  -Continue on losartan home doses     #CKD stage III  -Renal function stable at baseline     #Chronic anemia  -H&H stable at

## 2025-02-01 NOTE — PLAN OF CARE
Problem: ABCDS Injury Assessment  Goal: Absence of physical injury  Outcome: Progressing  Flowsheets (Taken 2/1/2025 1253)  Absence of Physical Injury: Implement safety measures based on patient assessment     Problem: Pain  Goal: Verbalizes/displays adequate comfort level or baseline comfort level  Outcome: Progressing  Flowsheets (Taken 2/1/2025 1253)  Verbalizes/displays adequate comfort level or baseline comfort level:   Encourage patient to monitor pain and request assistance   Assess pain using appropriate pain scale   Administer analgesics based on type and severity of pain and evaluate response   Implement non-pharmacological measures as appropriate and evaluate response   Notify Licensed Independent Practitioner if interventions unsuccessful or patient reports new pain

## 2025-02-01 NOTE — PROGRESS NOTES
Podiatric Surgery Daily Progress Note  Bhaskar Jean-Baptiste      Subjective :   Patient seen and examined this am at the bedside. S/p Right foot I&D with bone biopsy (1/31/25). POD #1.  Patient was strikethrough to dressing yesterday which was reinforced.  Patient continues to complain of throbbing pain to the right lower extremity.  Patient denies any acute overnight events. Patient denies N/V/F/C/SOB. Patient denies calf pain, thigh pain, chest pain.     Review of Systems: A 12 point review of symptoms is unremarkable with the exception of the chief complaint. Patient specifically denies nausea, fever, vomiting, chills, shortness of breath, chest pain, abdominal pain, constipation or difficulty urinating.       Objective     BP (!) 146/84   Pulse 77   Temp 98.7 °F (37.1 °C) (Oral)   Resp 14   Wt 100.9 kg (222 lb 8 oz)   SpO2 98%   BMI 32.86 kg/m²      I/O:  Intake/Output Summary (Last 24 hours) at 2/1/2025 0712  Last data filed at 1/31/2025 1840  Gross per 24 hour   Intake 640 ml   Output 1 ml   Net 639 ml              Wt Readings from Last 3 Encounters:   01/29/25 100.9 kg (222 lb 8 oz)   01/17/25 99.3 kg (219 lb)   01/06/25 100.3 kg (221 lb 1 oz)       LABS:    Recent Labs     01/29/25  1601 01/30/25  0828   WBC 13.5* 11.6*   HGB 11.7* 11.6*   HCT 34.8* 35.4*    296        Recent Labs     02/01/25  0459      K 4.4   CL 99   CO2 28   BUN 14   CREATININE 1.4*        Recent Labs     01/30/25  1904   INR 1.20*           LOWER EXTREMITY EXAMINATION    Dressing to right LE intact. No strikethrough noted to the external dressing.  Sanguinous drainage noted to the internal layers of the dressing.     VASCULAR: DP and PT pulses are non-palpable 0/4 right LE however are palpable 2 out of 4 to left lower extremity.  Upon Doppler examination DP and PT pulses to the right lower extremity were found to be monophasic.  CFT is brisk to the digits of the foot b/l. Skin temperature is warm to cool from  Clinical correlation recommended.     Severe stenosis mid left superficial femoral artery measuring greater than 75% based upon velocity changes and associated with a less than 50% stenosis of the remainder of the superficial femoral artery and popliteal arteries.  Normal flow velocities through all tibial vessels however imaging is limited by calcific changes.  Clinical correlation necessary    MRI right foot 1/30/2025  IMPRESSION:  1. Acute osteomyelitis involving the second and third metatarsals as well as the second proximal phalanx.  2. Soft tissue surrounding the second MTP joint demonstrate a lack of enhancement compatible with gangrenous changes no definite abscess.  3. Mild tenosynovitis surrounding the FHL.  4. Findings suggestive of a subacute fracture involving the navicular bone, with a small amount of marrow edema.       XR foot 1/31/2025  FINDINGS/IMPRESSION:     Postsurgical changes of distal third ray amputation. There is erosion of the   distal second and third third metatarsals consistent with osteomyelitis   described on recent MRI.. Mild degenerative changes of the midfoot. Mild dorsal   and plantar calcaneal spurring.   ASSESSMENT/PLAN  -S/p Right foot I&D with bone biopsy (1/31/25)  -S/p right third toe amp (DOS 1/6/2025)  -PAD  -Diabetes mellitus type 2 with peripheral neuropathy  -CAD  -History of stroke     -Patient seen and examined at bedside  -Hypertensive otherwise VSS, no AM labs  -ESR 78 and   -Images reviewed, impression noted above  -Surgical culture pending  -Surgical path pending  -ID following; continue vancomycin, ceftriaxone appreciate continued recommendations  -Right lower extremity dressed with quarter inch iodoform packing, dry gauze, Kerlix and Ace  -Patient with postoperative lalit shoe to be worn at all times  -Patient should be partial heel weightbearing to right LE in surgical lalit shoe only  -PT OT consult placed, patient must utilize surgical shoe to the right

## 2025-02-02 LAB
ANION GAP SERPL CALCULATED.3IONS-SCNC: 9 MMOL/L (ref 3–16)
BUN SERPL-MCNC: 15 MG/DL (ref 7–20)
CALCIUM SERPL-MCNC: 9.6 MG/DL (ref 8.3–10.6)
CHLORIDE SERPL-SCNC: 101 MMOL/L (ref 99–110)
CO2 SERPL-SCNC: 28 MMOL/L (ref 21–32)
CREAT SERPL-MCNC: 1.4 MG/DL (ref 0.8–1.3)
CRP SERPL-MCNC: 152 MG/L (ref 0–5.1)
EKG ATRIAL RATE: 84 BPM
EKG DIAGNOSIS: NORMAL
EKG P AXIS: 49 DEGREES
EKG P-R INTERVAL: 240 MS
EKG Q-T INTERVAL: 382 MS
EKG QRS DURATION: 94 MS
EKG QTC CALCULATION (BAZETT): 451 MS
EKG R AXIS: 49 DEGREES
EKG T AXIS: 20 DEGREES
EKG VENTRICULAR RATE: 84 BPM
ERYTHROCYTE [SEDIMENTATION RATE] IN BLOOD BY WESTERGREN METHOD: 49 MM/HR (ref 0–20)
GFR SERPLBLD CREATININE-BSD FMLA CKD-EPI: 53 ML/MIN/{1.73_M2}
GLUCOSE BLD-MCNC: 175 MG/DL (ref 70–99)
GLUCOSE BLD-MCNC: 213 MG/DL (ref 70–99)
GLUCOSE BLD-MCNC: 238 MG/DL (ref 70–99)
GLUCOSE BLD-MCNC: 248 MG/DL (ref 70–99)
GLUCOSE SERPL-MCNC: 179 MG/DL (ref 70–99)
INR PPP: 1.19 (ref 0.85–1.15)
PERFORMED ON: ABNORMAL
POTASSIUM SERPL-SCNC: 4.8 MMOL/L (ref 3.5–5.1)
PROTHROMBIN TIME: 15.3 SEC (ref 11.9–14.9)
SODIUM SERPL-SCNC: 138 MMOL/L (ref 136–145)

## 2025-02-02 PROCEDURE — 93010 ELECTROCARDIOGRAM REPORT: CPT | Performed by: INTERNAL MEDICINE

## 2025-02-02 PROCEDURE — 85610 PROTHROMBIN TIME: CPT

## 2025-02-02 PROCEDURE — 80048 BASIC METABOLIC PNL TOTAL CA: CPT

## 2025-02-02 PROCEDURE — 1200000000 HC SEMI PRIVATE

## 2025-02-02 PROCEDURE — 36415 COLL VENOUS BLD VENIPUNCTURE: CPT

## 2025-02-02 PROCEDURE — 6360000002 HC RX W HCPCS: Performed by: INTERNAL MEDICINE

## 2025-02-02 PROCEDURE — 97530 THERAPEUTIC ACTIVITIES: CPT

## 2025-02-02 PROCEDURE — 6370000000 HC RX 637 (ALT 250 FOR IP)

## 2025-02-02 PROCEDURE — 97165 OT EVAL LOW COMPLEX 30 MIN: CPT

## 2025-02-02 PROCEDURE — 2500000003 HC RX 250 WO HCPCS: Performed by: INTERNAL MEDICINE

## 2025-02-02 PROCEDURE — 6370000000 HC RX 637 (ALT 250 FOR IP): Performed by: INTERNAL MEDICINE

## 2025-02-02 PROCEDURE — 85652 RBC SED RATE AUTOMATED: CPT

## 2025-02-02 PROCEDURE — 86140 C-REACTIVE PROTEIN: CPT

## 2025-02-02 PROCEDURE — 97162 PT EVAL MOD COMPLEX 30 MIN: CPT

## 2025-02-02 PROCEDURE — 97116 GAIT TRAINING THERAPY: CPT

## 2025-02-02 PROCEDURE — 99223 1ST HOSP IP/OBS HIGH 75: CPT | Performed by: INTERNAL MEDICINE

## 2025-02-02 PROCEDURE — 93005 ELECTROCARDIOGRAM TRACING: CPT

## 2025-02-02 RX ADMIN — SODIUM CHLORIDE, PRESERVATIVE FREE 5 ML: 5 INJECTION INTRAVENOUS at 09:25

## 2025-02-02 RX ADMIN — ROSUVASTATIN CALCIUM 20 MG: 20 TABLET, FILM COATED ORAL at 20:16

## 2025-02-02 RX ADMIN — INSULIN LISPRO 1 UNITS: 100 INJECTION, SOLUTION INTRAVENOUS; SUBCUTANEOUS at 12:26

## 2025-02-02 RX ADMIN — WATER 2000 MG: 1 INJECTION INTRAMUSCULAR; INTRAVENOUS; SUBCUTANEOUS at 15:36

## 2025-02-02 RX ADMIN — SODIUM CHLORIDE, PRESERVATIVE FREE 10 ML: 5 INJECTION INTRAVENOUS at 20:15

## 2025-02-02 RX ADMIN — OXYCODONE HYDROCHLORIDE AND ACETAMINOPHEN 2 TABLET: 5; 325 TABLET ORAL at 20:16

## 2025-02-02 RX ADMIN — LOSARTAN POTASSIUM 25 MG: 50 TABLET, FILM COATED ORAL at 09:16

## 2025-02-02 RX ADMIN — VANCOMYCIN 1500 MG: 1.5 INJECTION, SOLUTION INTRAVENOUS at 18:03

## 2025-02-02 ASSESSMENT — PAIN SCALES - GENERAL
PAINLEVEL_OUTOF10: 8
PAINLEVEL_OUTOF10: 0

## 2025-02-02 ASSESSMENT — PAIN - FUNCTIONAL ASSESSMENT
PAIN_FUNCTIONAL_ASSESSMENT: 0-10
PAIN_FUNCTIONAL_ASSESSMENT: ACTIVITIES ARE NOT PREVENTED
PAIN_FUNCTIONAL_ASSESSMENT: 0-10

## 2025-02-02 ASSESSMENT — PAIN DESCRIPTION - DESCRIPTORS: DESCRIPTORS: ACHING;DISCOMFORT

## 2025-02-02 ASSESSMENT — PAIN DESCRIPTION - ORIENTATION: ORIENTATION: RIGHT

## 2025-02-02 ASSESSMENT — PAIN DESCRIPTION - LOCATION: LOCATION: GENERALIZED

## 2025-02-02 NOTE — PROGRESS NOTES
Physical Therapy  Facility/Department: [address]  Physical Therapy Initial Assessment    Name: Bhaskar Jean-Baptiste  : 1950  MRN: [id number]  Date of Service: 2025    Discharge Recommendations:  Home with assist PRN, Home with Home health PT   PT Equipment Recommendations  Equipment Needed: Yes  Mobility Devices: Walker  Walker: Rolling    HOME HEALTH CARE: LEVEL 1 STANDARD    - Initial home health evaluation to occur within 24-48 hours, in patient home   - Therapy to evaluate with goal of regaining prior level of functioning   - Therapy to evaluate if patient has Home Health Aide needs for personal care      Patient Diagnosis(es): The primary encounter diagnosis was PAD (peripheral artery disease) (HCC). A diagnosis of Diabetic foot infection (HCC) was also pertinent to this visit.  Past Medical History:  has a past medical history of CAD (coronary artery disease), CKD (chronic kidney disease) stage 3, GFR 30-59 ml/min (HCC), Diabetes mellitus (HCC), HLD (hyperlipidemia), HTN (hypertension), Hyperlipidemia, Hypertension, Ischemic stroke (HCC), and MI (myocardial infarction) ([facility]).  Past Surgical History:  has a past surgical history that includes Hand surgery (Left); Foot surgery (Left, 2022); Ankle surgery (Left, 2022); Foot Debridement (Left, 2022); Foot Debridement (Left, 2023); invasive vascular (N/A, 2024); invasive vascular (N/A, 2024); Colonoscopy; hernia repair; Toe amputation (Right, 2025); and Foot Debridement (Right, 2025).    Assessment  Body Structures, Functions, Activity Limitations Requiring Skilled Therapeutic Intervention: Decreased functional mobility ;Decreased strength;Decreased endurance;Decreased balance  Assessment: Patient demonstrates impaired functional mobility, requiring (S) to SBA with RW for standing balance during mobility while wearing right surgical shoe and maintaining heel weight bearing on right LE.  Patient

## 2025-02-02 NOTE — PLAN OF CARE
Problem: ABCDS Injury Assessment  Goal: Absence of physical injury  2/2/2025 1109 by Amanda Moran, RN  Outcome: Progressing  Flowsheets (Taken 2/1/2025 2140 by Hayden Bella, RN)  Absence of Physical Injury: Implement safety measures based on patient assessment     Problem: Pain  Goal: Verbalizes/displays adequate comfort level or baseline comfort level  Recent Flowsheet Documentation  Taken 2/1/2025 2300 by Hayden Bella, RN  Verbalizes/displays adequate comfort level or baseline comfort level:   Encourage patient to monitor pain and request assistance   Assess pain using appropriate pain scale   Administer analgesics based on type and severity of pain and evaluate response      Pt called expressing confusion on dosage she is supposed to take for gabapentin    Pt states she thought at last visit she was told to take 3 per day. Psr advised pt that per the visit notes:  Continue gabapentin 300 mg twice daily  Discussed we could increase it to 3 of these per day if needed      Psr told pt it says 30 mg twice a day at this time (max 600 mg daily) and that's what the most recent script was refilled as. Pt would like to request it be increased to 3 per day, as states 2 isn't enough. Please call pt to discuss the med adjustment. Pt states please leave detailed vm if she doesn't answer.

## 2025-02-02 NOTE — PLAN OF CARE
Problem: ABCDS Injury Assessment  Goal: Absence of physical injury  2/1/2025 2141 by Hayden Bella RN  Outcome: Progressing  Flowsheets (Taken 2/1/2025 2140)  Absence of Physical Injury: Implement safety measures based on patient assessment  2/1/2025 1253 by Amanda Moran RN  Outcome: Progressing  Flowsheets (Taken 2/1/2025 1253)  Absence of Physical Injury: Implement safety measures based on patient assessment     Problem: Pain  Goal: Verbalizes/displays adequate comfort level or baseline comfort level  2/1/2025 2141 by Hayden Bella RN  Outcome: Progressing  Flowsheets (Taken 2/1/2025 1930)  Verbalizes/displays adequate comfort level or baseline comfort level:   Encourage patient to monitor pain and request assistance   Assess pain using appropriate pain scale   Administer analgesics based on type and severity of pain and evaluate response  2/1/2025 1253 by Amanda Moran RN  Outcome: Progressing  Flowsheets (Taken 2/1/2025 1253)  Verbalizes/displays adequate comfort level or baseline comfort level:   Encourage patient to monitor pain and request assistance   Assess pain using appropriate pain scale   Administer analgesics based on type and severity of pain and evaluate response   Implement non-pharmacological measures as appropriate and evaluate response   Notify Licensed Independent Practitioner if interventions unsuccessful or patient reports new pain     Problem: Safety - Adult  Goal: Free from fall injury  Outcome: Progressing  Flowsheets (Taken 2/1/2025 2140)  Free From Fall Injury: Instruct family/caregiver on patient safety     Problem: Chronic Conditions and Co-morbidities  Goal: Patient's chronic conditions and co-morbidity symptoms are monitored and maintained or improved  Outcome: Progressing     Problem: Discharge Planning  Goal: Discharge to home or other facility with appropriate resources  Outcome: Progressing

## 2025-02-02 NOTE — PROGRESS NOTES
Podiatric Surgery Daily Progress Note  Bhaskar Jean-Baptiste      Subjective :   Patient seen and examined this am at the bedside. S/p Right foot I&D with bone biopsy (1/31/25). POD #2. Patient denies any acute overnight events. Patient denies N/V/F/C/SOB. Patient denies calf pain, thigh pain, chest pain.     Review of Systems: A 12 point review of symptoms is unremarkable with the exception of the chief complaint. Patient specifically denies nausea, fever, vomiting, chills, shortness of breath, chest pain, abdominal pain, constipation or difficulty urinating.       Objective     /76   Pulse 65   Temp 98 °F (36.7 °C) (Oral)   Resp 16   Wt 100.9 kg (222 lb 8 oz)   SpO2 95%   BMI 32.86 kg/m²      I/O:  Intake/Output Summary (Last 24 hours) at 2/2/2025 0819  Last data filed at 2/2/2025 0502  Gross per 24 hour   Intake 250 ml   Output 2600 ml   Net -2350 ml              Wt Readings from Last 3 Encounters:   01/29/25 100.9 kg (222 lb 8 oz)   01/17/25 99.3 kg (219 lb)   01/06/25 100.3 kg (221 lb 1 oz)       LABS:    Recent Labs     01/30/25  0828   WBC 11.6*   HGB 11.6*   HCT 35.4*           Recent Labs     02/02/25  0517      K 4.8      CO2 28   BUN 15   CREATININE 1.4*        Recent Labs     01/30/25  1904   INR 1.20*           LOWER EXTREMITY EXAMINATION    Dressing to right LE intact. No strikethrough noted to the external dressing.  Sanguinous drainage noted to the internal layers of the dressing.     VASCULAR: DP and PT pulses are non-palpable 0/4 right LE however are palpable 2 out of 4 to left lower extremity.  Upon Doppler examination DP and PT pulses to the right lower extremity were found to be monophasic.  CFT is brisk to the digits of the foot b/l. Skin temperature is warm to cool from proximal to distal with no focal calor.  +2 pitting edema to right lower extremity however no lower extremity edema noted to left lower extremity. No pain with calf compression b/l.    stenosis of the remainder of the superficial femoral artery and popliteal arteries.  Normal flow velocities through all tibial vessels however imaging is limited by calcific changes.  Clinical correlation necessary    MRI right foot 1/30/2025  IMPRESSION:  1. Acute osteomyelitis involving the second and third metatarsals as well as the second proximal phalanx.  2. Soft tissue surrounding the second MTP joint demonstrate a lack of enhancement compatible with gangrenous changes no definite abscess.  3. Mild tenosynovitis surrounding the FHL.  4. Findings suggestive of a subacute fracture involving the navicular bone, with a small amount of marrow edema.       XR foot 1/31/2025  FINDINGS/IMPRESSION:     Postsurgical changes of distal third ray amputation. There is erosion of the   distal second and third third metatarsals consistent with osteomyelitis   described on recent MRI.. Mild degenerative changes of the midfoot. Mild dorsal   and plantar calcaneal spurring.   ASSESSMENT/PLAN  -S/p Right foot I&D with bone biopsy (1/31/25)  -S/p right third toe amp (DOS 1/6/2025)  -PAD  -Diabetes mellitus type 2 with peripheral neuropathy  -CAD  -History of stroke     -Patient seen and examined at bedside  -VSS, New CBC ordered  -ESR 78->49 and ->152.0  -Images reviewed, impression noted above  -Surgical culture ->NGTD  -Surgical path pending  -ID following; continue vancomycin, ceftriaxone appreciate continued recommendations  -Right lower extremity dressed with quarter inch iodoform packing, dry gauze, Kerlix and Ace  -Patient with postoperative lalit shoe to be worn at all times  -Patient should be partial heel weightbearing to right LE in surgical lalit shoe only  -patient refusing physical therapy at this time.  -Patient require further surgical intervention during this admission consisting of a incision and drainage with delayed primary closure.  Tentative plan for OR Monday afternoon pending surgeon in OR

## 2025-02-02 NOTE — CONSULTS
1/31/2025).     Social History:   reports that he has never smoked. He has never used smokeless tobacco. He reports that he does not currently use alcohol. He reports that he does not use drugs.     Family History:  family history is not on file.     Home Medications:  Prior to Admission medications    Medication Sig Start Date End Date Taking? Authorizing Provider   glipiZIDE (GLUCOTROL XL) 10 MG extended release tablet Take 1 tablet by mouth daily 8/22/24  Yes Rubin Contreras MD   clopidogrel (PLAVIX) 75 MG tablet Take 1 tablet by mouth daily   Yes Rubin Contreras MD   Continuous Blood Gluc Sensor (FREESTYLE TOO 14 DAY SENSOR) [facility] USE EVERY 14 DAYS FOR GLUCOSE MONITORING 12/20/22  Yes Rubin Contreras MD   aspirin 81 MG EC tablet Take 1 tablet by mouth daily   Yes Rubin Contreras MD   metFORMIN (GLUCOPHAGE) 500 MG tablet Take 1 tablet by mouth 2 times daily (with meals) 2 tabs in am and 1 tab po hs   Yes Rubin Contreras MD   losartan (COZAAR) 25 MG tablet Take 1 tablet by mouth daily   Yes Rubin Contreras MD   rosuvastatin (CRESTOR) 20 MG tablet Take 1 tablet by mouth daily   Yes Rubin Contreras MD        Current Medications:  Current Facility-Administered Medications   Medication Dose Route Frequency Provider Last Rate Last Admin    phenol 1.4 % mouth spray 1 spray  1 spray Mouth/Throat PRN Sin Fairchild MD   1 spray at 01/31/25 0934    oxyCODONE-acetaminophen (PERCOCET) 5-325 MG per tablet 1 tablet  1 tablet Oral Q4H PRN Abdirahman Sharp DPM        Or    oxyCODONE-acetaminophen (PERCOCET) 5-325 MG per tablet 2 tablet  2 tablet Oral Q4H PRN Abdirahman Sharp DPM   2 tablet at 02/01/25 2001    cefTRIAXone (ROCEPHIN) 2,000 mg in sterile water 20 mL IV syringe  2,000 mg IntraVENous Q24H Major Spears MD   2,000 mg at 02/01/25 1620    calcium carbonate (TUMS) chewable tablet 500 mg  500 mg Oral TID PRN Myranda Shay APRN - CNP   500 mg at 01/30/25  Noted  NEUROLOGICAL:  . Moves all extremities to command. Cranial nerves 2-12 are in tact.  PSYCHIATRIC: alert and lucid  and oriented and appropriate  SKIN: No lesions or rashes  LYMPH NODES: none enlarged          CBC with Differential:    Lab Results   Component Value Date/Time    WBC 11.6 01/30/2025 08:28 AM    RBC 4.01 01/30/2025 08:28 AM    HGB 11.6 01/30/2025 08:28 AM    HCT 35.4 01/30/2025 08:28 AM     01/30/2025 08:28 AM    MCV 88.2 01/30/2025 08:28 AM    MCH 29.0 01/30/2025 08:28 AM    MCHC 32.9 01/30/2025 08:28 AM    RDW 14.1 01/30/2025 08:28 AM    LYMPHOPCT 8.0 01/30/2025 08:28 AM    MONOPCT 10.8 01/30/2025 08:28 AM    EOSPCT 1.7 01/30/2025 08:28 AM    BASOPCT 0.4 01/30/2025 08:28 AM    MONOSABS 1.3 01/30/2025 08:28 AM    LYMPHSABS 0.9 01/30/2025 08:28 AM    EOSABS 0.2 01/30/2025 08:28 AM    BASOSABS 0.0 01/30/2025 08:28 AM     BMP:    Lab Results   Component Value Date/Time     02/02/2025 05:17 AM    K 4.8 02/02/2025 05:17 AM    K 4.1 01/29/2025 04:01 PM     02/02/2025 05:17 AM    CO2 28 02/02/2025 05:17 AM    BUN 15 02/02/2025 05:17 AM    CREATININE 1.4 02/02/2025 05:17 AM    CALCIUM 9.6 02/02/2025 05:17 AM    LABGLOM 53 02/02/2025 05:17 AM    LABGLOM 59 01/05/2023 01:10 PM     Uric Acid:  No components found for: \"URIC\"  PT/INR:    Lab Results   Component Value Date/Time    PROTIME 15.3 02/02/2025 09:41 AM    INR 1.19 02/02/2025 09:41 AM     Troponin:  No results found for: \"CKTOTAL\", \"CKMB\", \"CKMBINDEX\", \"TROPONINI\", \"TROPHS\"   FLP:  No results found for: \"CHOL\", \"TRIG\", \"HDL\", \"LDL\", \"VLDL\", \"CHOLHDLRATIO\"     EKG: EKG on 2/2/2025 sinus rhythm 84/min.  Old inferior wall infarct.  Late transition.  First-degree AV block..  echocardiogram pending  Assessment/ Plan     Patient Active Problem List    Diagnosis Date Noted    Type 2 diabetes mellitus with left diabetic foot ulcer (HCC) 11/21/2022    Preoperative cardiovascular examination 01/30/2025    Wound infection 01/29/2025    PAD

## 2025-02-02 NOTE — PROGRESS NOTES
[facility] -  Clinical Pharmacy Note    Vancomycin - Management by Pharmacy    Consult Date(s): 01/29/2025  Consulting Provider(s): Dr. Nabeel Alvarado    Assessment / Plan  1)  Rt diabetic foot infection - Vancomycin  Concurrent Antimicrobials: Rocephin  Day of Vanc Therapy / Ordered Duration: 5 of TBD  Current Dosing Method: Bayesian-Guided AUC Dosing  Therapeutic Goal: -600 mg/L*hr  Current Dose / Plan:   Hx of CKD - baseline appears to be ~1.5.  SCr remains at 1.4 today.  Currently on 1500mg IV q24h.  Regimen predicts AUCss = 568 with troughss = 15.8 mcg/mL.  Plan: Continue same regimen and obtain another random level in ~2 days to reassess kinetics unless clinically indicated to obtain earlier  Will continue to monitor clinical condition and make adjustments to regimen as appropriate.    Please call with questions--  Thanks--  Manas Cotton, PharmD  PGY1 Pharmacy Resident   Wireless: [phone]  02/02/25       Interval update: NAEON. Blood glucose better controlled this AM. Patient to go back to OR next week (tentatively Monday afternoon pending OR availability) for closure.     Subjective/Objective:   Bhaskar Jean-Baptiste is a 74 y.o. male with a PMHx significant for PAD, T2DM, CAD, HLD, CKD3, CVA who presented with R foot wound infection. Admitted with R foot wound infection, suspected osteomyelitis.     Pharmacy is consulted to dose Vancomycin.    Ht Readings from Last 1 Encounters:   01/17/25 1.753 m (5' 9\")     Wt Readings from Last 1 Encounters:   01/29/25 100.9 kg (222 lb 8 oz)     Current & Prior Antimicrobial Regimen(s):  Zosyn (01/29- Current)  Vancomycin- Pharmacy to dose  2500 mg IV x1 (01/29)  1500mg IV q24h (1/30-current)    Vancomycin Level(s) / Doses:    Date Time Dose Type of Level / Level Interpretation   1/31 05:02 1500mg q24h Random = 20.4 mcg/mL Drawn ~12.5h after 2nd total dose  AUC = 561; ssTr = 15.5  Continue same regimen          Note: Serum levels collected for AUC-based

## 2025-02-02 NOTE — PROGRESS NOTES
[facility] Medicine Progress Note  V 1.6      Date of Admission: 1/29/2025    Hospital Day: 5      Chief Admission Complaint: Right foot swelling    Subjective: Upset at times- states not getting good care- upset about bruising from blood draws-.;  Concerned about a new spot on his right foot adjacent to the fifth toe pictures in chart    Presenting Admission History:       Bhaskar Jean-Baptiste is a 74 y.o. 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/2025    -Pharmacy consult for vancomycin dosing  -Continue on Zosyn Q8 hourly  -Partial weightbearing on the right  -Pain relief as needed  -No Lovenox for DVT prophylaxis and SCDs not used given PAD and nonpalpable pulses  -Infectious disease continued with antibiotics with vancomycin and dcZosyn-this was changed to ceftriaxone per ID  Patient was seen by cardiologist-cleared for surgical intervention   S/p I&D on 1/31/25  PT OT evaluation is pending-partial weightbearing with heel touch to all RLE-patient will need to return to the OR once wound has been converted from dirty to clean for repeat id;  Tentative OR on Monday     #History of PAD and nonpalpable pulses at the outpatient podiatry appointment  -History of revascularization procedure on 12/6/2024       #DM-2  -Hold glipizide and metformin  -Start on low-dose SSI with hypoglycemia protocol  -pending A1c     #CAD/CVA  -Hold aspirin and Plavix  -Continue on statins     #Essential hypertension  -Continue on losartan home doses     #CKD stage III  -Renal function stable at baseline     #Chronic anemia  -H&H stable

## 2025-02-03 ENCOUNTER — ANESTHESIA (OUTPATIENT)
Dept: OPERATING ROOM | Age: 75
DRG: 857 | End: 2025-02-03
Payer: MEDICARE

## 2025-02-03 ENCOUNTER — ANESTHESIA EVENT (OUTPATIENT)
Dept: OPERATING ROOM | Age: 75
DRG: 857 | End: 2025-02-03
Payer: MEDICARE

## 2025-02-03 PROBLEM — N18.9 CHRONIC KIDNEY DISEASE: Status: ACTIVE | Noted: 2025-02-03

## 2025-02-03 PROBLEM — Z88.0 PENICILLIN ALLERGY: Status: ACTIVE | Noted: 2025-02-03

## 2025-02-03 PROBLEM — M86.9 OSTEOMYELITIS OF RIGHT FOOT (HCC): Status: ACTIVE | Noted: 2025-02-03

## 2025-02-03 PROBLEM — E11.69 TYPE 2 DIABETES MELLITUS WITH OTHER SPECIFIED COMPLICATION (HCC): Status: ACTIVE | Noted: 2025-02-03

## 2025-02-03 LAB
ANION GAP SERPL CALCULATED.3IONS-SCNC: 12 MMOL/L (ref 3–16)
BASOPHILS # BLD: 0 K/UL (ref 0–0.2)
BASOPHILS NFR BLD: 0.4 %
BUN SERPL-MCNC: 15 MG/DL (ref 7–20)
CALCIUM SERPL-MCNC: 9.7 MG/DL (ref 8.3–10.6)
CHLORIDE SERPL-SCNC: 100 MMOL/L (ref 99–110)
CK SERPL-CCNC: 47 U/L (ref 39–308)
CO2 SERPL-SCNC: 27 MMOL/L (ref 21–32)
CREAT SERPL-MCNC: 1.2 MG/DL (ref 0.8–1.3)
DEPRECATED RDW RBC AUTO: 14 % (ref 12.4–15.4)
EOSINOPHIL # BLD: 0.3 K/UL (ref 0–0.6)
EOSINOPHIL NFR BLD: 2.4 %
GFR SERPLBLD CREATININE-BSD FMLA CKD-EPI: 64 ML/MIN/{1.73_M2}
GLUCOSE BLD-MCNC: 182 MG/DL (ref 70–99)
GLUCOSE BLD-MCNC: 190 MG/DL (ref 70–99)
GLUCOSE BLD-MCNC: 191 MG/DL (ref 70–99)
GLUCOSE BLD-MCNC: 197 MG/DL (ref 70–99)
GLUCOSE BLD-MCNC: 224 MG/DL (ref 70–99)
GLUCOSE SERPL-MCNC: 218 MG/DL (ref 70–99)
HCT VFR BLD AUTO: 37.9 % (ref 40.5–52.5)
HGB BLD-MCNC: 12.6 G/DL (ref 13.5–17.5)
LYMPHOCYTES # BLD: 1.3 K/UL (ref 1–5.1)
LYMPHOCYTES NFR BLD: 11.2 %
MCH RBC QN AUTO: 29.2 PG (ref 26–34)
MCHC RBC AUTO-ENTMCNC: 33.2 G/DL (ref 31–36)
MCV RBC AUTO: 88 FL (ref 80–100)
MONOCYTES # BLD: 1.1 K/UL (ref 0–1.3)
MONOCYTES NFR BLD: 9 %
NEUTROPHILS # BLD: 9.1 K/UL (ref 1.7–7.7)
NEUTROPHILS NFR BLD: 77 %
PERFORMED ON: ABNORMAL
PLATELET # BLD AUTO: 366 K/UL (ref 135–450)
PMV BLD AUTO: 7.7 FL (ref 5–10.5)
POTASSIUM SERPL-SCNC: 4.5 MMOL/L (ref 3.5–5.1)
RBC # BLD AUTO: 4.31 M/UL (ref 4.2–5.9)
SODIUM SERPL-SCNC: 139 MMOL/L (ref 136–145)
VANCOMYCIN SERPL-MCNC: 16.7 UG/ML
WBC # BLD AUTO: 11.9 K/UL (ref 4–11)

## 2025-02-03 PROCEDURE — 6360000002 HC RX W HCPCS: Performed by: ANESTHESIOLOGY

## 2025-02-03 PROCEDURE — 85025 COMPLETE CBC W/AUTO DIFF WBC: CPT

## 2025-02-03 PROCEDURE — 6360000002 HC RX W HCPCS: Performed by: INTERNAL MEDICINE

## 2025-02-03 PROCEDURE — 2500000003 HC RX 250 WO HCPCS

## 2025-02-03 PROCEDURE — 6360000002 HC RX W HCPCS: Performed by: NURSE ANESTHETIST, CERTIFIED REGISTERED

## 2025-02-03 PROCEDURE — 6370000000 HC RX 637 (ALT 250 FOR IP): Performed by: INTERNAL MEDICINE

## 2025-02-03 PROCEDURE — 82550 ASSAY OF CK (CPK): CPT

## 2025-02-03 PROCEDURE — 7100000001 HC PACU RECOVERY - ADDTL 15 MIN: Performed by: PODIATRIST

## 2025-02-03 PROCEDURE — 3700000001 HC ADD 15 MINUTES (ANESTHESIA): Performed by: PODIATRIST

## 2025-02-03 PROCEDURE — 0KBV0ZZ EXCISION OF RIGHT FOOT MUSCLE, OPEN APPROACH: ICD-10-PCS | Performed by: PODIATRIST

## 2025-02-03 PROCEDURE — 2500000003 HC RX 250 WO HCPCS: Performed by: PODIATRIST

## 2025-02-03 PROCEDURE — 6370000000 HC RX 637 (ALT 250 FOR IP)

## 2025-02-03 PROCEDURE — 3600000004 HC SURGERY LEVEL 4 BASE: Performed by: PODIATRIST

## 2025-02-03 PROCEDURE — 2500000003 HC RX 250 WO HCPCS: Performed by: INTERNAL MEDICINE

## 2025-02-03 PROCEDURE — 36415 COLL VENOUS BLD VENIPUNCTURE: CPT

## 2025-02-03 PROCEDURE — 80048 BASIC METABOLIC PNL TOTAL CA: CPT

## 2025-02-03 PROCEDURE — 6360000002 HC RX W HCPCS

## 2025-02-03 PROCEDURE — 99233 SBSQ HOSP IP/OBS HIGH 50: CPT | Performed by: INTERNAL MEDICINE

## 2025-02-03 PROCEDURE — 3600000014 HC SURGERY LEVEL 4 ADDTL 15MIN: Performed by: PODIATRIST

## 2025-02-03 PROCEDURE — 80202 ASSAY OF VANCOMYCIN: CPT

## 2025-02-03 PROCEDURE — 7100000000 HC PACU RECOVERY - FIRST 15 MIN: Performed by: PODIATRIST

## 2025-02-03 PROCEDURE — 3700000000 HC ANESTHESIA ATTENDED CARE: Performed by: PODIATRIST

## 2025-02-03 PROCEDURE — 99232 SBSQ HOSP IP/OBS MODERATE 35: CPT | Performed by: INTERNAL MEDICINE

## 2025-02-03 PROCEDURE — 2709999900 HC NON-CHARGEABLE SUPPLY: Performed by: PODIATRIST

## 2025-02-03 PROCEDURE — 6360000002 HC RX W HCPCS: Performed by: PODIATRIST

## 2025-02-03 PROCEDURE — 2580000003 HC RX 258: Performed by: INTERNAL MEDICINE

## 2025-02-03 PROCEDURE — 1200000000 HC SEMI PRIVATE

## 2025-02-03 PROCEDURE — 2580000003 HC RX 258: Performed by: NURSE ANESTHETIST, CERTIFIED REGISTERED

## 2025-02-03 RX ORDER — SODIUM CHLORIDE, SODIUM LACTATE, POTASSIUM CHLORIDE, CALCIUM CHLORIDE 600; 310; 30; 20 MG/100ML; MG/100ML; MG/100ML; MG/100ML
INJECTION, SOLUTION INTRAVENOUS
Status: DISCONTINUED | OUTPATIENT
Start: 2025-02-03 | End: 2025-02-03 | Stop reason: SDUPTHER

## 2025-02-03 RX ORDER — HYDROMORPHONE HYDROCHLORIDE 1 MG/ML
0.5 INJECTION, SOLUTION INTRAMUSCULAR; INTRAVENOUS; SUBCUTANEOUS EVERY 5 MIN PRN
Status: DISCONTINUED | OUTPATIENT
Start: 2025-02-03 | End: 2025-02-03 | Stop reason: HOSPADM

## 2025-02-03 RX ORDER — SODIUM CHLORIDE 9 MG/ML
INJECTION, SOLUTION INTRAVENOUS PRN
Status: DISCONTINUED | OUTPATIENT
Start: 2025-02-03 | End: 2025-02-05 | Stop reason: HOSPADM

## 2025-02-03 RX ORDER — ASPIRIN 81 MG/1
81 TABLET ORAL DAILY
Status: DISCONTINUED | OUTPATIENT
Start: 2025-02-04 | End: 2025-02-05 | Stop reason: HOSPADM

## 2025-02-03 RX ORDER — HYDROMORPHONE HYDROCHLORIDE 1 MG/ML
0.5 INJECTION, SOLUTION INTRAMUSCULAR; INTRAVENOUS; SUBCUTANEOUS EVERY 10 MIN PRN
Status: DISCONTINUED | OUTPATIENT
Start: 2025-02-03 | End: 2025-02-03 | Stop reason: HOSPADM

## 2025-02-03 RX ORDER — LIDOCAINE HYDROCHLORIDE 10 MG/ML
50 INJECTION, SOLUTION EPIDURAL; INFILTRATION; INTRACAUDAL; PERINEURAL ONCE
Status: COMPLETED | OUTPATIENT
Start: 2025-02-03 | End: 2025-02-05

## 2025-02-03 RX ORDER — PROPOFOL 10 MG/ML
INJECTION, EMULSION INTRAVENOUS
Status: DISCONTINUED | OUTPATIENT
Start: 2025-02-03 | End: 2025-02-03 | Stop reason: SDUPTHER

## 2025-02-03 RX ORDER — MEPERIDINE HYDROCHLORIDE 25 MG/ML
12.5 INJECTION INTRAMUSCULAR; INTRAVENOUS; SUBCUTANEOUS EVERY 5 MIN PRN
Status: DISCONTINUED | OUTPATIENT
Start: 2025-02-03 | End: 2025-02-03 | Stop reason: HOSPADM

## 2025-02-03 RX ORDER — METOCLOPRAMIDE HYDROCHLORIDE 5 MG/ML
10 INJECTION INTRAMUSCULAR; INTRAVENOUS
Status: DISCONTINUED | OUTPATIENT
Start: 2025-02-03 | End: 2025-02-03 | Stop reason: HOSPADM

## 2025-02-03 RX ORDER — SODIUM CHLORIDE 0.9 % (FLUSH) 0.9 %
5-40 SYRINGE (ML) INJECTION EVERY 12 HOURS SCHEDULED
Status: DISCONTINUED | OUTPATIENT
Start: 2025-02-03 | End: 2025-02-03 | Stop reason: HOSPADM

## 2025-02-03 RX ORDER — MAGNESIUM HYDROXIDE 1200 MG/15ML
LIQUID ORAL CONTINUOUS PRN
Status: COMPLETED | OUTPATIENT
Start: 2025-02-03 | End: 2025-02-03

## 2025-02-03 RX ORDER — CLOPIDOGREL BISULFATE 75 MG/1
75 TABLET ORAL DAILY
Status: DISCONTINUED | OUTPATIENT
Start: 2025-02-04 | End: 2025-02-05 | Stop reason: HOSPADM

## 2025-02-03 RX ORDER — SODIUM CHLORIDE 0.9 % (FLUSH) 0.9 %
5-40 SYRINGE (ML) INJECTION PRN
Status: DISCONTINUED | OUTPATIENT
Start: 2025-02-03 | End: 2025-02-03 | Stop reason: HOSPADM

## 2025-02-03 RX ORDER — SODIUM CHLORIDE 9 MG/ML
INJECTION, SOLUTION INTRAVENOUS PRN
Status: DISCONTINUED | OUTPATIENT
Start: 2025-02-03 | End: 2025-02-03 | Stop reason: HOSPADM

## 2025-02-03 RX ORDER — LIDOCAINE HYDROCHLORIDE 20 MG/ML
INJECTION, SOLUTION INTRAVENOUS
Status: DISCONTINUED | OUTPATIENT
Start: 2025-02-03 | End: 2025-02-03 | Stop reason: SDUPTHER

## 2025-02-03 RX ORDER — LIDOCAINE HYDROCHLORIDE 10 MG/ML
INJECTION, SOLUTION EPIDURAL; INFILTRATION; INTRACAUDAL; PERINEURAL PRN
Status: DISCONTINUED | OUTPATIENT
Start: 2025-02-03 | End: 2025-02-03 | Stop reason: ALTCHOICE

## 2025-02-03 RX ORDER — SODIUM CHLORIDE 0.9 % (FLUSH) 0.9 %
5-40 SYRINGE (ML) INJECTION PRN
Status: DISCONTINUED | OUTPATIENT
Start: 2025-02-03 | End: 2025-02-05 | Stop reason: HOSPADM

## 2025-02-03 RX ORDER — NALOXONE HYDROCHLORIDE 0.4 MG/ML
INJECTION, SOLUTION INTRAMUSCULAR; INTRAVENOUS; SUBCUTANEOUS PRN
Status: DISCONTINUED | OUTPATIENT
Start: 2025-02-03 | End: 2025-02-03 | Stop reason: HOSPADM

## 2025-02-03 RX ORDER — FENTANYL CITRATE 50 UG/ML
INJECTION, SOLUTION INTRAMUSCULAR; INTRAVENOUS
Status: DISCONTINUED | OUTPATIENT
Start: 2025-02-03 | End: 2025-02-03 | Stop reason: SDUPTHER

## 2025-02-03 RX ORDER — BUPIVACAINE HYDROCHLORIDE 5 MG/ML
INJECTION, SOLUTION EPIDURAL; INTRACAUDAL PRN
Status: DISCONTINUED | OUTPATIENT
Start: 2025-02-03 | End: 2025-02-03 | Stop reason: ALTCHOICE

## 2025-02-03 RX ORDER — HYDRALAZINE HYDROCHLORIDE 20 MG/ML
10 INJECTION INTRAMUSCULAR; INTRAVENOUS
Status: DISCONTINUED | OUTPATIENT
Start: 2025-02-03 | End: 2025-02-03 | Stop reason: HOSPADM

## 2025-02-03 RX ORDER — SODIUM CHLORIDE 0.9 % (FLUSH) 0.9 %
5-40 SYRINGE (ML) INJECTION EVERY 12 HOURS SCHEDULED
Status: DISCONTINUED | OUTPATIENT
Start: 2025-02-03 | End: 2025-02-05 | Stop reason: HOSPADM

## 2025-02-03 RX ORDER — DIPHENHYDRAMINE HYDROCHLORIDE 50 MG/ML
12.5 INJECTION INTRAMUSCULAR; INTRAVENOUS
Status: DISCONTINUED | OUTPATIENT
Start: 2025-02-03 | End: 2025-02-03 | Stop reason: HOSPADM

## 2025-02-03 RX ORDER — LABETALOL HYDROCHLORIDE 5 MG/ML
10 INJECTION, SOLUTION INTRAVENOUS
Status: DISCONTINUED | OUTPATIENT
Start: 2025-02-03 | End: 2025-02-03 | Stop reason: HOSPADM

## 2025-02-03 RX ADMIN — INSULIN LISPRO 1 UNITS: 100 INJECTION, SOLUTION INTRAVENOUS; SUBCUTANEOUS at 20:29

## 2025-02-03 RX ADMIN — HYDROMORPHONE HYDROCHLORIDE 0.5 MG: 1 INJECTION, SOLUTION INTRAMUSCULAR; INTRAVENOUS; SUBCUTANEOUS at 15:08

## 2025-02-03 RX ADMIN — LOSARTAN POTASSIUM 25 MG: 50 TABLET, FILM COATED ORAL at 08:33

## 2025-02-03 RX ADMIN — LIDOCAINE HYDROCHLORIDE 100 MG: 20 INJECTION, SOLUTION INTRAVENOUS at 13:50

## 2025-02-03 RX ADMIN — ROSUVASTATIN CALCIUM 20 MG: 20 TABLET, FILM COATED ORAL at 20:29

## 2025-02-03 RX ADMIN — SODIUM CHLORIDE 500 MG: 9 INJECTION INTRAMUSCULAR; INTRAVENOUS; SUBCUTANEOUS at 14:21

## 2025-02-03 RX ADMIN — FENTANYL CITRATE 50 MCG: 50 INJECTION, SOLUTION INTRAMUSCULAR; INTRAVENOUS at 13:49

## 2025-02-03 RX ADMIN — OXYCODONE HYDROCHLORIDE AND ACETAMINOPHEN 2 TABLET: 5; 325 TABLET ORAL at 16:36

## 2025-02-03 RX ADMIN — VANCOMYCIN 1500 MG: 1.5 INJECTION, SOLUTION INTRAVENOUS at 18:08

## 2025-02-03 RX ADMIN — OXYCODONE HYDROCHLORIDE AND ACETAMINOPHEN 2 TABLET: 5; 325 TABLET ORAL at 23:58

## 2025-02-03 RX ADMIN — SODIUM CHLORIDE, SODIUM LACTATE, POTASSIUM CHLORIDE, AND CALCIUM CHLORIDE: .6; .31; .03; .02 INJECTION, SOLUTION INTRAVENOUS at 13:45

## 2025-02-03 RX ADMIN — OXYCODONE HYDROCHLORIDE AND ACETAMINOPHEN 2 TABLET: 5; 325 TABLET ORAL at 20:29

## 2025-02-03 RX ADMIN — FENTANYL CITRATE 50 MCG: 50 INJECTION, SOLUTION INTRAMUSCULAR; INTRAVENOUS at 13:57

## 2025-02-03 RX ADMIN — PROPOFOL 200 MG: 10 INJECTION, EMULSION INTRAVENOUS at 13:51

## 2025-02-03 RX ADMIN — SODIUM CHLORIDE, PRESERVATIVE FREE 10 ML: 5 INJECTION INTRAVENOUS at 20:30

## 2025-02-03 RX ADMIN — SODIUM CHLORIDE, PRESERVATIVE FREE 10 ML: 5 INJECTION INTRAVENOUS at 08:34

## 2025-02-03 RX ADMIN — HYDROMORPHONE HYDROCHLORIDE 0.5 MG: 1 INJECTION, SOLUTION INTRAMUSCULAR; INTRAVENOUS; SUBCUTANEOUS at 15:23

## 2025-02-03 RX ADMIN — PHENYLEPHRINE HYDROCHLORIDE 100 MCG: 10 INJECTION, SOLUTION INTRAVENOUS at 13:58

## 2025-02-03 RX ADMIN — FENTANYL CITRATE 50 MCG: 50 INJECTION, SOLUTION INTRAMUSCULAR; INTRAVENOUS at 14:07

## 2025-02-03 RX ADMIN — ONDANSETRON 4 MG: 2 INJECTION INTRAMUSCULAR; INTRAVENOUS at 13:50

## 2025-02-03 ASSESSMENT — PAIN DESCRIPTION - DESCRIPTORS
DESCRIPTORS: NAGGING
DESCRIPTORS: DISCOMFORT
DESCRIPTORS: DISCOMFORT;THROBBING;SHARP
DESCRIPTORS: ACHING
DESCRIPTORS: ACHING
DESCRIPTORS: THROBBING

## 2025-02-03 ASSESSMENT — PAIN DESCRIPTION - LOCATION
LOCATION: LEG;FOOT
LOCATION: FOOT

## 2025-02-03 ASSESSMENT — PAIN DESCRIPTION - ORIENTATION
ORIENTATION: RIGHT

## 2025-02-03 ASSESSMENT — PAIN DESCRIPTION - PAIN TYPE
TYPE: SURGICAL PAIN

## 2025-02-03 ASSESSMENT — PAIN SCALES - GENERAL
PAINLEVEL_OUTOF10: 8
PAINLEVEL_OUTOF10: 5
PAINLEVEL_OUTOF10: 0
PAINLEVEL_OUTOF10: 9
PAINLEVEL_OUTOF10: 8

## 2025-02-03 ASSESSMENT — PAIN - FUNCTIONAL ASSESSMENT: PAIN_FUNCTIONAL_ASSESSMENT: PREVENTS OR INTERFERES SOME ACTIVE ACTIVITIES AND ADLS

## 2025-02-03 ASSESSMENT — PAIN DESCRIPTION - ONSET
ONSET: ON-GOING
ONSET: ON-GOING
ONSET: PROGRESSIVE

## 2025-02-03 ASSESSMENT — PAIN DESCRIPTION - FREQUENCY
FREQUENCY: CONTINUOUS

## 2025-02-03 NOTE — PLAN OF CARE
Problem: ABCDS Injury Assessment  Goal: Absence of physical injury  Outcome: Progressing  Absence of Physical Injury: Implement safety measures based on patient assessment     Problem: Pain  Goal: Verbalizes/displays adequate comfort level or baseline comfort level  Outcome: Progressing     Problem: Safety - Adult  Goal: Free from fall injury  Outcome: Progressing  Free From Fall Injury:   Instruct family/caregiver on patient safety   Based on caregiver fall risk screen, instruct family/caregiver to ask for assistance with transferring infant if caregiver noted to have fall risk factors     Problem: Chronic Conditions and Co-morbidities  Goal: Patient's chronic conditions and co-morbidity symptoms are monitored and maintained or improved  Outcome: Progressing     Problem: Discharge Planning  Goal: Discharge to home or other facility with appropriate resources  Outcome: Progressing

## 2025-02-03 NOTE — PLAN OF CARE
Problem: ABCDS Injury Assessment  Goal: Absence of physical injury  Outcome: Progressing  Flowsheets (Taken 2/1/2025 2140 by Hayden Bella RN)  Absence of Physical Injury: Implement safety measures based on patient assessment     Problem: Chronic Conditions and Co-morbidities  Goal: Patient's chronic conditions and co-morbidity symptoms are monitored and maintained or improved  Outcome: Progressing  Flowsheets (Taken 2/3/2025 7655)  Care Plan - Patient's Chronic Conditions and Co-Morbidity Symptoms are Monitored and Maintained or Improved:   Monitor and assess patient's chronic conditions and comorbid symptoms for stability, deterioration, or improvement   Collaborate with multidisciplinary team to address chronic and comorbid conditions and prevent exacerbation or deterioration   Update acute care plan with appropriate goals if chronic or comorbid symptoms are exacerbated and prevent overall improvement and discharge

## 2025-02-03 NOTE — PROGRESS NOTES
Physician Progress Note      PATIENT:               NIRU POSEY  [facility] #:                  [id number]  :                       1950  ADMIT DATE:       2025 2:41 PM  DISCH DATE:  RESPONDING  PROVIDER #:        ANTONIO YOUNG          QUERY TEXT:    Pt admitted with right foot wound infection and underwent right third toe amp   2025, noted documentation of possible osteomyelitis.? If possible, please   document in progress notes and discharge summary the relationship if any   between the wound infection, osteomyelitis and the right third toe amp   2025:  ?  The medical record reflects the following:  Risk Factors: 73 yo S/P right third toe amp   Clinical Indicators: Per PN ; Right foot wound infection with suspected   osteomyelitis Status post right third toe amputation on 2025  Treatment: MRI, Podiatry, ID consults, pending bone biopsy, IV Zosyn  Options provided:  -- Right foot wound infection, osteomyelitis is due to the right third toe amp   2025  -- Right foot wound infection, osteomyelitis is not due to the right third toe   amp 2025  -- Other - I will add my own diagnosis  -- Disagree - Not applicable / Not valid  -- Disagree - Clinically unable to determine / Unknown  -- Refer to Clinical Documentation Reviewer    PROVIDER RESPONSE TEXT:    Patient has right foot wound infection, osteomyelitis due to the right third   toe amp 2025.    Query created by: Myra Mathew on 2/3/2025 6:14 AM      Electronically signed by:  ANTONIO YOUNG 2/3/2025 10:10 AM

## 2025-02-03 NOTE — PROGRESS NOTES
Physical Therapy  Attempted to see pt for PT.  Pt currently off floor.  Will cont per POC.  Sandhya Ramirez, PT [phone]

## 2025-02-03 NOTE — PROGRESS NOTES
Patient arrived to PACU post INCISION AND DRAINAGE WITH DELAYED PRIMARY CLOSURE RIGHT FOOT - Right with Dr. Acuña. VSS on arrival. CRNA gave PACU RN report at bedside stating no complications during procedure. Dressing to surgical site clean, dry and intact. Patient shows no signs of pain at this time. Will continue to monitor.

## 2025-02-03 NOTE — PROGRESS NOTES
[facility]  Cardiology Daily Progress Note  2/3/2025,12:50 PM      Donald Cronin MD ,[facility], Hilda CRAWFORD MD  Primary cardiologist:    Admit Date:  1/29/2025  Hospital Day: Hospital Day: 6  Subjective:  Mr. Jean-Baptiste is awake and alert and in no distress.  Going for surgery I believe debridement of his right foot  this afternoon..  No chest pains no dysrhythmias.  He is in sinus rhythm at 70/min currently.    CKD creatinine is 1.4  Peripheral vascular disease.  CKD stage III.  History of CVA.  Diabetes mellitus type 2.  Questionable history of CAD although I do not have additional details.  Interval change since last visit  Peripheral vascular disease with right toe amputation January 6, 2025 subsequent foot debridement on the right January 31, 2025    Objective:   BP (!) 142/78   Pulse 72   Temp 98.2 °F (36.8 °C) (Oral)   Resp 16   Wt 100.9 kg (222 lb 8 oz)   SpO2 95%   BMI 32.86 kg/m²     Intake/Output Summary (Last 24 hours) at 2/3/2025 1250  Last data filed at 2/3/2025 0403  Gross per 24 hour   Intake 652.85 ml   Output 1800 ml   Net -1147.15 ml       TELEMETRY: Is awake and alert     Physical Examination:    Vitals:    02/03/25 0000 02/03/25 0353 02/03/25 0836 02/03/25 1226   BP: 138/74 136/89 (!) 146/78 (!) 142/78   Pulse: 75 73 71 72   Resp: 16 16 16 16   Temp: 98.5 °F (36.9 °C) 98.4 °F (36.9 °C) 98.3 °F (36.8 °C) 98.2 °F (36.8 °C)   TempSrc: Oral Oral Oral Oral   SpO2: 98% 97% 94% 95%   Weight:            Constitutional and General Appearance:  Healthy. And alert .  HEENT: eyes and ears intact. No nasal masses  THYROID: not enlarged  LUNGS:  Clear to auscultation and percussion  HEART and VASCULAR:  The apical impulses not displaced  Heart tones are crisp and normal  Cervical veins are not engorged  The carotid upstroke is normal in amplitude and contour without delay or bruit  Peripheral pulses are symmetrical and full  There is no clubbing, cyanosis

## 2025-02-03 NOTE — PROGRESS NOTES
[facility] Medicine Progress Note  V 1.6      Date of Admission: 1/29/2025    Hospital Day: 6      Chief Admission Complaint: Right foot swelling    Subjective: Upset at times- states not getting good care- upset about bruising from blood draws-.;  Concerned about a new spot on his right foot adjacent to the fifth toe pictures in chart    Presenting Admission History:       Bhaskar Jean-Baptiste is a 74 y.o. 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/2025    -Pharmacy consult for vancomycin dosing  -Continue on Zosyn Q8 hourly  -Partial weightbearing on the right  -Pain relief as needed  -No Lovenox for DVT prophylaxis and SCDs not used given PAD and nonpalpable pulses    Patient was seen by cardiologist-cleared for surgical intervention   S/p I&D on 1/31/25  PT OT evaluation is pending-partial weightbearing with heel touch to all RLE-  S/p OR again on 2/3/2025 for closure of the wound.  Per ID patient will need IV daptomycin 500 mg daily until 3/14/2025 for third MT OM   will need PICC line upon dc        #History of PAD and nonpalpable pulses at the outpatient podiatry appointment  -History of revascularization procedure on 12/6/2024       #DM-2  -Hold glipizide and metformin  -Start on low-dose SSI with hypoglycemia protocol  -A1c 6.6     #CAD/CVA  -Hold aspirin and Plavix  -Continue on statins  - cardiology was consulted  Stress test ordered and pending      #Essential hypertension  -Continue on losartan home doses     #CKD stage III  -Renal function stable at baseline     #Chronic anemia  -H&H stable at

## 2025-02-03 NOTE — PROGRESS NOTES
PACU Transfer to Floor Note    Procedure(s):  INCISION AND DRAINAGE WITH DELAYED PRIMARY CLOSURE RIGHT FOOT    Current Allergies: Penicillins, Ace inhibitors, and Pcn [penicillins]    Pt meets criteria as per Sherry Score and ASPAN Standards to transfer to next phase of care.     Recent Labs     02/03/25  1154 02/03/25  1433   POCGLU 190* 224*       Vitals:    02/03/25 1515   BP: 131/74   Pulse: 72   Resp: 19   Temp: 98.5 °F (36.9 °C)   SpO2: 99%     Vitals within 20% of pt's admission vitals as per SHERRY SCORE    SpO2: 99 %    O2 Flow Rate (L/min): 3 L/min      Intake/Output Summary (Last 24 hours) at 2/3/2025 1529  Last data filed at 2/3/2025 1430  Gross per 24 hour   Intake 1352.85 ml   Output 1801 ml   Net -448.15 ml       Pain assessment:  present - adequately treated    Pain Level: 5    Patient was assessed for alterations to skin integrity. There were not alterations observed.    Is patient incontinent: no    Patient has all belongings at discharge from PACU.  PACU RN called and updated patient's family.       Handoff report given at bedside.   Family updated and directed to pt room 6313        2/3/2025 3:29 PM

## 2025-02-03 NOTE — PROGRESS NOTES
Pre-Operative Note  Resident Note     Patient: Bhaskar Jean-Baptiste     Procedure: Incision and drainage with delayed primary closure right foot    Clearance for surgery: Yes, per Internal Medicine    Consent: Procedure was discussed at length with patient and all questions were answered to completion, consent obtained and placed in chart     Labs:      No results for input(s): \"WBC\", \"HGB\", \"HCT\", \"MCV\", \"PLT\" in the last 72 hours.     Recent Labs     02/01/25  0459 02/02/25  0517    138   K 4.4 4.8   CL 99 101   CO2 28 28   BUN 14 15   CREATININE 1.4* 1.4*      No results for input(s): \"AST\", \"ALT\", \"BILIDIR\", \"BILITOT\", \"ALKPHOS\" in the last 72 hours.    Invalid input(s): \"ALB\"   No results for input(s): \"LIPASE\", \"AMYLASE\" in the last 72 hours.     Recent Labs     02/02/25  0941   INR 1.19*      No results for input(s): \"CKTOTAL\", \"CKMB\", \"CKMBINDEX\", \"TROPONINI\" in the last 72 hours.    Pre-op Medications:   Current Facility-Administered Medications   Medication Dose Route Frequency Provider Last Rate Last Admin    phenol 1.4 % mouth spray 1 spray  1 spray Mouth/Throat PRN Sin Fairchild MD   1 spray at 01/31/25 0934    oxyCODONE-acetaminophen (PERCOCET) 5-325 MG per tablet 1 tablet  1 tablet Oral Q4H PRN Abdirahman Sharp DPM        Or    oxyCODONE-acetaminophen (PERCOCET) 5-325 MG per tablet 2 tablet  2 tablet Oral Q4H PRN Abdirahman Sharp DPM   2 tablet at 02/02/25 2016    cefTRIAXone (ROCEPHIN) 2,000 mg in sterile water 20 mL IV syringe  2,000 mg IntraVENous Q24H Major Spears MD   2,000 mg at 02/02/25 1536    calcium carbonate (TUMS) chewable tablet 500 mg  500 mg Oral TID PRN Myranda Shay APRN - CNP   500 mg at 01/30/25 1134    [Held by provider] aspirin EC tablet 81 mg  81 mg Oral Daily Nabeel Alvarado MD        [Held by provider] clopidogrel (PLAVIX) tablet 75 mg  75 mg Oral Daily Nabeel Alvarado MD        losartan (COZAAR) tablet 25 mg  25 mg Oral Daily

## 2025-02-03 NOTE — PROGRESS NOTES
[facility] -  Clinical Pharmacy Note    Vancomycin - Management by Pharmacy    Consult Date(s): 01/29/2025  Consulting Provider(s): Dr. Nabeel Alvarado    Assessment / Plan  1)  Rt diabetic foot infection - Vancomycin  Concurrent Antimicrobials: Rocephin  Day of Vanc Therapy / Ordered Duration: 5 of TBD  Current Dosing Method: Bayesian-Guided AUC Dosing  Therapeutic Goal: -600 mg/L*hr  Current Dose / Plan:   Hx of CKD - baseline appears to be ~1.5.  SCr 1.2 today  Currently on 1500mg IV q24h.  Random level today = 16.7 mcg/mL; calculated AUC = 503 with trough = 13.3 mcg/mL.  Continue same regimen.  Will plan for repeat level in ~2-3 days (or sooner if clinically indicated).  Will continue to monitor clinical condition and make adjustments to regimen as appropriate.    Please call with questions--  Thanks--  Alejandra Hoyt, PharmD, BCPS, BCGP  [id number] ([facility])   2/3/2025 1:41 PM        Interval update:   Back to OR today for I&D with delayed primary closure.    Subjective/Objective:   Bhaskar Jean-Baptiste is a 74 y.o. male with a PMHx significant for PAD, T2DM, CAD, HLD, CKD3, CVA who presented with R foot wound infection. Admitted with R foot wound infection, suspected osteomyelitis.  Pt is s/p Rt foot I&D with bone biopsy (1/31/25).    Pharmacy is consulted to dose Vancomycin.    Ht Readings from Last 1 Encounters:   01/17/25 1.753 m (5' 9\")     Wt Readings from Last 1 Encounters:   01/29/25 100.9 kg (222 lb 8 oz)     Current & Prior Antimicrobial Regimen(s):  Zosyn (01/29- Current)  Vancomycin- Pharmacy to dose  2500 mg IV x1 (01/29)  1500mg IV q24h (1/30-current)    Vancomycin Level(s) / Doses:    Date Time Dose Type of Level / Level Interpretation   1/31 05:02 1500mg q24h Random = 20.4 mcg/mL Drawn ~12.5h after 2nd total dose  AUC = 561; ssTr = 15.5  Continue same regimen   2/3 12:50 1500mg q24h Random = 16.7 mcg/mL Drawn ~19h after prior dose  AUC = 503; ssTr = 13.3   Continue  same regimen   Note: Serum levels collected for AUC-based dosing may be high if collected in close proximity to the dose administered. This is not necessarily indicative of toxicity.    Cultures & Sensitivities:    Date Site Micro Susceptibility / Result   1/31 Surgical cx (foot)  **submitted on swab - interpret with caution per micro Staph epi in broth No further w/u           Recent Labs     02/01/25  0459 02/02/25  0517 02/03/25  1250   CREATININE 1.4* 1.4* 1.2   BUN 14 15 15   WBC  --   --  11.9*       Estimated Creatinine Clearance: 65 mL/min (based on SCr of 1.2 mg/dL).    Additional Lab Values / Findings of Note:    No results for input(s): \"PROCAL\" in the last 72 hours.

## 2025-02-03 NOTE — ANESTHESIA PRE PROCEDURE
Component Value Date/Time    WBC 11.6 01/30/2025 08:28 AM    RBC 4.01 01/30/2025 08:28 AM    HGB 11.6 01/30/2025 08:28 AM    HCT 35.4 01/30/2025 08:28 AM    MCV 88.2 01/30/2025 08:28 AM    RDW 14.1 01/30/2025 08:28 AM     01/30/2025 08:28 AM       CMP:   Lab Results   Component Value Date/Time     02/02/2025 05:17 AM    K 4.8 02/02/2025 05:17 AM    K 4.1 01/29/2025 04:01 PM     02/02/2025 05:17 AM    CO2 28 02/02/2025 05:17 AM    BUN 15 02/02/2025 05:17 AM    CREATININE 1.4 02/02/2025 05:17 AM    LABGLOM 53 02/02/2025 05:17 AM    LABGLOM 59 01/05/2023 01:10 PM    GLUCOSE 179 02/02/2025 05:17 AM    CALCIUM 9.6 02/02/2025 05:17 AM       POC Tests:   Recent Labs     02/03/25  1154   POCGLU 190*       Coags:   Lab Results   Component Value Date/Time    PROTIME 15.3 02/02/2025 09:41 AM    INR 1.19 02/02/2025 09:41 AM       HCG (If Applicable): No results found for: \"PREGTESTUR\", \"PREGSERUM\", \"HCG\", \"HCGQUANT\"     ABGs: No results found for: \"PHART\", \"PO2ART\", \"LWQ6ORE\", \"TNP4ENH\", \"BEART\", \"C3RHJMZV\"     Type & Screen (If Applicable):  Lab Results   Component Value Date    ABORH O POS 01/30/2025    LABANTI NEG 01/30/2025       Drug/Infectious Status (If Applicable):  No results found for: \"HIV\", \"HEPCAB\"    COVID-19 Screening (If Applicable): No results found for: \"COVID19\"        Anesthesia Evaluation  Patient summary reviewed and Nursing notes reviewed   no history of anesthetic complications:   Airway: Mallampati: II  TM distance: >3 FB   Neck ROM: full  Mouth opening: > = 3 FB   Dental:          Pulmonary:                              Cardiovascular:    (+) hypertension:, past MI:, CAD:, hyperlipidemia                  Neuro/Psych:   (+) CVA:            GI/Hepatic/Renal:   (+) renal disease: CRI          Endo/Other:    (+) DiabetesType II DM.                 Abdominal:             Vascular:          Other Findings:       Anesthesia Plan      general     ASA 3     (74-year-old male presents for

## 2025-02-03 NOTE — BRIEF OP NOTE
Brief Postoperative Note      Patient: Bhaskar Jean-Baptiste  YOB: 1950  MRN: [id number]    Date of Procedure: 2/3/2025    Pre-Op Diagnosis Codes:      * Diabetic infection of right foot (HCC) [E11.628, L08.9]    Post-Op Diagnosis: Same       Procedure(s):  INCISION AND DRAINAGE WITH DELAYED PRIMARY CLOSURE RIGHT FOOT    Surgeon(s):  Antonio Acuña DPM    Assistant:  Resident: Americo Valdivia DPM; Noe Bahena DPM    Anesthesia: General    Estimated Blood Loss (mL): Minimal    Complications: None    Hemostasis: Anatomic Dissection     Injectables: Pre-Op 10 cc of 1% Lidocaine plain and Post-Op 10 cc of 0.5 % Marcaine plain    Materials: 2-0 Nylon    Specimens:   * No specimens in log *    Implants:  * No implants in log *      Drains: * No LDAs found *    Findings:  Infection Present At Time Of Surgery (PATOS) (choose all levels that have infection present):  - Organ Space infection (below fascia) present as evidenced by osteomyelitis  Other Findings: Skin was found to be extremely friable and without integrity. Half of incision was unable to be closed with suture. However, skin edges remain re-approximated.      DISPO: S/P Incision and Drainage with delayed primary closure, right foot. Recommend patient continue on antibiotics per ID; (Daptomycin and vancomycin).  Procedure was felt to be definitive. Patient is non-weightbearing to right foot with surgical shoe.  Patient will be stable for discharge, pending medical clearance, PICC placement, and pathology results.       Noe Bahena DPM, MS  Podiatric Resident PGY-1  [employer]  Pager #[phone]  2/3/2025, 2:34 PM

## 2025-02-03 NOTE — PROGRESS NOTES
Pt has been off floor for surgery and unable to have PICC placed, will f/u 2/4 for possible placement.

## 2025-02-03 NOTE — PROGRESS NOTES
ID Follow-up NOTE    CC:   Right foot infection with suspected OM   Antibiotics: Vanco piptazo    Admit Date: 1/29/2025  Hospital Day: 6    Subjective:     Patient denies any fevers or chills.   Plans to return to the OR today to have wound closure.       Objective:     Patient Vitals for the past 8 hrs:   BP Temp Temp src Pulse Resp SpO2   02/03/25 0836 (!) 146/78 98.3 °F (36.8 °C) Oral 71 16 94 %   02/03/25 0353 136/89 98.4 °F (36.9 °C) Oral 73 16 97 %     I/O last 3 completed shifts:  In: 902.9 [P.O.:600; I.V.:302.9]  Out: 4500 [Urine:4500]  No intake/output data recorded.    EXAM:  GENERAL: No apparent distress.    HEENT: Membranes moist, no oral lesion  NECK:  Supple, no lymphadenopathy  LUNGS: Clear b/l, no rales, no dullness  CARDIAC: RRR, no murmur appreciated  ABD:  + BS, soft / NT  EXT:  Patient with dressing in the right foot post op.   See photos R foot 1/29 prior to debridement.     NEURO: No focal neurologic findings  PSYCH: Orientation, sensorium, mood normal  LINES:  Peripheral iv       Data Review:  Lab Results   Component Value Date    WBC 11.6 (H) 01/30/2025    HGB 11.6 (L) 01/30/2025    HCT 35.4 (L) 01/30/2025    MCV 88.2 01/30/2025     01/30/2025     Lab Results   Component Value Date    CREATININE 1.4 (H) 02/02/2025    BUN 15 02/02/2025     02/02/2025    K 4.8 02/02/2025     02/02/2025    CO2 28 02/02/2025       Hepatic Function Panel: No results found for: \"ALKPHOS\", \"ALT\", \"AST\", \"BILITOT\", \"BILIDIR\", \"IBILI\", \"LABALBU\"    MICRO:  OR cultures from right third toe bone biopsy 1/13: staph epi ion broth only.      IMAGING:I have independently reviewed the images and reports.       Right foot X ray 1/31:  Postsurgical changes of distal third ray amputation. There is erosion of the  distal second and third third metatarsals consistent with osteomyelitis  described on recent MRI.Mild degenerative changes of the midfoot. Mild dorsal and plantar calcaneal spurring.    MRI Right

## 2025-02-03 NOTE — CARE COORDINATION
CM  following for  d/c planning:    Patient  her with Foot Infection:    Procedure: Incision and drainage with delayed primary closure right foot     Podiatry  following :  NPO  for  Surgery     PTOT  rec  Home with  HHC  , ID follow for poss  Home IV  atbx:    [facility]    Services Available   Home Health Services      Address   [address]  [facility] [address]             Contact Information    [phone]        [facility]    Services Available   Home Health Services      Address   [address]  [facility] [address]             Contact Information    [phone]          Will need  DME RW at  d/c as well   [facility] and [facility].    PICC  line  ordered        Electronically signed by Vashti Ty RN on 2/3/2025 at 9:35 AM       Vashti Ty RN Case Manager  [facility] [id number]  [facility] [address] [phone]  Fax [phone]

## 2025-02-03 NOTE — ANESTHESIA POSTPROCEDURE EVALUATION
Department of Anesthesiology  Postprocedure Note    Patient: Bhaskar Jean-Baptiste  MRN: [id number]  YOB: 1950  Date of evaluation: 2/3/2025    Procedure Summary       Date: 02/03/25 Room / Location: [place] [address] / [facility]    Anesthesia Start: 1345 Anesthesia Stop: 1433    Procedure: INCISION AND DRAINAGE WITH DELAYED PRIMARY CLOSURE RIGHT FOOT (Right: Foot) Diagnosis:       Diabetic infection of right foot (HCC)      (Diabetic infection of right foot (HCC) [E11.628, L08.9])    Surgeons: Antonio Acuña DPM Responsible Provider: Gregg Ceron MD    Anesthesia Type: general ASA Status: 3            Anesthesia Type: No value filed.    Kranthi Phase I: Kranthi Score: 9    Kranthi Phase II:      Anesthesia Post Evaluation    Patient location during evaluation: PACU  Patient participation: complete - patient participated  Level of consciousness: awake  Pain score: 0  Nausea & Vomiting: no nausea and no vomiting  Cardiovascular status: blood pressure returned to baseline  Respiratory status: acceptable  Hydration status: euvolemic  Pain management: adequate    No notable events documented.

## 2025-02-04 ENCOUNTER — APPOINTMENT (OUTPATIENT)
Dept: NUCLEAR MEDICINE | Age: 75
DRG: 857 | End: 2025-02-04
Attending: INTERNAL MEDICINE
Payer: MEDICARE

## 2025-02-04 ENCOUNTER — APPOINTMENT (OUTPATIENT)
Age: 75
DRG: 857 | End: 2025-02-04
Attending: INTERNAL MEDICINE
Payer: MEDICARE

## 2025-02-04 PROBLEM — I10 PRIMARY HYPERTENSION: Status: ACTIVE | Noted: 2025-02-04

## 2025-02-04 PROBLEM — E78.2 MIXED HYPERLIPIDEMIA: Status: ACTIVE | Noted: 2025-02-04

## 2025-02-04 LAB
ANION GAP SERPL CALCULATED.3IONS-SCNC: 11 MMOL/L (ref 3–16)
BASOPHILS # BLD: 0 K/UL (ref 0–0.2)
BASOPHILS NFR BLD: 0.4 %
BUN SERPL-MCNC: 15 MG/DL (ref 7–20)
CALCIUM SERPL-MCNC: 9.4 MG/DL (ref 8.3–10.6)
CHLORIDE SERPL-SCNC: 100 MMOL/L (ref 99–110)
CO2 SERPL-SCNC: 25 MMOL/L (ref 21–32)
CREAT SERPL-MCNC: 1.2 MG/DL (ref 0.8–1.3)
DEPRECATED RDW RBC AUTO: 13.7 % (ref 12.4–15.4)
EOSINOPHIL # BLD: 0.5 K/UL (ref 0–0.6)
EOSINOPHIL NFR BLD: 3.6 %
GFR SERPLBLD CREATININE-BSD FMLA CKD-EPI: 64 ML/MIN/{1.73_M2}
GLUCOSE BLD-MCNC: 166 MG/DL (ref 70–99)
GLUCOSE BLD-MCNC: 168 MG/DL (ref 70–99)
GLUCOSE BLD-MCNC: 187 MG/DL (ref 70–99)
GLUCOSE BLD-MCNC: 212 MG/DL (ref 70–99)
GLUCOSE SERPL-MCNC: 166 MG/DL (ref 70–99)
HCT VFR BLD AUTO: 34.7 % (ref 40.5–52.5)
HGB BLD-MCNC: 11.6 G/DL (ref 13.5–17.5)
LYMPHOCYTES # BLD: 1.3 K/UL (ref 1–5.1)
LYMPHOCYTES NFR BLD: 10.6 %
MCH RBC QN AUTO: 29.2 PG (ref 26–34)
MCHC RBC AUTO-ENTMCNC: 33.4 G/DL (ref 31–36)
MCV RBC AUTO: 87.3 FL (ref 80–100)
MONOCYTES # BLD: 1.2 K/UL (ref 0–1.3)
MONOCYTES NFR BLD: 9.9 %
NEUTROPHILS # BLD: 9.6 K/UL (ref 1.7–7.7)
NEUTROPHILS NFR BLD: 75.5 %
NUC STRESS EJECTION FRACTION: 65 %
NUC STRESS LV EDV: 121 ML (ref 67–155)
NUC STRESS LV ESV: 42 ML (ref 22–58)
NUC STRESS LV MASS: 159 G
NUC STRESS LV STROKE VOLUME: 79 ML
PERFORMED ON: ABNORMAL
PLATELET # BLD AUTO: 332 K/UL (ref 135–450)
PMV BLD AUTO: 7.6 FL (ref 5–10.5)
POTASSIUM SERPL-SCNC: 4.1 MMOL/L (ref 3.5–5.1)
RBC # BLD AUTO: 3.97 M/UL (ref 4.2–5.9)
SODIUM SERPL-SCNC: 136 MMOL/L (ref 136–145)
STRESS BASELINE DIAS BP: 82 MMHG
STRESS BASELINE HR: 81 BPM
STRESS BASELINE SYS BP: 143 MMHG
STRESS ESTIMATED WORKLOAD: 1 METS
STRESS EXERCISE DUR MIN: 1 MIN
STRESS PEAK DIAS BP: 69 MMHG
STRESS PEAK SYS BP: 152 MMHG
STRESS PERCENT HR ACHIEVED: 63 %
STRESS POST PEAK HR: 93 BPM
STRESS RATE PRESSURE PRODUCT: NORMAL BPM*MMHG
STRESS TARGET HR: 148 BPM
WBC # BLD AUTO: 12.6 K/UL (ref 4–11)

## 2025-02-04 PROCEDURE — 93016 CV STRESS TEST SUPVJ ONLY: CPT | Performed by: INTERNAL MEDICINE

## 2025-02-04 PROCEDURE — 1200000000 HC SEMI PRIVATE

## 2025-02-04 PROCEDURE — 99232 SBSQ HOSP IP/OBS MODERATE 35: CPT | Performed by: INTERNAL MEDICINE

## 2025-02-04 PROCEDURE — 85025 COMPLETE CBC W/AUTO DIFF WBC: CPT

## 2025-02-04 PROCEDURE — 99232 SBSQ HOSP IP/OBS MODERATE 35: CPT | Performed by: NURSE PRACTITIONER

## 2025-02-04 PROCEDURE — 6370000000 HC RX 637 (ALT 250 FOR IP)

## 2025-02-04 PROCEDURE — 6360000002 HC RX W HCPCS: Performed by: INTERNAL MEDICINE

## 2025-02-04 PROCEDURE — 3430000000 HC RX DIAGNOSTIC RADIOPHARMACEUTICAL: Performed by: INTERNAL MEDICINE

## 2025-02-04 PROCEDURE — 97530 THERAPEUTIC ACTIVITIES: CPT

## 2025-02-04 PROCEDURE — 78452 HT MUSCLE IMAGE SPECT MULT: CPT

## 2025-02-04 PROCEDURE — A9502 TC99M TETROFOSMIN: HCPCS | Performed by: INTERNAL MEDICINE

## 2025-02-04 PROCEDURE — 93018 CV STRESS TEST I&R ONLY: CPT | Performed by: INTERNAL MEDICINE

## 2025-02-04 PROCEDURE — 80048 BASIC METABOLIC PNL TOTAL CA: CPT

## 2025-02-04 PROCEDURE — 99233 SBSQ HOSP IP/OBS HIGH 50: CPT | Performed by: INTERNAL MEDICINE

## 2025-02-04 PROCEDURE — 6360000002 HC RX W HCPCS

## 2025-02-04 PROCEDURE — 36415 COLL VENOUS BLD VENIPUNCTURE: CPT

## 2025-02-04 PROCEDURE — 2500000003 HC RX 250 WO HCPCS

## 2025-02-04 PROCEDURE — 93017 CV STRESS TEST TRACING ONLY: CPT

## 2025-02-04 PROCEDURE — 78452 HT MUSCLE IMAGE SPECT MULT: CPT | Performed by: INTERNAL MEDICINE

## 2025-02-04 PROCEDURE — 97116 GAIT TRAINING THERAPY: CPT

## 2025-02-04 RX ORDER — REGADENOSON 0.08 MG/ML
0.4 INJECTION, SOLUTION INTRAVENOUS
Status: COMPLETED | OUTPATIENT
Start: 2025-02-04 | End: 2025-02-04

## 2025-02-04 RX ADMIN — TETROFOSMIN 35.5 MILLICURIE: 1.38 INJECTION, POWDER, LYOPHILIZED, FOR SOLUTION INTRAVENOUS at 14:05

## 2025-02-04 RX ADMIN — SODIUM CHLORIDE, PRESERVATIVE FREE 10 ML: 5 INJECTION INTRAVENOUS at 09:57

## 2025-02-04 RX ADMIN — ROSUVASTATIN CALCIUM 20 MG: 20 TABLET, FILM COATED ORAL at 20:11

## 2025-02-04 RX ADMIN — VANCOMYCIN 1500 MG: 1.5 INJECTION, SOLUTION INTRAVENOUS at 17:11

## 2025-02-04 RX ADMIN — OXYCODONE HYDROCHLORIDE AND ACETAMINOPHEN 2 TABLET: 5; 325 TABLET ORAL at 13:57

## 2025-02-04 RX ADMIN — TETROFOSMIN 11.55 MILLICURIE: 1.38 INJECTION, POWDER, LYOPHILIZED, FOR SOLUTION INTRAVENOUS at 13:05

## 2025-02-04 RX ADMIN — OXYCODONE HYDROCHLORIDE AND ACETAMINOPHEN 2 TABLET: 5; 325 TABLET ORAL at 17:58

## 2025-02-04 RX ADMIN — OXYCODONE HYDROCHLORIDE AND ACETAMINOPHEN 2 TABLET: 5; 325 TABLET ORAL at 04:45

## 2025-02-04 RX ADMIN — SODIUM CHLORIDE, PRESERVATIVE FREE 10 ML: 5 INJECTION INTRAVENOUS at 20:11

## 2025-02-04 RX ADMIN — REGADENOSON 0.4 MG: 0.08 INJECTION, SOLUTION INTRAVENOUS at 14:05

## 2025-02-04 RX ADMIN — OXYCODONE HYDROCHLORIDE AND ACETAMINOPHEN 2 TABLET: 5; 325 TABLET ORAL at 09:48

## 2025-02-04 RX ADMIN — LOSARTAN POTASSIUM 25 MG: 50 TABLET, FILM COATED ORAL at 09:48

## 2025-02-04 ASSESSMENT — PAIN DESCRIPTION - DESCRIPTORS
DESCRIPTORS: STABBING
DESCRIPTORS: DISCOMFORT
DESCRIPTORS: STABBING
DESCRIPTORS: STABBING

## 2025-02-04 ASSESSMENT — PAIN SCALES - GENERAL
PAINLEVEL_OUTOF10: 7
PAINLEVEL_OUTOF10: 9

## 2025-02-04 ASSESSMENT — PAIN DESCRIPTION - LOCATION
LOCATION: FOOT
LOCATION: LEG;FOOT

## 2025-02-04 ASSESSMENT — PAIN DESCRIPTION - ORIENTATION
ORIENTATION: RIGHT

## 2025-02-04 ASSESSMENT — PAIN DESCRIPTION - FREQUENCY
FREQUENCY: CONTINUOUS
FREQUENCY: INTERMITTENT

## 2025-02-04 ASSESSMENT — PAIN DESCRIPTION - ONSET
ONSET: GRADUAL
ONSET: ON-GOING

## 2025-02-04 ASSESSMENT — PAIN DESCRIPTION - PAIN TYPE
TYPE: ACUTE PAIN
TYPE: ACUTE PAIN;SURGICAL PAIN

## 2025-02-04 NOTE — PROGRESS NOTES
Interventional cardiology progress note    Patient did have a stress test today was found to have significant inferior lateral infarct area.  There is small amount anteriorly of potentially reversible ischemia.  He is clinically stable from a cardiac ischemia perspective.  Rhythm remained stable.  And is now recuperating from his debridement of his right foot and middle toe area osteomyelitis.    At this point in time I do not see a need for any acute cardiac interventions or cardiac cath.  He is completely asymptomatic.  Would follow him in the outpatient area in a couple weeks.  And from a cardiac perspective he can go home today.    Donald Cronin MD, FACC

## 2025-02-04 NOTE — PLAN OF CARE
Problem: ABCDS Injury Assessment  Goal: Absence of physical injury  Outcome: Progressing  Flowsheets (Taken 2/4/2025 1548)  Absence of Physical Injury: Implement safety measures based on patient assessment     Problem: Pain  Goal: Verbalizes/displays adequate comfort level or baseline comfort level  Outcome: Progressing  Flowsheets (Taken 2/4/2025 1548)  Verbalizes/displays adequate comfort level or baseline comfort level:   Encourage patient to monitor pain and request assistance   Assess pain using appropriate pain scale   Implement non-pharmacological measures as appropriate and evaluate response   Administer analgesics based on type and severity of pain and evaluate response   Consider cultural and social influences on pain and pain management   Notify Licensed Independent Practitioner if interventions unsuccessful or patient reports new pain  Note: Pt c/o pain in R foot due to incision. Percocet available prn per MAR. Pt satisfied with pain management. Foot elevated off bed. Pt resting. Will continue to monitor     Problem: Safety - Adult  Goal: Free from fall injury  Outcome: Progressing  Flowsheets (Taken 2/4/2025 1548)  Free From Fall Injury:   Instruct family/caregiver on patient safety   Based on caregiver fall risk screen, instruct family/caregiver to ask for assistance with transferring infant if caregiver noted to have fall risk factors  Note: Pt remains free from falls. Fall precautions in place. Bed locked and in lowest position. Bed alarm on, gripper socks on. Bedside table and call light within reach. Pt educated on safety precautions, call light use. Pt verbalizes demonstrates understanding. Pt alert and oriented x4, follows commands. Pt compliant with fall precautions.     Problem: Chronic Conditions and Co-morbidities  Goal: Patient's chronic conditions and co-morbidity symptoms are monitored and maintained or improved  Outcome: Progressing  Flowsheets (Taken 2/4/2025 1548)  Care Plan -

## 2025-02-04 NOTE — CARE COORDINATION
MAYO  spoke with  Ira from PICC team  who  came by to place  PICC  , but pt  was still off unit  for  stress test:  MAYO spoke with  Megan in [facility] who also said  she  was planning to teach IV administration  but  pt  off unit still.     MAYO  called  IV atbx  / and lab orders to  Pharmacist Rita:  @ [facility]  [phone].    -  she will get  update from Megan the  type of Line , and # of Lumens .. Once line is placed.     -  Patient  will need  Knee scooter  at  d/c  , it is  DME not  coverded by Insurance  usually :  [employer]  does not  provide  :  pt  will need to get one from [employer] or a [employer]  etc.       Electronically signed by Vashti Ty RN on 2/4/2025 at 4:10 PM     +++++++++++++++++++++++++++++++++      MAYO  following for  d/c planning:    Patient  here with Foot Infection:    Procedure: Incision and drainage with delayed primary closure right foot     Podiatry  following : NWB     PTOT  rec  Home with  [facility]  , ID follow for poss  Home IV  atbx:    [facility]    Services Available   Home Health Services      Address   [address] [facility] [address]             Contact Information    [phone]          [facility]    Services Available   Home Health Services      Address   [address]  [facility] [address]             Contact Information    [phone]          Will need  DME RW at  d/c as well .  Scooter  Knee.     Referral to  [facility]  In [place] :   following and [facility].    PICC  line  ordered  and plan to be placed  later today after stress test  today  .      Megan  with [facility]  to teach at  bedside  with wife and Patient  .      Per  ID:   INFUSION ORDERS:  - Drug: IV daptomycin 500 mg daily  - Planned End date: 3/14/2025  - Diagnosis: right foot 3rd MT OM  - Has received test dose in hospital  - Routine   - Check CBC w diff, CMP, ESR, CRP, CK every Mon or Tue - FAX result to [phone]  - Call with  antibiotic / infusion issues, [phone]  - Call with any change in status, transfer in or out of a facility or to hospital;   .      Electronically signed by Vashti Ty RN on 2/4/2025 at 1:53 PM       Vashti Ty RN Case Manager  [facility] [address]  [facility] [address] [phone]  Fax [phone]

## 2025-02-04 NOTE — OP NOTE
Operative Note      Patient: Bhaskar Jean-Baptiste  YOB: 1950  MRN: [id number]    Date of Procedure: 2/3/2025    Pre-Op Diagnosis Codes:      * Diabetic infection of right foot (HCC) [E11.628, L08.9]    Post-Op Diagnosis: Same       Procedure(s):  INCISION AND DRAINAGE WITH DELAYED PRIMARY CLOSURE RIGHT FOOT    Surgeon(s):  Antonio Acuña DPM    Assistant:   Resident: Americo Valdivia DPM; Noe Bahena DPM    Anesthesia: General    Estimated Blood Loss (mL): Minimal    Complications: None    Hemostasis: Anatomic Dissection      Injectables: Pre-Op 10 cc of 1% Lidocaine plain and Post-Op 10 cc of 0.5 % Marcaine plain     Materials: 2-0 Nylon    Specimens:   * No specimens in log *    Implants:  * No implants in log *      Drains: * No LDAs found *    Findings:  Infection Present At Time Of Surgery (PATOS) (choose all levels that have infection present):  - Organ Space infection (below fascia) present as evidenced by osteomyelitis  Other Findings: Skin was found to be extremely friable and without integrity. Half of incision was unable to be closed with suture. However, skin edges remain re-approximated.    Detailed Description of Procedure:     INDICATIONS FOR PROCEDURE: This patient has signs and symptoms clinically and radiographically consistent with the above mentioned preoperative diagnosis. Having failed conservative treatment, it was determined that the patient would benefit from surgical intervention. All potential risks, benefits, and complications were discussed with the patient prior to the scheduling of surgery. All the patient's questions were answered and no guarantees were given. The patient wished to proceed with surgery, and informed written consent was obtained.     DETAILS OF PROCEDURE: The patient was brought from the pre-operative area and placed on the operating table in the supine position. Following IV sedation, a local anesthetic block was then injected  proximal to the incision site consisting of 10cc of 1% lidocaine plain. The right lower extremity was then scrubbed, prepped, and draped in the usual sterile fashion. A time-out was performed. The patient, procedure, and operative site were confirmed.     Procedure #1: Incision and Drainage with Delayed Primary Closure:  At this time attention was directed to the patient's right foot where a prior digit amputation is taking place held together with retention sutures.  The sutures were subsequently removed to reveal a full-thickness wound.  Utilizing pulse lavage, 3 L of sterile saline was utilized to irrigate the wound site.  A rongeur was then utilized to remove all fibrotic tissue within the wound.  Once once it was adequately irrigated and fibrotic tissue removed decision was then had to close the wound.  Utilizing 3-0 nylon simple sutures were used to close the wound.  It was noted the distal aspect of the wound site was composed of very soft friable skin preventing closure with sutures.  Roughly 1/3 of the distal wound site was not sutured, however the skin edges were reapproximated with bandaging. At this time, a local anesthetic was injected about the incision sites consisting of 10 cc 0.5% marcaine plain, for the patient's postoperative comfort. A soft sterile dressing was applied consisting of gauze, ryan, webril, ace. A prompt hyperemic response was noted on all remaining aspects of the patient's right lower extremity.    END OF PROCEDURE: The patient tolerated the procedure and anesthesia well and was transported from the operating room to the PACU with vital signs stable and vascular status intact to all aspects of the patient's right lower extremity and digital capillary refill time immediate to the remaining digits of the right foot. Following a period of post-operative monitoring, the patient will be brought back to the floor for observation. The patient is to keep dressing clean, dry and intact at

## 2025-02-04 NOTE — PROGRESS NOTES
To pt room to place PICC, pt noted to be in stress, d/w case management, pt is staying another night due to other organizing needs for home care, will place line 2/5.

## 2025-02-04 NOTE — PROGRESS NOTES
[facility] -  Clinical Pharmacy Note    Vancomycin - Management by Pharmacy    Consult Date(s): 01/29/2025  Consulting Provider(s): Dr. Nabeel Alvarado    Assessment / Plan  1)  Rt diabetic foot infection - Vancomycin  Concurrent Antimicrobials: n/a  Day of Vanc Therapy / Ordered Duration: 6 of TBD  Current Dosing Method: Bayesian-Guided AUC Dosing  Therapeutic Goal: -600 mg/L*hr  Current Dose / Plan:   Hx of CKD - baseline appears to be ~1.5.  SCr stable at 1.2 today  Currently on 1500mg IV q24h.  Calculated AUC = 503 with trough = 13.3 mcg/mL based on level 2/3.  Continue same regimen.  Will plan for repeat level in ~2 days (or sooner if clinically indicated).  Will continue to monitor clinical condition and make adjustments to regimen as appropriate.    Please call with questions--  Thanks--  Alejandra Hoyt, PharmD, BCPS, BCGP  [id number] ([facility])   2/4/2025 9:40 AM        Interval update:   Pt is now s/p I&D with delayed primary closure (done 2/3).  PICC to be placed today.  ID recommending Daptomycin through 3/15/25 at discharge.    Subjective/Objective:   Bhaskar Jean-Baptiste is a 74 y.o. male with a PMHx significant for PAD, T2DM, CAD, HLD, CKD3, CVA who presented with R foot wound infection. Admitted with R foot wound infection, suspected osteomyelitis.  Pt is s/p Rt foot I&D with bone biopsy (1/31/25).    Pharmacy is consulted to dose Vancomycin.    Ht Readings from Last 1 Encounters:   02/03/25 1.753 m (5' 9\")     Wt Readings from Last 1 Encounters:   01/29/25 100.9 kg (222 lb 8 oz)     Current & Prior Antimicrobial Regimen(s):  Zosyn (01/29- Current)  Vancomycin- Pharmacy to dose  2500 mg IV x1 (01/29)  1500mg IV q24h (1/30-current)    Vancomycin Level(s) / Doses:    Date Time Dose Type of Level / Level Interpretation   1/31 05:02 1500mg q24h Random = 20.4 mcg/mL Drawn ~12.5h after 2nd total dose  AUC = 561; ssTr = 15.5  Continue same regimen   2/3 12:50 1500mg q24h Random =

## 2025-02-04 NOTE — PROGRESS NOTES
Podiatric Surgery Daily Progress Note  Bhaskar Jean-Baptiste      Subjective :   Patient seen and examined this am at the bedside. S/p Right foot I&D with delayed primary closure (2/3/25). POD #1. Patient denies any acute overnight events. Patient denies N/V/F/C/SOB. Patient denies calf pain, thigh pain, chest pain.     Review of Systems: A 12 point review of symptoms is unremarkable with the exception of the chief complaint. Patient specifically denies nausea, fever, vomiting, chills, shortness of breath, chest pain, abdominal pain, constipation or difficulty urinating.       Objective     BP (!) 154/73   Pulse 73   Temp 98.5 °F (36.9 °C) (Oral)   Resp 16   Ht 1.753 m (5' 9\")   Wt 100.9 kg (222 lb 8 oz)   SpO2 95%   BMI 32.86 kg/m²      I/O:  Intake/Output Summary (Last 24 hours) at 2/4/2025 0740  Last data filed at 2/4/2025 0002  Gross per 24 hour   Intake 700 ml   Output 826 ml   Net -126 ml              Wt Readings from Last 3 Encounters:   01/29/25 100.9 kg (222 lb 8 oz)   01/17/25 99.3 kg (219 lb)   01/06/25 100.3 kg (221 lb 1 oz)       LABS:    Recent Labs     02/03/25  1250 02/04/25  0449   WBC 11.9* 12.6*   HGB 12.6* 11.6*   HCT 37.9* 34.7*    332        Recent Labs     02/04/25  0449      K 4.1      CO2 25   BUN 15   CREATININE 1.2        Recent Labs     02/02/25  0941   INR 1.19*           LOWER EXTREMITY EXAMINATION    Dressing to right lower extremity left clean, dry, and intact.  No strikethrough noted to external dressing.    CFT right to digits bilateral.  Sensation diminished at baseline to digits bilateral.  No pain with calf compression bilateral.  Patient able to perform active range of motion to digits bilateral.      IMAGING:  XR right foot 1/29/2025  IMPRESSION:  Osseous erosive change along the second and third metatarsal heads, suspicious for osteomyelitis.  Forefoot soft tissue swelling and gas.    Arterial duplex bilateral lower extremity 1/30/2025    Right side  (12.6)  -ESR 78->49 and ->152.0  -Images reviewed, impression noted above  -Surgical culture ->NGTD  -Surgical path pending  -ID following; Plan for PICC line: Drug: IV daptomycin 500 mg daily, Planned End date: 3/14/2025  -Right lower extremity dressing left clean,dry, intact.   -Patient with postoperative shoe to be worn at all times  -Patient should be non-weightbearing to right LE in surgical lalit shoe only  -PT/OT evaluation pending,non-weight bearing right lower extremity       DISPO: S/P Right foot I&D with bone biopsy (1/31/25).  Labs and imaging reviewed.  ID following recommend PICC line: IV daptomycin 500 mg daily through 3/14/2025.  Surgical path and culture pending. PT/OT consulted;Non-weightbearing to RLE. Patient is ok to discharge pending PICC placement , PT/OT  consult and cardiology recommendations.     Discussed assessment and plan with Dr. Antonio Acuña DPM.    Kathleen Padilla DPM   Chief Podiatric Resident PGY3  Pager [phone] or [facility]  2/4/2025, 7:40 AM

## 2025-02-04 NOTE — PROGRESS NOTES
Cardio called this AM asking if plan was to still do a stress test. They were unable to complete stress test yesterday prior to Pt going to surgery. They were wondering if stress test was still wanted and to call back when decision known. Please let RN know of decision, thanks. Call cardiology at [phone].

## 2025-02-04 NOTE — PROGRESS NOTES
ID Follow-up NOTE    CC:   Right foot infection with suspected OM   Antibiotics: Vanco, piptazo    Admit Date: 1/29/2025  Hospital Day: 7    Subjective:     Patient denies any fevers or chills. S/p OR yesterday for delayed closure. No fevers or chills.       Objective:     Patient Vitals for the past 8 hrs:   BP Temp Temp src Pulse Resp SpO2   02/04/25 0948 (!) 174/90 -- -- -- 16 --   02/04/25 0752 (!) 167/82 97.8 °F (36.6 °C) Oral 86 16 96 %   02/04/25 0445 (!) 154/73 -- -- 73 16 95 %     I/O last 3 completed shifts:  In: 1232.9 [P.O.:240; I.V.:992.9]  Out: 2626 [Urine:2625; Blood:1]  I/O this shift:  In: -   Out: 275 [Urine:275]    EXAM:  GENERAL: No apparent distress.    HEENT: Membranes moist, no oral lesion  NECK:  Supple, no lymphadenopathy  LUNGS: Clear b/l, no rales, no dullness  CARDIAC: RRR, no murmur appreciated  ABD:  + BS, soft / NT  EXT:  Patient with dressing in the right foot post op.   See photos R foot 1/29 prior to debridement.     NEURO: No focal neurologic findings  PSYCH: Orientation, sensorium, mood normal  LINES:  Peripheral iv       Data Review:  Lab Results   Component Value Date    WBC 12.6 (H) 02/04/2025    HGB 11.6 (L) 02/04/2025    HCT 34.7 (L) 02/04/2025    MCV 87.3 02/04/2025     02/04/2025     Lab Results   Component Value Date    CREATININE 1.2 02/04/2025    BUN 15 02/04/2025     02/04/2025    K 4.1 02/04/2025     02/04/2025    CO2 25 02/04/2025       Hepatic Function Panel: No results found for: \"ALKPHOS\", \"ALT\", \"AST\", \"BILITOT\", \"BILIDIR\", \"IBILI\", \"LABALBU\"    MICRO:  OR cultures from right third toe bone biopsy 1/13: staph epi ion broth only.      IMAGING:I have independently reviewed the images and reports.       Right foot X ray 1/31:  Postsurgical changes of distal third ray amputation. There is erosion of the  distal second and third third metatarsals consistent with osteomyelitis  described on recent MRI.Mild degenerative changes of the midfoot. Mild

## 2025-02-04 NOTE — PROGRESS NOTES
Hospitalist Progress Note      Name:  Bhaskar Jean-Baptiste /Age/Sex: 1950  (74 y.o. male)   MRN & CSN:  [id number] & [id number] Encounter Date/Time: 2025 2:39 PM EST    Location:  [facility]- PCP: Hilda Mir MD       Hospital Day: 7         Date of Admission: 2025    Chief Complaint: Wound infection    Hospital Course: Bhaskar Jean-Baptiste is a 74 y.o. male with a pmh of peripheral arterial disease, DM-2, CAD, hyperlipidemia, essential hypertension, CKD stage III, CVA who presents with Wound infection of his right foot with increased swelling and nonpalpable pulses. Podiatry consulted and evaluated. Cardiology consulted - cleared for surgical intervention; S/P I & D RIGHT wound 25; S/P OR again 2/3/25 for closure of wound; ID consulted and following: IV Daptomycin 500 MG daily until 3/14/25 for third MT OM; PICC line placement ordered 2/3.      Subjective: Upset about care; upset about yesterday with multiple providers in room at once; concern for direction of care; not wanting to lose more toes. Pain control.      Assessment and Recommendations:    RIGHT foot wound infection w/OM  Elevated inflammatory markers (CRP = 114, ESR = 78)  S/P right third toe amputation on 2025  Podiatry consulted/following:   S/P I & D 25   S/P wound closure 2/3  Pharmacy consult for vancomycin dosing   ID: IV Daptomycin until 3/14/25   PICC line pending   Partial weightbearing on the right  Pain relief as needed  No Lovenox for DVT prophylaxis and SCDs   not used given PAD and nonpalpable pulses  Right lower extremity dressing left clean,dry, intact.   -Patient with postoperative shoe to be worn at all times  -Patient should be non-weightbearing to right LE in surgical lalit shoe only  -PT/OT evaluation pending,non-weight bearing right lower extremity  PAD   Nonpalpable pulses at outpt podiatry appt   History of revascularization on 24   Arterial duplex bilateral lower extremity

## 2025-02-04 NOTE — PROGRESS NOTES
Physical Therapy  Facility/Department: [address]  Physical Therapy Treatment    Name: Bhaskar Jean-Baptiste  : 1950  MRN: [id number]  Date of Service: 2025    Discharge Recommendations:  Home with assist PRN, Home with Home health PT   PT Equipment Recommendations  Equipment Needed: Yes (rollabout)      Patient Diagnosis(es): The primary encounter diagnosis was PAD (peripheral artery disease) (HCC). Diagnoses of Diabetic foot infection (HCC), Coronary artery disease involving transplanted heart, unspecified vessel or lesion type, unspecified whether angina present, Type 2 diabetes mellitus with left diabetic foot ulcer (HCC), and Coronary artery disease involving native coronary artery of native heart without angina pectoris were also pertinent to this visit.  Past Medical History:  has a past medical history of CAD (coronary artery disease), CKD (chronic kidney disease) stage 3, GFR 30-59 ml/min (HCC), Diabetes mellitus (HCC), HLD (hyperlipidemia), HTN (hypertension), Hyperlipidemia, Hypertension, Ischemic stroke (HCC), and MI (myocardial infarction) ([facility]).  Past Surgical History:  has a past surgical history that includes Hand surgery (Left); Foot surgery (Left, 2022); Ankle surgery (Left, 2022); Foot Debridement (Left, 2022); Foot Debridement (Left, 2023); invasive vascular (N/A, 2024); invasive vascular (N/A, 2024); Colonoscopy; hernia repair; Toe amputation (Right, 2025); Foot Debridement (Right, 2025); and Foot Debridement (Right, 2/3/2025).    Assessment  Body Structures, Functions, Activity Limitations Requiring Skilled Therapeutic Intervention: Decreased functional mobility ;Decreased strength;Decreased endurance;Decreased balance  Assessment: Pt now NWB RLE since previous session.  Reports experience with NWB a year ago with LLE.  Used rollabout at this time with success.  Pt currently able to transfer on/off rollabout with CGA and  History  Lives With: Spouse  Type of Home: Condo  Home Layout: Multi-level (has bed/bath on lower level - 10 steps down to basement)  Home Access: Stairs to enter with rails  Entrance Stairs - Number of Steps: 3 KATHERIN with 1 rail through garage  Home Equipment: None  Has the patient had two or more falls in the past year or any fall with injury in the past year?: No  Prior Level of Assist for ADLs: Independent  Prior Level of Assist for Homemaking: Independent  Prior Level of Assist for Ambulation:  ((I) ambulation without an assistive device; recent right 3rd toe amputation and has been wearing surgical shoe since)  Prior Level of Assist for Transfers: Independent  Active : Yes  Additional Comments: Reports wife will not be with him all the time  Vision/Hearing  Vision  Vision: Impaired  Vision Exceptions: Wears glasses for reading  Hearing  Hearing: Within functional limits    Cognition   Orientation  Overall Orientation Status: Within Functional Limits (agitated)    Objective                                 Bed mobility  Supine to Sit: Modified independent (HOB elevated)  Sit to Supine: Modified independent (HOB elevated)  Transfers  Sit to Stand: Contact guard assistance (from bed onto rollabout, cues for technique and NWB RLE)  Stand to Sit: Contact guard assistance (to bed from rollabout, cues for technique and NWB RLE)  Ambulation  Device:  (rollabout)  Assistance: Contact guard assistance (to SBA; SBA straight distances, CGA for turning)  Quality of Gait: unsteady with turning and maneuvering rollabout for dismount but no LOB  Distance: 30' in room  Stairs/Curb  Stairs?:  (Declined to practice steps - plans to scoot up/down on bottom)        Exercise Treatment: Educated pt on BLE SLR, hip abd, LAQ, marching.  Pt verbalized understanding       OutComes Score                                                  AM-PAC - Mobility    AM-PAC Basic Mobility - Inpatient   How much help is needed turning from your

## 2025-02-04 NOTE — PROGRESS NOTES
[facility] Cardiology Progress Note    Primary Cardiologist: Dr Cronin    CC/HPI:     S: Agitated this morning. Denies cp, sob, or palpitations.     Tele: Sinus, PVC    O:  Physical Exam:  BP (!) 174/90   Pulse 86   Temp 97.8 °F (36.6 °C) (Oral)   Resp 16   Ht 1.753 m (5' 9\")   Wt 100.9 kg (222 lb 8 oz)   SpO2 96%   BMI 32.86 kg/m²    General (appearance):  No acute distress  Eyes: anicteric   Neck: soft, No JVD  Ears/Nose/Mouth/Thorat: No cyanosis  CV: RRR   Respiratory:  diminished, normal effort  GI: soft, non-tender, non-distended  Skin: Warm, dry. No rashes  Neuro/Psych: Alert and oriented x 3. Appropriate behavior  Ext:  No c/c. Right foot wrapped.     I.O's= -4.4 L     Weight  Admission: Weight - Scale: 100.9 kg (222 lb 8 oz)   Today: Weight - Scale: 100.9 kg (222 lb 8 oz)    CBC:   Recent Labs     25  1250 25  0449   WBC 11.9* 12.6*   HGB 12.6* 11.6*   HCT 37.9* 34.7*   MCV 88.0 87.3    332     BMP:   Recent Labs     25  0517 25  1250 25  0449    139 136   K 4.8 4.5 4.1    100 100   CO2 28 27 25   BUN 15 15 15   CREATININE 1.4* 1.2 1.2     Estimated Creatinine Clearance: 65 mL/min (based on SCr of 1.2 mg/dL).  Mag: No results found for: \"MG\"  LIVER PROFILE: No results for input(s): \"AST\", \"ALT\", \"LIPASE\", \"AMYLASE\", \"BILIDIR\", \"BILITOT\", \"ALKPHOS\" in the last 72 hours.    Invalid input(s): \"ALB\"  Pro-BNP: No results found for: \"PROBNP\"  High Sensitivity Troponin:   Lab Results   Component Value Date    CKTOTAL 47 2025       Imagin2025 LE arterial duplex    Right side findings: Resting SARAVANAN is non-compressible (SARAVANAN >1.4).    Left side findings: Resting SARAVANAN is non-compressible (SARAVANAN >1.4).     Nondiagnostic bilateral ankle-brachial indices due to vessel noncompressibility.     Right femoral-popliteal artery stenoses measuring less than 50% at all locations associated with normal flow velocities through all

## 2025-02-05 VITALS
RESPIRATION RATE: 16 BRPM | BODY MASS INDEX: 32.95 KG/M2 | SYSTOLIC BLOOD PRESSURE: 146 MMHG | TEMPERATURE: 98.1 F | OXYGEN SATURATION: 96 % | DIASTOLIC BLOOD PRESSURE: 79 MMHG | HEIGHT: 69 IN | WEIGHT: 222.5 LBS | HEART RATE: 78 BPM

## 2025-02-05 LAB
ANION GAP SERPL CALCULATED.3IONS-SCNC: 11 MMOL/L (ref 3–16)
BASOPHILS # BLD: 0 K/UL (ref 0–0.2)
BASOPHILS NFR BLD: 0.4 %
BUN SERPL-MCNC: 13 MG/DL (ref 7–20)
CALCIUM SERPL-MCNC: 9.4 MG/DL (ref 8.3–10.6)
CHLORIDE SERPL-SCNC: 99 MMOL/L (ref 99–110)
CO2 SERPL-SCNC: 24 MMOL/L (ref 21–32)
CREAT SERPL-MCNC: 1.3 MG/DL (ref 0.8–1.3)
DEPRECATED RDW RBC AUTO: 13.8 % (ref 12.4–15.4)
EOSINOPHIL # BLD: 0.4 K/UL (ref 0–0.6)
EOSINOPHIL NFR BLD: 3.2 %
GFR SERPLBLD CREATININE-BSD FMLA CKD-EPI: 58 ML/MIN/{1.73_M2}
GLUCOSE BLD-MCNC: 182 MG/DL (ref 70–99)
GLUCOSE BLD-MCNC: 188 MG/DL (ref 70–99)
GLUCOSE SERPL-MCNC: 220 MG/DL (ref 70–99)
HCT VFR BLD AUTO: 33.3 % (ref 40.5–52.5)
HGB BLD-MCNC: 11.2 G/DL (ref 13.5–17.5)
LYMPHOCYTES # BLD: 1 K/UL (ref 1–5.1)
LYMPHOCYTES NFR BLD: 8.2 %
MCH RBC QN AUTO: 29.5 PG (ref 26–34)
MCHC RBC AUTO-ENTMCNC: 33.7 G/DL (ref 31–36)
MCV RBC AUTO: 87.5 FL (ref 80–100)
MONOCYTES # BLD: 1.2 K/UL (ref 0–1.3)
MONOCYTES NFR BLD: 9.9 %
NEUTROPHILS # BLD: 9.5 K/UL (ref 1.7–7.7)
NEUTROPHILS NFR BLD: 78.3 %
PERFORMED ON: ABNORMAL
PERFORMED ON: ABNORMAL
PLATELET # BLD AUTO: 324 K/UL (ref 135–450)
PMV BLD AUTO: 7.6 FL (ref 5–10.5)
POTASSIUM SERPL-SCNC: 4.1 MMOL/L (ref 3.5–5.1)
RBC # BLD AUTO: 3.8 M/UL (ref 4.2–5.9)
SODIUM SERPL-SCNC: 134 MMOL/L (ref 136–145)
WBC # BLD AUTO: 12.2 K/UL (ref 4–11)

## 2025-02-05 PROCEDURE — 36415 COLL VENOUS BLD VENIPUNCTURE: CPT

## 2025-02-05 PROCEDURE — 6370000000 HC RX 637 (ALT 250 FOR IP)

## 2025-02-05 PROCEDURE — 6360000002 HC RX W HCPCS

## 2025-02-05 PROCEDURE — 80048 BASIC METABOLIC PNL TOTAL CA: CPT

## 2025-02-05 PROCEDURE — 97535 SELF CARE MNGMENT TRAINING: CPT

## 2025-02-05 PROCEDURE — 85025 COMPLETE CBC W/AUTO DIFF WBC: CPT

## 2025-02-05 PROCEDURE — C1751 CATH, INF, PER/CENT/MIDLINE: HCPCS

## 2025-02-05 PROCEDURE — 97168 OT RE-EVAL EST PLAN CARE: CPT

## 2025-02-05 PROCEDURE — 99232 SBSQ HOSP IP/OBS MODERATE 35: CPT | Performed by: NURSE PRACTITIONER

## 2025-02-05 PROCEDURE — 2500000003 HC RX 250 WO HCPCS

## 2025-02-05 PROCEDURE — 02HV33Z INSERTION OF INFUSION DEVICE INTO SUPERIOR VENA CAVA, PERCUTANEOUS APPROACH: ICD-10-PCS | Performed by: STUDENT IN AN ORGANIZED HEALTH CARE EDUCATION/TRAINING PROGRAM

## 2025-02-05 PROCEDURE — 36569 INSJ PICC 5 YR+ W/O IMAGING: CPT

## 2025-02-05 PROCEDURE — 99233 SBSQ HOSP IP/OBS HIGH 50: CPT | Performed by: INTERNAL MEDICINE

## 2025-02-05 RX ORDER — LOSARTAN POTASSIUM 25 MG/1
25 TABLET ORAL ONCE
Status: DISCONTINUED | OUTPATIENT
Start: 2025-02-05 | End: 2025-02-05

## 2025-02-05 RX ORDER — OXYCODONE AND ACETAMINOPHEN 5; 325 MG/1; MG/1
2 TABLET ORAL EVERY 6 HOURS PRN
Qty: 40 TABLET | Refills: 0 | Status: SHIPPED | OUTPATIENT
Start: 2025-02-05 | End: 2025-02-10

## 2025-02-05 RX ORDER — LOSARTAN POTASSIUM 50 MG/1
50 TABLET ORAL DAILY
Status: DISCONTINUED | OUTPATIENT
Start: 2025-02-05 | End: 2025-02-05

## 2025-02-05 RX ORDER — LOSARTAN POTASSIUM 50 MG/1
50 TABLET ORAL DAILY
Status: DISCONTINUED | OUTPATIENT
Start: 2025-02-06 | End: 2025-02-05 | Stop reason: HOSPADM

## 2025-02-05 RX ORDER — LOSARTAN POTASSIUM 50 MG/1
50 TABLET ORAL DAILY
Status: DISCONTINUED | OUTPATIENT
Start: 2025-02-06 | End: 2025-02-05

## 2025-02-05 RX ORDER — ROSUVASTATIN CALCIUM 20 MG/1
20 TABLET, COATED ORAL NIGHTLY
Qty: 90 EACH | Refills: 3 | Status: SHIPPED | OUTPATIENT
Start: 2025-02-05

## 2025-02-05 RX ORDER — CARVEDILOL 3.12 MG/1
3.12 TABLET ORAL 2 TIMES DAILY WITH MEALS
Status: DISCONTINUED | OUTPATIENT
Start: 2025-02-05 | End: 2025-02-05

## 2025-02-05 RX ORDER — LOSARTAN POTASSIUM 25 MG/1
25 TABLET ORAL DAILY
Status: DISCONTINUED | OUTPATIENT
Start: 2025-02-06 | End: 2025-02-05

## 2025-02-05 RX ORDER — LOSARTAN POTASSIUM 50 MG/1
50 TABLET ORAL DAILY
Qty: 90 EACH | Refills: 3 | Status: SHIPPED | OUTPATIENT
Start: 2025-02-06

## 2025-02-05 RX ADMIN — SODIUM CHLORIDE, PRESERVATIVE FREE 10 ML: 5 INJECTION INTRAVENOUS at 08:36

## 2025-02-05 RX ADMIN — OXYCODONE HYDROCHLORIDE AND ACETAMINOPHEN 2 TABLET: 5; 325 TABLET ORAL at 14:18

## 2025-02-05 RX ADMIN — OXYCODONE HYDROCHLORIDE AND ACETAMINOPHEN 2 TABLET: 5; 325 TABLET ORAL at 01:46

## 2025-02-05 RX ADMIN — SODIUM CHLORIDE, PRESERVATIVE FREE 10 ML: 5 INJECTION INTRAVENOUS at 08:39

## 2025-02-05 RX ADMIN — LIDOCAINE HYDROCHLORIDE ANHYDROUS 50 MG: 10 INJECTION, SOLUTION INFILTRATION at 09:36

## 2025-02-05 RX ADMIN — LOSARTAN POTASSIUM 25 MG: 50 TABLET, FILM COATED ORAL at 08:35

## 2025-02-05 ASSESSMENT — PAIN DESCRIPTION - DESCRIPTORS: DESCRIPTORS: DISCOMFORT

## 2025-02-05 ASSESSMENT — PAIN SCALES - GENERAL
PAINLEVEL_OUTOF10: 7
PAINLEVEL_OUTOF10: 4
PAINLEVEL_OUTOF10: 8

## 2025-02-05 ASSESSMENT — PAIN DESCRIPTION - ORIENTATION: ORIENTATION: RIGHT

## 2025-02-05 ASSESSMENT — PAIN DESCRIPTION - LOCATION: LOCATION: FOOT

## 2025-02-05 NOTE — DISCHARGE SUMMARY
V2.0  Discharge Summary    Name:  Bhaskar Jean-Baptiste /Age/Sex: 1950 (74 y.o. male)   Admit Date: 2025  Discharge Date: 25    MRN & CSN:  [id number] & [id number] Encounter Date and Time 25 11:24 AM EST    Attending:  Ganesh Singletary MD Discharging Provider: ODALIS Guerrero [facility]       Hospital Course:     Brief HPI: Bhaskar Jean-Baptiste is a 74 y.o. male with a pmh of peripheral arterial disease, DM-2, CAD, hyperlipidemia, essential hypertension, CKD stage III, CVA who presents with Wound infection of his right foot with increased swelling and nonpalpable pulses. Podiatry consulted and evaluated. Cardiology consulted - cleared for surgical intervention; S/P I & D RIGHT wound 25; S/P OR again 2/3/25 for closure of wound; ID consulted and following: IV Daptomycin 500 MG daily until 3/14/25 for third MT OM; PICC line placement ordered 2/3 & placed . Cardiac stress test completed . Per podiatry, pt can resume PO ASA and Plavix ; Cardiology increased Losartan to 50 MG QD; Continue Crestor. Home health care to follow for home IV infusions. PT/OT consulted and completed evaluations.  Pt is medically stable to discharge home today. VSS, HDS.     Problems addressed during this hospitalization:   RIGHT foot wound infection w/OM  Elevated inflammatory markers (CRP = 114, ESR = 78)  S/P right third toe amputation on 2025  Podiatry consulted/followed:              S/P I & D 25              S/P wound closure 2/3  Pt was on IV Vanc while inpt through               Per ID: IV Daptomycin 500 MG QD until 3/14/25  To start ; PICC placed                NWB to RIGHT LE in surgical shoe only  Pain relief as needed  Okay to resume anticoagulates   Follow up as outpt; wound care as directed  PAD              Nonpalpable pulses at outpt podiatry appt              History of revascularization on 24              Arterial duplex bilateral lower extremity 2025

## 2025-02-05 NOTE — PROCEDURES
PROCEDURE NOTE  Date: 2025   Name: Bhaskar Jean-Baptiste  YOB: 1950    Procedures                                                                         SINGLE PICC PROCEDURE NOTE  Chart reviewed for allergies, diagnosis, labs, known contraindications, reason for line placement and planned length of treatment.  Informed consent noted to be signed and on chart.  Insertion procedure discussed with patient/family member.  Three patient identifiers - Patient name,   and MRN -  completed &  confirmed verbally.         Time out performed Hat, mask and eye shield donned.  PICC site cleaned with chlorhexidine wipes then scrubbed with Chloraprep  One-Step applicator for 30 seconds x 1.   Hand Hygiene  performed with 3% Chlorhexidine surgical scrub x1 min prior to  sterile gloves, sterile gown being donned.  Patient draped using maximal sterile barrier technique ( head to toe ).  PICC site scrubbed a 2nd time with Chloraprep One-Step applicator x 30 sec. Modified Seldinger technique/ultrasound assisted and tip locating system utilized for insertion and 1% Lidocaine 5 ml injected intradermal pre-insertion.  PICC tip location in the SVC confirmed by ECG technology.   Positive brisk blood return obtained from lumen.  Valve applied to lumen and flushed with 10 mls  0.9% Sterile Sodium Chloride.  All lumens flush easily with no resistance.  Skin prep applied to site.  Catheter secured with non-sutured locking device per hospital protocol. Bio-patch/CHG impregnated sterile tegaderm dressing applied.  Alcohol Swab Cap applied to valve.  Sterile field maintained during procedure.  PICC insertion, rhythm and positioning wire (utilized prn) accounted for post procedure and disposed of in sharps.  Appearance of site is Clean dry and intact without bleeding or edema. All edges of Tegaderm occlusive.   Site marked with date and initials of RN placing line. Teaching performed to pt/family and noted in education

## 2025-02-05 NOTE — PROGRESS NOTES
Occupational Therapy  Facility/Department: [address]  Occupational Therapy Initial Assessment/Treatment/Discharge    Name: Bhaskar Jean-Baptiste  : 1950  MRN: [id number]  Date of Service: 2025    Discharge Recommendations:  Home with Home health OT  OT Equipment Recommendations  Equipment Needed: Yes  Mobility Devices: ADL Assistive Devices  ADL Assistive Devices: Toilet Safety Frame;Shower Chair with back (Cast cover)       Patient Diagnosis(es): The primary encounter diagnosis was PAD (peripheral artery disease) (HCC). Diagnoses of Diabetic foot infection (HCC), Coronary artery disease involving transplanted heart, unspecified vessel or lesion type, unspecified whether angina present, Type 2 diabetes mellitus with left diabetic foot ulcer (HCC), Coronary artery disease involving native coronary artery of native heart without angina pectoris, and Wound infection were also pertinent to this visit.  Past Medical History:  has a past medical history of CAD (coronary artery disease), CKD (chronic kidney disease) stage 3, GFR 30-59 ml/min (HCC), Diabetes mellitus (HCC), HLD (hyperlipidemia), HTN (hypertension), Hyperlipidemia, Hypertension, Ischemic stroke ([facility]), and MI (myocardial infarction) ([facility]).  Past Surgical History:  has a past surgical history that includes Hand surgery (Left); Foot surgery (Left, 2022); Ankle surgery (Left, 2022); Foot Debridement (Left, 2022); Foot Debridement (Left, 2023); invasive vascular (N/A, 2024); invasive vascular (N/A, 2024); Colonoscopy; hernia repair; Toe amputation (Right, 2025); Foot Debridement (Right, 2025); and Foot Debridement (Right, 2/3/2025).           Assessment  Performance deficits / Impairments: Decreased functional mobility ;Decreased ADL status;Decreased safe awareness  Assessment: New orders to reassess functional status s/p R foot wound closure 2/3. Pt is now NWB status, PTA, pt from home w/ wife

## 2025-02-05 NOTE — PROGRESS NOTES
[facility] -  Clinical Pharmacy Note    Vancomycin - Management by Pharmacy    Consult Date(s): 01/29/2025  Consulting Provider(s): Dr. Nabeel Alvarado    Assessment / Plan  1)  Rt diabetic foot infection - Vancomycin  Concurrent Antimicrobials: n/a  Day of Vanc Therapy / Ordered Duration: 7 of TBD  Current Dosing Method: Bayesian-Guided AUC Dosing  Therapeutic Goal: -600 mg/L*hr  Current Dose / Plan:   Hx of CKD - baseline appears to be ~1.5.  SCr stable at 1.3 today  Currently on 1500mg IV q24h.  Calculated AUC = 533 with trough = 14.5 mcg/mL based on level 2/3 and today's SCr.  Continue same regimen.  Planning for discharge soon on Daptomycin; repeat Vanc level may not be needed if SCr remains stable.  Will monitor and order level if plan or clinical condition changes.  Will continue to monitor clinical condition and make adjustments to regimen as appropriate.    Please call with questions--  Thanks--  Alejandra Hoyt, PharmD, BCPS, BCGP  [phone] ([facility])   2/5/2025 7:59 AM        Interval update:   PICC to be placed today (delayed yesterday d/t stress test).  ID recommending Daptomycin through 3/15/25 at discharge (test dose given yesterday).      Subjective/Objective:   Bhaskar Jean-Baptiste is a 74 y.o. male with a PMHx significant for PAD, T2DM, CAD, HLD, CKD3, CVA who presented with R foot wound infection. Admitted with R foot wound infection, suspected osteomyelitis.  Pt is s/p Rt foot I&D with bone biopsy (1/31/25) and s/p I&D with delayed primary closure (done 2/3).      Pharmacy is consulted to dose Vancomycin.    Ht Readings from Last 1 Encounters:   02/03/25 1.753 m (5' 9\")     Wt Readings from Last 1 Encounters:   01/29/25 100.9 kg (222 lb 8 oz)     Current & Prior Antimicrobial Regimen(s):  Zosyn (01/29- Current)  Vancomycin- Pharmacy to dose  2500 mg IV x1 (01/29)  1500mg IV q24h (1/30-current)    Vancomycin Level(s) / Doses:    Date Time Dose Type of Level / Level  Interpretation   1/31 05:02 1500mg q24h Random = 20.4 mcg/mL Drawn ~12.5h after 2nd total dose  AUC = 561; ssTr = 15.5  Continue same regimen   2/3 12:50 1500mg q24h Random = 16.7 mcg/mL Drawn ~19h after prior dose  AUC = 503; ssTr = 13.3   Continue same regimen   Note: Serum levels collected for AUC-based dosing may be high if collected in close proximity to the dose administered. This is not necessarily indicative of toxicity.    Cultures & Sensitivities:    Date Site Micro Susceptibility / Result   1/31 Surgical cx (foot)  **submitted on swab - interpret with caution per micro Staph epi in broth No further w/u           Recent Labs     02/03/25  1250 02/04/25  0449 02/05/25  0353   CREATININE 1.2 1.2 1.3   BUN 15 15 13   WBC 11.9* 12.6* 12.2*       Estimated Creatinine Clearance: 60 mL/min (based on SCr of 1.3 mg/dL).    Additional Lab Values / Findings of Note:    No results for input(s): \"PROCAL\" in the last 72 hours.

## 2025-02-05 NOTE — PROGRESS NOTES
[facility] Cardiology Progress Note    Primary Cardiologist: Dr Cronin    CC/HPI:     S: Agitated again this morning. Denies cp, sob, or palpitations. Wants to go home. Discussed adding coreg for HTN and CAD but he declined. He was agreeable to have losartan increased to 50 mg for BP control.     Tele: Sinus, PVC    O:  Physical Exam:  BP (!) 146/79   Pulse 78   Temp 98.1 °F (36.7 °C) (Oral)   Resp 16   Ht 1.753 m (5' 9\")   Wt 100.9 kg (222 lb 8 oz)   SpO2 96%   BMI 32.86 kg/m²    General (appearance):  No acute distress  Eyes: anicteric   Neck: soft, No JVD  Ears/Nose/Mouth/Thorat: No cyanosis  CV: RRR   Respiratory:  diminished, normal effort  GI: soft, non-tender, non-distended  Skin: Warm, dry. No rashes  Neuro/Psych: Alert and oriented x 3. Appropriate behavior  Ext:  No c/c. Right foot wrapped.     I.O's= -4.4 L     Weight  Admission: Weight - Scale: 100.9 kg (222 lb 8 oz)   Today: Weight - Scale: 100.9 kg (222 lb 8 oz)    CBC:   Recent Labs     25  1250 25  0449 25  0353   WBC 11.9* 12.6* 12.2*   HGB 12.6* 11.6* 11.2*   HCT 37.9* 34.7* 33.3*   MCV 88.0 87.3 87.5    332 324     BMP:   Recent Labs     25  1250 25  0449 25  0353    136 134*   K 4.5 4.1 4.1    100 99   CO2 27 25 24   BUN 15 15 13   CREATININE 1.2 1.2 1.3     Estimated Creatinine Clearance: 60 mL/min (based on SCr of 1.3 mg/dL).  Mag: No results found for: \"MG\"  LIVER PROFILE: No results for input(s): \"AST\", \"ALT\", \"LIPASE\", \"AMYLASE\", \"BILIDIR\", \"BILITOT\", \"ALKPHOS\" in the last 72 hours.    Invalid input(s): \"ALB\"  Pro-BNP: No results found for: \"PROBNP\"  High Sensitivity Troponin:   Lab Results   Component Value Date    CKTOTAL 47 2025       Imagin2025 Stress test      Stress Combined Conclusion: The study is most consistent with prior myocardial infarction in the inferolateral wall.  No significant ischemia seen in the territory.

## 2025-02-05 NOTE — PROGRESS NOTES
Pt appears to be anxious and sad. Pt expressing desire to return home.  offered compassionate listening presence, and words of encouragement. Spiritual Health is available to visit pt as needed.

## 2025-02-05 NOTE — PROGRESS NOTES
ID Follow-up NOTE    CC:   Right foot infection with suspected OM   Antibiotics: Vanco, piptazo    Admit Date: 1/29/2025  Hospital Day: 8    Subjective:     Patient denies any fevers or chills. No fevers or chills. Pt's wife is at bedside. Option care in room to teach abx administration.       Objective:     Patient Vitals for the past 8 hrs:   BP Temp Temp src Pulse Resp SpO2   02/05/25 0840 (!) 146/79 98.1 °F (36.7 °C) Oral 78 16 96 %     I/O last 3 completed shifts:  In: -   Out: 3375 [Urine:3375]  No intake/output data recorded.    EXAM:  GENERAL: No apparent distress.    HEENT: Membranes moist, no oral lesion  NECK:  Supple, no lymphadenopathy  LUNGS: Clear b/l, no rales, no dullness  CARDIAC: RRR, no murmur appreciated  ABD:  + BS, soft / NT  EXT:  Patient with dressing in the right foot post op.   See photos R foot 1/29 prior to debridement.     NEURO: No focal neurologic findings  PSYCH: Orientation, sensorium, mood normal  LINES:  Peripheral iv       Data Review:  Lab Results   Component Value Date    WBC 12.2 (H) 02/05/2025    HGB 11.2 (L) 02/05/2025    HCT 33.3 (L) 02/05/2025    MCV 87.5 02/05/2025     02/05/2025     Lab Results   Component Value Date    CREATININE 1.3 02/05/2025    BUN 13 02/05/2025     (L) 02/05/2025    K 4.1 02/05/2025    CL 99 02/05/2025    CO2 24 02/05/2025       Hepatic Function Panel: No results found for: \"ALKPHOS\", \"ALT\", \"AST\", \"BILITOT\", \"BILIDIR\", \"IBILI\", \"LABALBU\"    MICRO:  OR cultures from right third toe bone biopsy 1/13: staph epi ion broth only.      IMAGING:I have independently reviewed the images and reports.       Right foot X ray 1/31:  Postsurgical changes of distal third ray amputation. There is erosion of the  distal second and third third metatarsals consistent with osteomyelitis  described on recent MRI.Mild degenerative changes of the midfoot. Mild dorsal and plantar calcaneal spurring.    MRI Right foot 1/30:  1. Acute osteomyelitis involving the  around 1.4.    -Renally dose adjust antibiotics as needed.  Close monitoring of vancomycin and adjustment per goal trough 15-20 to avoid toxicities.    DM2:  -Good glycemic control to aid in healing and prevention of further infections.  -A1c 6.6 from this admission     PCN allergy:  - as a child and does not recall reaction, tolerated pip-tazo here without reported issues and tolerated cefepime in past.     INFUSION ORDERS:  - Drug: IV daptomycin 500 mg daily  - Planned End date: 3/14/2025  - Diagnosis: right foot 3rd MT OM  - Has received test dose in hospital  - Routine   - Check CBC w diff, CMP, ESR, CRP, CK every Mon or Tue - FAX result to [phone]  - Call with antibiotic / infusion issues, [phone]  - Call with any change in status, transfer in or out of a facility or to hospital; This DWIGHT is only valid for current disposition - [phone].    - No f/u in outpatient ID office necessary    Time spent in management of patient, coordination of care and discussion of OPAT, PICC care and education 55 mins.      Medical Decision Making:  The following items were considered in medical decision making:  Discussion of patient care with other providers  Reviewed clinical lab tests  Reviewed radiology tests  Reviewed other diagnostic tests/interventions  Independent review of radiologic images  Microbiology cultures and other micro tests reviewed      Discussed with patient, his wife at bedside, QUINTON, podiatry resident, Dr Padilla and primary team, hospitalist ALEJANDRA Fonseca. We will sign off, please call us with questions/concerns.      Major Spears MD

## 2025-02-05 NOTE — PROGRESS NOTES
Pt discharged to home. PICC line teaching completed, meds picked up from pharmacy, discharge paperwork discussed with pt, pt agreeable to discharge plan. Pt wheeled to ED lot with family.

## 2025-02-05 NOTE — CARE COORDINATION
Case Management Assessment            Discharge Note                    Date / Time of Note: 2/5/2025 2:37 PM                  Discharge Note Completed by: SUSAN LARKIN    Patient Name: Bhaskar Jean-Baptiste   YOB: 1950  Diagnosis: Wound infection [T14.8XXA, L08.9]  PAD (peripheral artery disease) (HCC) [I73.9]  Diabetic foot infection (HCC) [E11.628, L08.9]   Date / Time: 1/29/2025  2:41 PM    Current PCP: Hilda Mir MD  Clinic patient: No    Hospitalization in the last 30 days: No       Advance Directives:  Code Status: Full Code  [place] DNR form completed and on chart: Not Indicated    Financial:  Payor: MEDICARE / Plan: MEDICARE PART A AND B / Product Type: *No Product type* /      Pharmacy:    [id number] [place], [place] - [address] [phone] - F [phone] [address] [place] [address]  Phone: [phone] Fax: [phone]    [facility] [address] [place] [address] [address] - P [phone] - F [phone]  [facility] [address]  [facility] [address]  Phone: [phone] Fax: [phone]      Assistance purchasing medications?:    Assistance provided by Case Management: None at this time    Does patient want to participate in local refill/ meds to beds program?: Yes    Meds To Beds General Rules:  1. Can ONLY be done Monday- Friday between 8:30am-5pm  2. Prescription(s) must be in pharmacy by 3pm to be filled same day  3.Copy of patient's insurance/ prescription drug card and patient face sheet must be sent along with the prescription(s)  4. Cost of Rx cannot be added to hospital bill. If financial assistance is needed, please contact unit  or ;  or  CANNOT provide pharmacy voucher for patients co-pays  5. Patients can then  the prescription on their way out of the hospital at discharge, or pharmacy can deliver to the bedside if staff is available. (payment

## 2025-02-05 NOTE — PROGRESS NOTES
Podiatric Surgery Daily Progress Note  Bhaskar Jean-Baptiste      Subjective :   Patient seen and examined this am at the bedside. S/p Right foot I&D with delayed primary closure (2/3/25). POD #2. Patient denies any acute overnight events. Patient denies N/V/F/C/SOB. Patient denies calf pain, thigh pain, chest pain.     Review of Systems: A 12 point review of symptoms is unremarkable with the exception of the chief complaint. Patient specifically denies nausea, fever, vomiting, chills, shortness of breath, chest pain, abdominal pain, constipation or difficulty urinating.       Objective     BP (!) 157/73   Pulse 70   Temp 98.1 °F (36.7 °C) (Oral)   Resp 16   Ht 1.753 m (5' 9\")   Wt 100.9 kg (222 lb 8 oz)   SpO2 95%   BMI 32.86 kg/m²      I/O:  Intake/Output Summary (Last 24 hours) at 2/5/2025 0548  Last data filed at 2/5/2025 0146  Gross per 24 hour   Intake --   Output 2550 ml   Net -2550 ml              Wt Readings from Last 3 Encounters:   01/29/25 100.9 kg (222 lb 8 oz)   01/17/25 99.3 kg (219 lb)   01/06/25 100.3 kg (221 lb 1 oz)       LABS:    Recent Labs     02/04/25  0449 02/05/25  0353   WBC 12.6* 12.2*   HGB 11.6* 11.2*   HCT 34.7* 33.3*    324        Recent Labs     02/05/25  0353   *   K 4.1   CL 99   CO2 24   BUN 13   CREATININE 1.3        Recent Labs     02/02/25  0941   INR 1.19*           LOWER EXTREMITY EXAMINATION    Dressing to right lower extremity left clean, dry, and intact.  No strikethrough noted to external dressing.    CFT right to digits bilateral.  Sensation diminished at baseline to digits bilateral.  No pain with calf compression bilateral.  Patient able to perform active range of motion to digits bilateral.      IMAGING:  XR right foot 1/29/2025  IMPRESSION:  Osseous erosive change along the second and third metatarsal heads, suspicious for osteomyelitis.  Forefoot soft tissue swelling and gas.    Arterial duplex bilateral lower extremity 1/30/2025    Right side

## 2025-02-06 ENCOUNTER — CLINICAL DOCUMENTATION (OUTPATIENT)
Dept: INFECTIOUS DISEASES | Age: 75
End: 2025-02-06

## 2025-02-06 ENCOUNTER — TELEPHONE (OUTPATIENT)
Dept: INFECTIOUS DISEASES | Age: 75
End: 2025-02-06

## 2025-02-06 DIAGNOSIS — M86.9 OSTEOMYELITIS OF RIGHT FOOT, UNSPECIFIED TYPE: Primary | ICD-10-CM

## 2025-02-06 NOTE — PROGRESS NOTES
Dr. Spears has placed a referral order for pharmacist to manage Outpatient Parental Antimicrobial Therapy (OPAT) pursuant the ID Collaborative Practice Agreement.     Pertinent PMH and HPI:  PMH PAD, htn, T2DM, CKD, hld, CAD, CVA    Recent R third toe amp 1/6/25    Presents 1/29 with increased swelling      Pertinent Objective Data:    Wt Readings from Last 1 Encounters:   01/29/25 100.9 kg (222 lb 8 oz)      BMI Readings from Last 1 Encounters:   02/03/25 32.86 kg/m²      Baseline SCr= 1.3    Lab Results   Component Value Date    .0 (H) 02/02/2025       Lab Results   Component Value Date    SEDRATE 49 (H) 02/02/2025       Lab Results   Component Value Date    CKTOTAL 47 02/03/2025       Imaging:   MRI:  IMPRESSION:  1. Acute osteomyelitis involving the second and third metatarsals as well as the second proximal phalanx.  2. Soft tissue surrounding the second MTP joint demonstrate a lack of enhancement compatible with gangrenous changes no definite abscess.  3. Mild tenosynovitis surrounding the FHL.  4. Findings suggestive of a subacute fracture involving the navicular bone, with a small amount of marrow edema.       Micro:   1/31 sx cx: staph epi, gemella morbillorum, finegoldia     OPAT Orders:  Diagnosis  R DFI with OM s/p I&D 1/31 with delayed closure 2/3     Antimicrobial Regimen and Projected Term Date IV dapto 500 mg daily- 3/14    (6 weeks)   Weekly Lab Monitoring CBCwDiff, CMP, CRP, ESR, and CK   Any additional imaging or data needed prior to term? None   Any follow up in ID clinic? None   OPAT Access/Additional LDA R PICC   Disposition  [facility]     Lab and medication orders have been placed.    Clinical Pharmacist will review patient weekly or as needed and make recommendations regarding the above therapy plan.     Thank you,  Cherie Rice, PharmD, [facility]  Clinical Pharmacy Specialist- [facility] Infectious Disease  Office Phone: [phone] (also available on PerfectServe)  Fax:

## 2025-02-06 NOTE — TELEPHONE ENCOUNTER
Spoke with Deonte at [facility] and Jennifer FUNES at [facility] and verifed OPAT orders:     IV daptomycin 500 mg daily  - Planned End date: 3/14/2025  CBC w diff, CMP, ESR, CRP, CK

## 2025-02-07 LAB
BACTERIA SPEC AEROBE CULT: ABNORMAL
BACTERIA SPEC AEROBE CULT: ABNORMAL
BACTERIA SPEC ANAEROBE CULT: ABNORMAL
BACTERIA SPEC ANAEROBE CULT: ABNORMAL
ORGANISM: ABNORMAL

## 2025-02-10 ENCOUNTER — TELEPHONE (OUTPATIENT)
Dept: INFECTIOUS DISEASES | Age: 75
End: 2025-02-10

## 2025-02-10 LAB
ALBUMIN: 3.6 G/DL
ALBUMIN: 3.6 G/DL
ALP BLD-CCNC: 128 U/L
ALP BLD-CCNC: 128 U/L
ALT SERPL-CCNC: 22 U/L
ALT SERPL-CCNC: 22 U/L
ANION GAP SERPL CALCULATED.3IONS-SCNC: 12 MMOL/L
ANION GAP SERPL CALCULATED.3IONS-SCNC: 12 MMOL/L
AST SERPL-CCNC: 22 U/L
AST SERPL-CCNC: 22 U/L
BASOPHILS ABSOLUTE: 0.1 /ΜL
BASOPHILS ABSOLUTE: 0.1 /ΜL
BASOPHILS RELATIVE PERCENT: 0.7 %
BASOPHILS RELATIVE PERCENT: 0.7 %
BILIRUB SERPL-MCNC: 0.4 MG/DL (ref 0.1–1.4)
BILIRUB SERPL-MCNC: 0.4 MG/DL (ref 0.1–1.4)
BUN BLDV-MCNC: 20 MG/DL
BUN BLDV-MCNC: 20 MG/DL
C-REACTIVE PROTEIN: 24.27
C-REACTIVE PROTEIN: 24.27
CALCIUM SERPL-MCNC: 9.6 MG/DL
CALCIUM SERPL-MCNC: 9.6 MG/DL
CHLORIDE BLD-SCNC: 100 MMOL/L
CHLORIDE BLD-SCNC: 100 MMOL/L
CO2: 26 MMOL/L
CO2: 26 MMOL/L
CREAT SERPL-MCNC: 1.49 MG/DL
CREAT SERPL-MCNC: 1.49 MG/DL
EOSINOPHILS ABSOLUTE: 0.2 /ΜL
EOSINOPHILS ABSOLUTE: 0.2 /ΜL
EOSINOPHILS RELATIVE PERCENT: 2.5 %
EOSINOPHILS RELATIVE PERCENT: 2.5 %
FUNGUS SPEC CULT: NORMAL
GFR, ESTIMATED: 50
GFR, ESTIMATED: 50
GLUCOSE BLD-MCNC: 121 MG/DL
GLUCOSE BLD-MCNC: 121 MG/DL
HCT VFR BLD CALC: 36.7 % (ref 41–53)
HCT VFR BLD CALC: 36.7 % (ref 41–53)
HEMOGLOBIN: 11.9 G/DL (ref 13.5–17.5)
HEMOGLOBIN: 11.9 G/DL (ref 13.5–17.5)
LOEFFLER MB STN SPEC: NORMAL
LYMPHOCYTES ABSOLUTE: 1.9 /ΜL
LYMPHOCYTES ABSOLUTE: 1.9 /ΜL
LYMPHOCYTES RELATIVE PERCENT: 19.9 %
LYMPHOCYTES RELATIVE PERCENT: 19.9 %
MCH RBC QN AUTO: 28.7 PG
MCH RBC QN AUTO: 28.7 PG
MCHC RBC AUTO-ENTMCNC: 32.4 G/DL
MCHC RBC AUTO-ENTMCNC: 32.4 G/DL
MCV RBC AUTO: 88.4 FL
MCV RBC AUTO: 88.4 FL
MONOCYTES ABSOLUTE: 0.9 /ΜL
MONOCYTES ABSOLUTE: 0.9 /ΜL
MONOCYTES RELATIVE PERCENT: 8.9 %
MONOCYTES RELATIVE PERCENT: 8.9 %
NEUTROPHILS ABSOLUTE: 6.5 /ΜL
NEUTROPHILS ABSOLUTE: 6.5 /ΜL
NEUTROPHILS RELATIVE PERCENT: 67.2 %
NEUTROPHILS RELATIVE PERCENT: 67.2 %
PDW BLD-RTO: 13.2 %
PDW BLD-RTO: 13.2 %
PLATELET # BLD: 386 K/ΜL
PLATELET # BLD: 386 K/ΜL
PMV BLD AUTO: 10.3 FL
PMV BLD AUTO: 10.3 FL
POTASSIUM SERPL-SCNC: 4.2 MMOL/L
POTASSIUM SERPL-SCNC: 4.2 MMOL/L
RBC # BLD: 4.15 10^6/ΜL
RBC # BLD: 4.15 10^6/ΜL
SED RATE, AUTOMATED: 38
SED RATE, AUTOMATED: 38
SODIUM BLD-SCNC: 138 MMOL/L
SODIUM BLD-SCNC: 138 MMOL/L
TOTAL CK: 42 U/L
TOTAL CK: 42 U/L
TOTAL PROTEIN: 6.6 G/DL (ref 6.4–8.2)
TOTAL PROTEIN: 6.6 G/DL (ref 6.4–8.2)
WBC # BLD: 9.6 10^3/ML
WBC # BLD: 9.6 10^3/ML

## 2025-02-10 NOTE — TELEPHONE ENCOUNTER
OPAT Nurse Coordinator Weekly Update Note    Current OPAT plan:  IV daptomycin 500 mg daily  - Planned End date: 3/14/2025    Diagnosis:  right foot 3rd MT OM    Assessment:  Patient reports he is doing fine. Drsg in place with drainage noted.  Denies fevers. [facility] RN there today.      Follow up appts:  Dr. Acuña 2/12

## 2025-02-11 DIAGNOSIS — M86.9 OSTEOMYELITIS OF RIGHT FOOT, UNSPECIFIED TYPE: ICD-10-CM

## 2025-02-12 NOTE — PROGRESS NOTES
debridement of muscle of the right foot was performed during   procedure on 2/3.    Query created by: Myra Mathew on 2/5/2025 7:59 AM      Electronically signed by:  Antonio Acuña DPM 2/12/2025 11:38 AM

## 2025-02-14 NOTE — DISCHARGE INSTRUCTIONS
Eric[facility] [address]   [address]  [phone]    Dr. Jose Vann Operative Instructions    1. Have Prescriptions filled and take as directed. All medications should be taken with food or milk. 2.  Keep foot elevated six inches above the level of the heart. Support feet, legs, and knees with pillows. 3.  KEEP FOOT ELEVATED AS MUCH AS POSSIBLE UNTIL YOUR NEXT VISIT    4. Place an ice pack on the bandaged site for 15 minutes every hour while awake    5. Keep dressing clean, dry, and intact. DO NOT REMOVE DRESSING. CALL THE OFFICE IF BANDAGE COMES OFF. 6.  For the first 7 days after surgery, take temperature by mouth three times a day. Call the office if greater than 101F.    7.  Ambulate with non-weight bearing to the left lower extremity with surgical shoe or crutches / walker. 8.  All instructions are to be followed until otherwise instructed by your surgeon. 9.  Call our office if you have any concerns or questions which arise. Our phones are answered 24 hours a day. [phone]    90. Your first post-operative appointment is scheduled, call the office for appointment date and time if not known prior to today. [address] [facility]    There are potential side effects of anesthesia or sedation you may experience for the first 24 hours. These side effects include:    Confusion or Memory loss, Dizziness, or Delayed Reaction Times   [x]A responsible person should be with you for the next 24 hours. Do not operate any vehicles (automobiles, bicycles, motorcycles) or power tools or machinery for 24 hours. Do not sign any legal documents or make any legal decisions for 24 hours. Do not drink alcohol for 24 hours or while taking narcotic pain medication.       Nausea    [x]Start with light diet and progress to your normal diet as you feel like eating. However, if you experience nausea or repeated episodes of vomiting which persist beyond 12-24 hours, notify your physician. Once nausea has passed, remember to keep drinking fluids. Difficulty Passing Urine  [x]Drink extra amounts of fluid today. Notify your physician if you have not urinated within 8 hours after your procedure or you feel uncomfortable. Irritated Throat from a Breathing Tube  [x]Drink extra amounts of fluid today. Lozenges may help. Muscle Aches  [x]You may experience some generalized body aches as your muscles recover from medications used to relax them during surgery. These will gradually subside. MEDICATION INSTRUCTIONS:  []Prescription(S) x     sent with you. Use as directed. When taking pain medications, you may experience the side effect of dizziness or drowsiness. Do not drink alcohol or drive when taking these medications. []Prescription(S) x          Called to Pharmacy Name and location:    [x]Give the list of your medications to your primary care physician on your next visit. Keep your med list updated and carry it with in case of emergencies. [] Narcotic pain medications can cause the side effect of significant constipation. You may want to add a stool softener to your postoperative medication schedule or speak to your surgeon on how best to manage this side effect. NARCOTIC SAFETY:  Your pain medicine is only for you to take. Safely store your medicines. Store pills up high and out of reach of children and pets. Ensure safety caps are snapped tightly  Keep track of how many pills you have left    Unused medication can be disposed of by taking them to a drop-off box or take-back program that is authorized by the [facility]. Access to a site near you can be found on the [facility] website ([address]. [facility].gov).     If you have a CPAP machine, it is very important that you use it daily during all periods of sleep and daytime rest during your recovery at home. Surgery and Anesthesia place a significant amount of stress on your body. Using your CPAP will help keep you safe and lessen the negative effects of that stress. FOLLOW-UP RECOVERY CARE:  [x]Call the office at  for follow-up appointment and problems    Watch for these possible complications, symptoms, or side effects of anesthesia. Call physician if they or any other problems occur:  Signs of INFECTION   > Fever over 101°     > Redness, swelling, hardness or warmth at the operative site   >Foul smelling or cloudy drainage at the operative site   Unrelieved PAIN  Unrelieved NAUSEA  Blood soaked dressing. (Some oozing may be normal)  Inability to urinate      Numb, pale, blue, cold or tingling extremity      Physician: The above instructions were reviewed with patient/significant other. The following additional patient specific information was reviewed with the patient/significant other:  [x]Procedure/physician specific instructions  []Medication information sheet(S) including potential side effects  []Hzanes egress test  []Pain Ball management  []FAQ Catheter associated blood stream infections    FAQs  (frequently asked questions)  About Surgical Site Infections     What is a Surgical Site Infection (SSI)? A surgical site infection is an infection that occurs after surgery in the part of the body   where the surgery took place. Most patients who have surgery do not develop an   infection. However, infections develop in about 1 to 3 out of every 100 patients who  have surgery. Some common symptoms of a surgical site infection are:   Redness and pain around the area where you had surgery  Drainage of cloudy fluid from your surgical wound   Fever     Can SSIs be treated? Yes. Most surgical site infections can be treated with antibiotics. The antibiotic given to  you depends on the bacteria (germs) causing the infection.   Sometimes patients with  SSIs also need another surgery to treat the infection. What are some of the things that hospitals are doing to prevent SSIs? To prevent SSIs, doctors, nurses and other healthcare providers:   Clean their hands and arms up to their elbows with an antiseptic agent just before the surgery. Clean their hands with soap and water or an alcohol-based hand rub before and after caring for each patient. May remove some of your hair immediately before your surgery using electric clippers if the hair is in the same area where the procedure will occur. They should not shave you with a razor. Wear special hair covers, masks, gowns, and gloves during surgery to keep the surgery area clean. Give you antibiotics before your surgery starts. In most cases, you should get antibiotics within 60 minutes before the surgery starts and the antibiotics should be stopped within 24 hours after surgery. Clean the skin at the site of your surgery with a special soap that kills germs. What can I do to help prevent SSIs? Before your surgery:  Tell your doctor about other medical problems you may have. Health problems such as allergies, diabetes, and obesity could affect your surgery and your treatment. Quit smoking. Patients who smoke get more infections. Talk to your doctor about how you can quit before your surgery. Do not shave near where you will have surgery. Shaving with a razor can irritate your skin and make it easier to develop an infection. At the time of your surgery:  Speak up if someone tries to shave you with a razor before surgery. Ask why you need to be shaved and talk with your surgeon if you have any concerns. Ask if you will get antibiotics before surgery. After your surgery:  Make sure that your healthcare providers clean their hands before examining you, either with soap and water or an alcohol-based hand rub.    IF YOU DO NOT SEE YOUR PROVIDERS CLEAN THEIR HANDS, PLEASE ASK THEM TO DO SO. Family and friends who visit you should not touch the surgical wound or dressings. Family and friends should clean their hands with soap and water or an alcohol-based hand rub before and after visiting you. If you do not see them clean their hands, ask them to clean their hands. What do I need to do when I go home from the hospital?  Before you go home, your doctor nurses should explain everything you need to know about taking care of your wound. Make sure you understand how to care for your wound before you leave the hospital.    Always clean your hands before and after caring for your wound. Before you go home, make sure you know who to contact if you have questions or problems after you get home. If you have any symptoms of an infection, such as redness and pain at the surgery site, drainage, or fever, call your doctor immediately. If you have additional questions, please ask your doctor or nurse. FAQ Surgical Site Infections  []Other-    I have read and understand the instructions given to me: ____________________________________________   (Patient/S.O. Signature)            Date/time 1/13/2023 10:52 AM         PACU:  [phone]   M-F 700 AM - 7 PM      SAME DAY SERVICES:  [phone] M-F 7AM-6PM        If you smoke STOP. We care about your health! Adult

## 2025-02-17 LAB
ALBUMIN: 3.8 G/DL
ALP BLD-CCNC: 87 U/L
ALT SERPL-CCNC: 24 U/L
ANION GAP SERPL CALCULATED.3IONS-SCNC: 9.5 MMOL/L
AST SERPL-CCNC: 27 U/L
BASOPHILS ABSOLUTE: 0.1 /ΜL
BASOPHILS RELATIVE PERCENT: 1.2 %
BILIRUB SERPL-MCNC: 0.3 MG/DL (ref 0.1–1.4)
BUN BLDV-MCNC: 15 MG/DL
C-REACTIVE PROTEIN: 3.29
CALCIUM SERPL-MCNC: 9.5 MG/DL
CHLORIDE BLD-SCNC: 102 MMOL/L
CO2: 25 MMOL/L
CREAT SERPL-MCNC: 1.23 MG/DL
EOSINOPHILS ABSOLUTE: 0.3 /ΜL
EOSINOPHILS RELATIVE PERCENT: 3.9 %
FUNGUS SPEC CULT: NORMAL
GFR, ESTIMATED: 62
GLUCOSE BLD-MCNC: 111 MG/DL
HCT VFR BLD CALC: 38.2 % (ref 41–53)
HEMOGLOBIN: 11.9 G/DL (ref 13.5–17.5)
LOEFFLER MB STN SPEC: NORMAL
LYMPHOCYTES ABSOLUTE: 1.6 /ΜL
LYMPHOCYTES RELATIVE PERCENT: 19.3 %
MCH RBC QN AUTO: 28.1 PG
MCHC RBC AUTO-ENTMCNC: 31.2 G/DL
MCV RBC AUTO: 90.3 FL
MONOCYTES ABSOLUTE: 0.9 /ΜL
MONOCYTES RELATIVE PERCENT: 10.6 %
NEUTROPHILS ABSOLUTE: 5.4 /ΜL
NEUTROPHILS RELATIVE PERCENT: 64.2 %
PDW BLD-RTO: 14.2 %
PLATELET # BLD: 330 K/ΜL
PMV BLD AUTO: 10.4 FL
POTASSIUM SERPL-SCNC: 3.9 MMOL/L
RBC # BLD: 4.23 10^6/ΜL
SED RATE, AUTOMATED: 17
SODIUM BLD-SCNC: 139 MMOL/L
TOTAL CK: 46 U/L
TOTAL PROTEIN: 6.8 G/DL (ref 6.4–8.2)
WBC # BLD: 8.4 10^3/ML

## 2025-02-18 ENCOUNTER — TELEPHONE (OUTPATIENT)
Dept: INFECTIOUS DISEASES | Age: 75
End: 2025-02-18

## 2025-02-18 NOTE — TELEPHONE ENCOUNTER
LVM for patients wife to return call for weekly update.  Office contact information provided.     Spoke with RN at [facility] and will fax labs from yesterday. Office contact information provided.       OPAT Nurse Coordinator Weekly Update Note    Current OPAT plan:   IV daptomycin 500 mg daily  - Planned End date: 3/14/2025    Diagnosis:  right foot 3rd MT OM    Assessment:  Spoke with patients wife.  She reports that the patient is doing well.  Using scooter and adhering to NWB status.  Drsg intact with some clear drainage noted.  Denies fevers.  Sutures remain in place    Follow up appts:  Dr. Acuña 2/26/25

## 2025-02-24 LAB
ALBUMIN: 3.8 G/DL
ALP BLD-CCNC: 94 U/L
ALT SERPL-CCNC: 15 U/L
ANION GAP SERPL CALCULATED.3IONS-SCNC: 12 MMOL/L
AST SERPL-CCNC: 19 U/L
BASOPHILS ABSOLUTE: 0.1 /ΜL
BASOPHILS RELATIVE PERCENT: 0.9 %
BILIRUB SERPL-MCNC: 0.4 MG/DL (ref 0.1–1.4)
BUN BLDV-MCNC: 18 MG/DL
C-REACTIVE PROTEIN: 8.31
CALCIUM SERPL-MCNC: 10.6 MG/DL
CHLORIDE BLD-SCNC: 102 MMOL/L
CO2: 25 MMOL/L
CREAT SERPL-MCNC: 1.21 MG/DL
EOSINOPHILS ABSOLUTE: 0.4 /ΜL
EOSINOPHILS RELATIVE PERCENT: 5 %
FUNGUS SPEC CULT: NORMAL
GFR, ESTIMATED: 64
GLUCOSE BLD-MCNC: 90 MG/DL
HCT VFR BLD CALC: 38.3 % (ref 41–53)
HEMOGLOBIN: 12.7 G/DL (ref 13.5–17.5)
LOEFFLER MB STN SPEC: NORMAL
LYMPHOCYTES ABSOLUTE: 1.8 /ΜL
LYMPHOCYTES RELATIVE PERCENT: 21.5 %
MCH RBC QN AUTO: 29.5 PG
MCHC RBC AUTO-ENTMCNC: 33.2 G/DL
MCV RBC AUTO: 89.1 FL
MONOCYTES ABSOLUTE: 0.8 /ΜL
MONOCYTES RELATIVE PERCENT: 9.9 %
NEUTROPHILS ABSOLUTE: 5.3 /ΜL
NEUTROPHILS RELATIVE PERCENT: 62.2 %
PDW BLD-RTO: 14.8 %
PLATELET # BLD: 283 K/ΜL
PMV BLD AUTO: 10.9 FL
POTASSIUM SERPL-SCNC: 4 MMOL/L
RBC # BLD: 4.3 10^6/ΜL
SED RATE, AUTOMATED: 15
SODIUM BLD-SCNC: 139 MMOL/L
TOTAL CK: 62 U/L
TOTAL PROTEIN: 6.9 G/DL (ref 6.4–8.2)
WBC # BLD: 8.5 10^3/ML

## 2025-02-24 NOTE — PROGRESS NOTES
Interventional Cardiology Consultation     Bhaskar Magañachintan  1950    PCP: Hilda Mir MD  Referring Physician: Dr. Antonio Acuña  Reason for Referral: PAD   Chief Complaint: \"I was in the hospital\"  Chief Complaint   Patient presents with    Follow-Up from Hospital     PAD     Subjective:   History of Present Illness: The patient is 74 y.o. male with a past medical history PAD, significant for DM, hypertension and hyperlipidemia. Patient had surgery on his left foot in September for a bone spur and postoperatively, wound opened afterwards. Since then he has c/o foot pain. He developed infection and is s/p left foot debridement incision and drainage by Dr. Acuña on 11/22/22. He underwent peripheral that resulted in atherectomy and balloon angioplasty of the left superficial femoral artery, left popliteal artery, and  left posterior tibial artery. Previously reported new wound of RLE. Arterial doppler showed severe disease. s/p successful shockwave lithotripsy and drug-coated balloon angioplasty of significant lesion right SFA 12/6/24. S/p amputation of right 3rd toe 1/6/25. Patient admitted to [facility] 1/29/25-2/6/25 with diabetic foot ulcer of the right lower extremity. S/p I&D per Dr. Acuña 1/29/25. Patient here today for follow up. Reports overall he is doing good. Reports site continues to heal of the right foot post amputation. Denies any new wounds or claudication.       Past Medical History:   Diagnosis Date    CAD (coronary artery disease)     CKD (chronic kidney disease) stage 3, GFR 30-59 ml/min (HCC)     Diabetes mellitus (HCC)     HLD (hyperlipidemia)     HTN (hypertension)     Hyperlipidemia     Hypertension     Ischemic stroke (HCC)     MI (myocardial infarction) ([facility])      Past Surgical History:   Procedure Laterality Date    ANKLE SURGERY Left 09/19/2022    REPAIR PERONEUS BREVIS-LEFT FOOT performed by Antonio Acuña DPM at [facility] OR

## 2025-02-26 ENCOUNTER — OFFICE VISIT (OUTPATIENT)
Dept: CARDIOLOGY CLINIC | Age: 75
End: 2025-02-26
Payer: MEDICARE

## 2025-02-26 VITALS
OXYGEN SATURATION: 96 % | SYSTOLIC BLOOD PRESSURE: 138 MMHG | HEART RATE: 87 BPM | DIASTOLIC BLOOD PRESSURE: 72 MMHG | BODY MASS INDEX: 32.86 KG/M2 | HEIGHT: 69 IN

## 2025-02-26 DIAGNOSIS — I73.9 PAD (PERIPHERAL ARTERY DISEASE): Primary | ICD-10-CM

## 2025-02-26 DIAGNOSIS — I25.10 CAD IN NATIVE ARTERY: ICD-10-CM

## 2025-02-26 PROCEDURE — 3017F COLORECTAL CA SCREEN DOC REV: CPT | Performed by: INTERNAL MEDICINE

## 2025-02-26 PROCEDURE — 1111F DSCHRG MED/CURRENT MED MERGE: CPT | Performed by: INTERNAL MEDICINE

## 2025-02-26 PROCEDURE — G8427 DOCREV CUR MEDS BY ELIG CLIN: HCPCS | Performed by: INTERNAL MEDICINE

## 2025-02-26 PROCEDURE — 3075F SYST BP GE 130 - 139MM HG: CPT | Performed by: INTERNAL MEDICINE

## 2025-02-26 PROCEDURE — 99213 OFFICE O/P EST LOW 20 MIN: CPT | Performed by: INTERNAL MEDICINE

## 2025-02-26 PROCEDURE — 1036F TOBACCO NON-USER: CPT | Performed by: INTERNAL MEDICINE

## 2025-02-26 PROCEDURE — 1123F ACP DISCUSS/DSCN MKR DOCD: CPT | Performed by: INTERNAL MEDICINE

## 2025-02-26 PROCEDURE — G2211 COMPLEX E/M VISIT ADD ON: HCPCS | Performed by: INTERNAL MEDICINE

## 2025-02-26 PROCEDURE — 3078F DIAST BP <80 MM HG: CPT | Performed by: INTERNAL MEDICINE

## 2025-02-26 PROCEDURE — G8417 CALC BMI ABV UP PARAM F/U: HCPCS | Performed by: INTERNAL MEDICINE

## 2025-02-26 PROCEDURE — 1159F MED LIST DOCD IN RCRD: CPT | Performed by: INTERNAL MEDICINE

## 2025-03-01 PROBLEM — Z01.810 PREOPERATIVE CARDIOVASCULAR EXAMINATION: Status: RESOLVED | Noted: 2025-01-30 | Resolved: 2025-03-01

## 2025-03-03 LAB
ALBUMIN: 3.9 G/DL
ALP BLD-CCNC: 83 U/L
ALT SERPL-CCNC: 19 U/L
ANION GAP SERPL CALCULATED.3IONS-SCNC: 14 MMOL/L
AST SERPL-CCNC: 24 U/L
BASOPHILS ABSOLUTE: 0.1 /ΜL
BASOPHILS RELATIVE PERCENT: 1.2 %
BILIRUB SERPL-MCNC: 0.4 MG/DL (ref 0.1–1.4)
BUN BLDV-MCNC: 17 MG/DL
C-REACTIVE PROTEIN: 3
CALCIUM SERPL-MCNC: 9.5 MG/DL
CHLORIDE BLD-SCNC: 99 MMOL/L
CO2: 22 MMOL/L
CREAT SERPL-MCNC: 1.27 MG/DL
EOSINOPHILS ABSOLUTE: 0.3 /ΜL
EOSINOPHILS RELATIVE PERCENT: 4.2 %
FUNGUS SPEC CULT: NORMAL
GFR, ESTIMATED: 60
GLUCOSE BLD-MCNC: 150 MG/DL
HCT VFR BLD CALC: 39.7 % (ref 41–53)
HEMOGLOBIN: 12.5 G/DL (ref 13.5–17.5)
LOEFFLER MB STN SPEC: NORMAL
LYMPHOCYTES ABSOLUTE: 1.4 /ΜL
LYMPHOCYTES RELATIVE PERCENT: 17.9 %
MCH RBC QN AUTO: 28.7 PG
MCHC RBC AUTO-ENTMCNC: 31.5 G/DL
MCV RBC AUTO: 91.1 FL
MONOCYTES ABSOLUTE: 0.6 /ΜL
MONOCYTES RELATIVE PERCENT: 8 %
NEUTROPHILS ABSOLUTE: 5.2 /ΜL
NEUTROPHILS RELATIVE PERCENT: 68.3 %
PDW BLD-RTO: 14.7 %
PLATELET # BLD: 230 K/ΜL
PMV BLD AUTO: 11 FL
POTASSIUM SERPL-SCNC: 5.2 MMOL/L
RBC # BLD: 4.36 10^6/ΜL
SED RATE, AUTOMATED: 10
SODIUM BLD-SCNC: 135 MMOL/L
TOTAL CK: 57 U/L
TOTAL PROTEIN: 7 G/DL (ref 6.4–8.2)
WBC # BLD: 7.6 10^3/ML

## 2025-03-10 ENCOUNTER — TELEPHONE (OUTPATIENT)
Dept: INFECTIOUS DISEASES | Age: 75
End: 2025-03-10

## 2025-03-10 LAB
ALBUMIN: 4.4 G/DL
ALP BLD-CCNC: 85 U/L
ALT SERPL-CCNC: 14 U/L
ANION GAP SERPL CALCULATED.3IONS-SCNC: 13 MMOL/L
AST SERPL-CCNC: 18 U/L
BASOPHILS ABSOLUTE: 0.1 /ΜL
BASOPHILS RELATIVE PERCENT: 0.8 %
BILIRUB SERPL-MCNC: 0.5 MG/DL (ref 0.1–1.4)
BUN BLDV-MCNC: 15 MG/DL
C-REACTIVE PROTEIN: 4.26
CALCIUM SERPL-MCNC: 9.6 MG/DL
CHLORIDE BLD-SCNC: 102 MMOL/L
CO2: 25 MMOL/L
CREAT SERPL-MCNC: 1.22 MG/DL
EOSINOPHILS ABSOLUTE: 0.3 /ΜL
EOSINOPHILS RELATIVE PERCENT: 3.7 %
GFR, ESTIMATED: 63
GLUCOSE BLD-MCNC: 122 MG/DL
HCT VFR BLD CALC: 39.5 % (ref 41–53)
HEMOGLOBIN: 12.8 G/DL (ref 13.5–17.5)
LYMPHOCYTES ABSOLUTE: 1.6 /ΜL
LYMPHOCYTES RELATIVE PERCENT: 17.9 %
MCH RBC QN AUTO: 29.5 PG
MCHC RBC AUTO-ENTMCNC: 32.4 G/DL
MCV RBC AUTO: 91 FL
MONOCYTES ABSOLUTE: 0.9 /ΜL
MONOCYTES RELATIVE PERCENT: 10.2 %
NEUTROPHILS ABSOLUTE: 6 /ΜL
NEUTROPHILS RELATIVE PERCENT: 67.1 %
PDW BLD-RTO: 14.8 %
PLATELET # BLD: 217 K/ΜL
PMV BLD AUTO: 10.9 FL
POTASSIUM SERPL-SCNC: 4 MMOL/L
RBC # BLD: 4.34 10^6/ΜL
SED RATE, AUTOMATED: 9
SODIUM BLD-SCNC: 140 MMOL/L
TOTAL CK: 55 U/L
TOTAL PROTEIN: 6.8 G/DL (ref 6.4–8.2)
WBC # BLD: 8.9 10^3/ML

## 2025-03-11 ENCOUNTER — TELEPHONE (OUTPATIENT)
Dept: INFECTIOUS DISEASES | Age: 75
End: 2025-03-11

## 2025-03-11 DIAGNOSIS — M86.9 OSTEOMYELITIS OF RIGHT FOOT, UNSPECIFIED TYPE (HCC): ICD-10-CM

## 2025-03-11 RX ORDER — DOXYCYCLINE HYCLATE 100 MG
100 TABLET ORAL 2 TIMES DAILY
Qty: 28 TABLET | Refills: 0 | Status: SHIPPED | OUTPATIENT
Start: 2025-03-11 | End: 2025-03-25

## 2025-03-11 NOTE — TELEPHONE ENCOUNTER
OPAT patient on 6 weeks IV dapto through 3/14 for R DFI with OM.  Inflammatory markers have normalized.    Reviewed DPM note from 3/5:              Patient is progressing well.  Wound still requiring debridement but does not probe.  We can end OPAT as planned.   Start PO doxy 100 mg BID x 2 weeks.   Have patient or DPM call us if worsens.     Please  that doxy can make patient more sensitive to sunburn.   May cause some GI upset- can take with food to minimize.     Zack GruberD, [facility]  Clinical Pharmacy Specialist- [facility] Infectious Disease  Phone: [phone] (also available on Sokikom)  Fax: [phone]    For Pharmacy Admin Tracking Only    Program: Medical Group  CPA in place: Yes  Intervention Detail: Discontinued Rx: 1, reason: Therapy De-escalation and New Rx: 1, reason: Needs Additional Therapy  Time Spent (min): 20

## 2025-03-11 NOTE — TELEPHONE ENCOUNTER
Pt called asking about receiving a message to take oral doxy. Pt verbalized understanding to start oral abx after IV abx finished on the 14 th.

## 2025-03-11 NOTE — TELEPHONE ENCOUNTER
OPAT End  Call made to pharmacy  Spoke with Deonte at [facility] and advised of pharmacist note ok to end IV abx and pull PICC as planned on 3/14/25.  He v/u     Call made to JAKE  [facility] for Jennifer FUNES at [facility] and advised of above.  Office contact information provided.     Call made to patient  Spoke with patients wife and advised of above.      Also advised of pharmacist note:  Start PO doxy 100 mg BID x 2 weeks.   Have patient or DPM call us if worsens.      Please  that doxy can make patient more sensitive to sunburn.   May cause some GI upset- can take with food to minimize.    She v/u     Will f/u in 1-2 days to verify line removal

## 2025-04-01 ENCOUNTER — TELEPHONE (OUTPATIENT)
Dept: INFECTIOUS DISEASES | Age: 75
End: 2025-04-01

## 2025-04-01 NOTE — TELEPHONE ENCOUNTER
Received call from patient stating he has completed his PO course of Doxy.  He does not know what he is to do next.      He reports that his foot has not completely closed and it is still weeping clear fluid.  He denies fevers.    He has an appt with Dr. Acuña tomorrow at 1:30.  He reports there is talk about possibly doing a skin graft.

## 2025-04-02 ENCOUNTER — TELEPHONE (OUTPATIENT)
Dept: INFECTIOUS DISEASES | Age: 75
End: 2025-04-02

## 2025-04-02 NOTE — TELEPHONE ENCOUNTER
Received call from patient  updating us regarding his podiatry appt from todays visit.  He reports that the DPM says it is healing and starting to close and that he also gave him another 2 weeks worth of PO doxy.

## 2025-04-02 NOTE — TELEPHONE ENCOUNTER
We need an assessment and plan from Dr. Acuña after visit to see where to go from here. I can send him a perfectserve after his visit. Tell the patient to tell DPM that we will be reaching out to discuss.     If superficial and no probe to bone and no further surgical plan, may be able to culture and treat with PO.    But ultimately it sounds like he may need further surgical debridement and repeat closure in which we would see him in the hospital.

## 2025-05-15 NOTE — PROGRESS NOTES
Interventional Cardiology Consultation     Bhaskar Mayerezekiel  1950    PCP: Hilda Mir MD  Referring Physician: Dr. Antonio Acuña  Reason for Referral: PAD   Chief Complaint: \"I'm doing alright\"  Chief Complaint   Patient presents with    Follow-up     PAD     Subjective:   History of Present Illness: The patient is 75 y.o. male with a past medical history PAD, significant for DM, hypertension and hyperlipidemia. Patient previously had surgery on his left foot for a bone spur and postoperatively, wound opened afterwards. Since then he has c/o foot pain. He developed infection and is s/p left foot debridement incision and drainage by Dr. Acuña on 11/22/22. He underwent peripheral that resulted in atherectomy and balloon angioplasty of the left superficial femoral artery, left popliteal artery, and  left posterior tibial artery 1/2023. Previously reported new wound of RLE. Arterial doppler showed severe disease. s/p successful shockwave lithotripsy and drug-coated balloon angioplasty of significant lesion right SFA 12/6/24. S/p amputation of right 3rd toe 1/6/25. Patient admitted to [facility] 1/29/25-2/6/25 with diabetic foot ulcer of the right lower extremity. S/p I&D per Dr. Acuña 1/29/25. Patient here today for follow up. Reports overall he is doing alright. He denies any new wounds or worsening claudication. Patient endorses new edema of right ankle. Patient has recently started walking more to be active. Patient currently denies any weight gain, edema, palpitations, chest pain, shortness of breath, PND, dizziness, and syncope.      Past Medical History:   Diagnosis Date    CAD (coronary artery disease)     CKD (chronic kidney disease) stage 3, GFR 30-59 ml/min (HCC)     Diabetes mellitus (HCC)     HLD (hyperlipidemia)     HTN (hypertension)     Hyperlipidemia     Hypertension     Ischemic stroke (HCC)     MI (myocardial infarction) ([facility])      Past Surgical History:

## 2025-05-16 ENCOUNTER — OFFICE VISIT (OUTPATIENT)
Dept: CARDIOLOGY CLINIC | Age: 75
End: 2025-05-16
Payer: MEDICARE

## 2025-05-16 VITALS
BODY MASS INDEX: 33.47 KG/M2 | HEIGHT: 69 IN | WEIGHT: 226 LBS | SYSTOLIC BLOOD PRESSURE: 142 MMHG | DIASTOLIC BLOOD PRESSURE: 70 MMHG | OXYGEN SATURATION: 96 % | HEART RATE: 75 BPM

## 2025-05-16 DIAGNOSIS — R60.0 LOWER EXTREMITY EDEMA: ICD-10-CM

## 2025-05-16 DIAGNOSIS — I73.9 PAD (PERIPHERAL ARTERY DISEASE): Primary | ICD-10-CM

## 2025-05-16 PROCEDURE — 1159F MED LIST DOCD IN RCRD: CPT | Performed by: INTERNAL MEDICINE

## 2025-05-16 PROCEDURE — 1123F ACP DISCUSS/DSCN MKR DOCD: CPT | Performed by: INTERNAL MEDICINE

## 2025-05-16 PROCEDURE — G2211 COMPLEX E/M VISIT ADD ON: HCPCS | Performed by: INTERNAL MEDICINE

## 2025-05-16 PROCEDURE — 1036F TOBACCO NON-USER: CPT | Performed by: INTERNAL MEDICINE

## 2025-05-16 PROCEDURE — 3017F COLORECTAL CA SCREEN DOC REV: CPT | Performed by: INTERNAL MEDICINE

## 2025-05-16 PROCEDURE — 3077F SYST BP >= 140 MM HG: CPT | Performed by: INTERNAL MEDICINE

## 2025-05-16 PROCEDURE — 3078F DIAST BP <80 MM HG: CPT | Performed by: INTERNAL MEDICINE

## 2025-05-16 PROCEDURE — G8427 DOCREV CUR MEDS BY ELIG CLIN: HCPCS | Performed by: INTERNAL MEDICINE

## 2025-05-16 PROCEDURE — G8417 CALC BMI ABV UP PARAM F/U: HCPCS | Performed by: INTERNAL MEDICINE

## 2025-05-16 PROCEDURE — 99213 OFFICE O/P EST LOW 20 MIN: CPT | Performed by: INTERNAL MEDICINE

## (undated) DEVICE — CATHETER 5FR CORDIS PIG 145DEG 110CM

## (undated) DEVICE — MEDICINE CUP, GRADUATED, STER: Brand: MEDLINE

## (undated) DEVICE — HYPODERMIC SAFETY NEEDLE: Brand: MAGELLAN

## (undated) DEVICE — GLOVE ORANGE PI 8   MSG9080

## (undated) DEVICE — GLOVE ORTHO 8   MSG9480

## (undated) DEVICE — GLOVE SURG SZ 65 THK91MIL LTX FREE SYN POLYISOPRENE

## (undated) DEVICE — STOCKINETTE,IMPERVIOUS,12X48,STERILE: Brand: MEDLINE

## (undated) DEVICE — PODIATRY: Brand: MEDLINE INDUSTRIES, INC.

## (undated) DEVICE — CATHETER IVL SHOCKWAVE  7.0MM 135CM

## (undated) DEVICE — MICRO SAGITTAL BLADE (9.4 X 0.4 X 26.2MM)

## (undated) DEVICE — TOWEL,OR,DSP,ST,BLUE,DLX,8/PK,10PK/CS: Brand: MEDLINE

## (undated) DEVICE — COAXIAL HIGH FLOW TIP WITH SOFT SHIELD

## (undated) DEVICE — ZIMMER® STERILE DISPOSABLE TOURNIQUET CUFF WITH PLC, DUAL PORT, DUAL BLADDER, 18 IN. (46 CM)

## (undated) DEVICE — STANDARD HYPODERMIC NEEDLE,POLYPROPYLENE HUB: Brand: MONOJECT

## (undated) DEVICE — PADDING BNDG UNDERCAST 3 MRX10 CM N ABSORBENT N WOVEN ARTFLX

## (undated) DEVICE — HANDPIECE SUCTION TUBING INTERPULSE 10FT

## (undated) DEVICE — PRECISION THIN (9.0 X 0.38 X 31.0MM)

## (undated) DEVICE — DRAPE,U/ SHT,SPLIT,PLAS,STERIL: Brand: MEDLINE

## (undated) DEVICE — T-DRAPE,EXTREMITY,STERILE: Brand: MEDLINE

## (undated) DEVICE — SUTURE MCRYL + SZ 4-0 L27IN ABSRB UD L19MM PS-2 3/8 CIR MCP426H

## (undated) DEVICE — GLOVE SURG SZ 7 L12IN FNGR THK79MIL GRN LTX FREE

## (undated) DEVICE — SUTURE VCRL + SZ 3-0 L27IN ABSRB UD CT-2 L26MM 1/2 CIR TAPR VCP232H

## (undated) DEVICE — C-ARM: Brand: UNBRANDED

## (undated) DEVICE — PINNACLE INTRODUCER SHEATH: Brand: PINNACLE

## (undated) DEVICE — SUTURE VCRL SZ 4-0 L27IN ABSRB UD L26MM SH 1/2 CIR J415H

## (undated) DEVICE — E-Z CLEAN, NON-STICK, PTFE COATED, ELECTROSURGICAL BLADE ELECTRODE, 2.5 INCH (6.35 CM): Brand: EZ CLEAN

## (undated) DEVICE — RADIFOCUS GLIDEWIRE ADVANTAGE GUIDEWIRE: Brand: GLIDEWIRE ADVANTAGE

## (undated) DEVICE — SUTURE VICRYL + SZ 4-0 L18IN ABSRB PC-1 VCP835G

## (undated) DEVICE — TOWEL,STOP FLAG GOLD N-W: Brand: MEDLINE

## (undated) DEVICE — COVER LT HNDL BLU PLAS

## (undated) DEVICE — DRAPE,REIN 53X77,STERILE: Brand: MEDLINE

## (undated) DEVICE — Device

## (undated) DEVICE — DRESSING,GAUZE,XEROFORM,CURAD,1"X8",ST: Brand: CURAD

## (undated) DEVICE — SUTURE ETHILON SZ 3-0 L18IN NONABSORBABLE BLK PS-2 L19MM 3/8 1669H

## (undated) DEVICE — HIGH FLOW TIP

## (undated) DEVICE — ELECTRODE PT RET AD L9FT HI MOIST COND ADH HYDRGEL CORDED

## (undated) DEVICE — BLADE SAW SM W10XL18.5MM THK4MM OSC

## (undated) DEVICE — SUTURE VCRL SZ 4-0 L18IN ABSRB UD L13MM PC-1 3/8 CIR PRIM J835G

## (undated) DEVICE — SUTURE N ABSRB L 18 IN SZ 4-0 NDL L 19 MM NYL MONOFILAMENT

## (undated) DEVICE — COTTON UNDERCAST PADDING,CRIMPED FINISH: Brand: WEBRIL

## (undated) DEVICE — DRILL: Brand: SONICFUSION

## (undated) DEVICE — PACK PROCEDURE SURG EXTREMITY MFFOP CUST

## (undated) DEVICE — SUTURE VICRYL + SZ 3-0 L27IN ABSRB UD CT-2 L26MM 1/2 CIR TAPR VCP232H

## (undated) DEVICE — SYRINGE MED 10ML TRNSLUC BRL PLUNG BLK MRK POLYPR CTRL

## (undated) DEVICE — INTENDED FOR TISSUE SEPARATION, AND OTHER PROCEDURES THAT REQUIRE A SHARP SURGICAL BLADE TO PUNCTURE OR CUT.: Brand: BARD-PARKER ® STAINLESS STEEL BLADES

## (undated) DEVICE — SUTURE VCRL + SZ 4-0 L18IN ABSRB UD L19MM PS-2 3/8 CIR PRIM VCP496H

## (undated) DEVICE — GAUZE,SPONGE,4"X4",16PLY,XRAY,STRL,LF: Brand: MEDLINE

## (undated) DEVICE — HI-TORQUE COMMAND ES GUIDE WIRE .014" 300 CM: Brand: HI-TORQUE COMMAND

## (undated) DEVICE — PADDING CAST W4INXL4YD ST COT RAYON MICROPLEATED HIGHLY

## (undated) DEVICE — NAVICROSS SUPPORT CATHETER: Brand: NAVICROSS

## (undated) DEVICE — SUTURE ETHLN SZ 4-0 L18IN NONABSORBABLE BLK L19MM PS-2 3/8 1667H

## (undated) DEVICE — ZIMMER® STERILE DISPOSABLE TOURNIQUET CUFF WITH PLC, DUAL PORT, SINGLE BLADDER, 24 IN. (61 CM)

## (undated) DEVICE — SOLUTION IRRIG 3000ML 0.9% SOD CHL USP UROMATIC PLAS CONT

## (undated) DEVICE — BANDAGE,GAUZE,BULKEE II,4.5"X4.1YD,STRL: Brand: MEDLINE

## (undated) DEVICE — NEPTUNE E-SEP SMOKE EVACUATION PENCIL, COATED, 70MM BLADE, PUSH BUTTON SWITCH: Brand: NEPTUNE E-SEP

## (undated) DEVICE — GUIDEWIRE VASC L150CM DIA0.035IN FLX END L7CM J 3MM PTFE

## (undated) DEVICE — SYRINGE, LUER LOCK, 10ML: Brand: MEDLINE

## (undated) DEVICE — DRESSING PETRO W3XL3IN OIL EMUL N ADH GZ KNIT IMPREG CELOS

## (undated) DEVICE — DESTINATION PERIPHERAL GUIDING SHEATH: Brand: DESTINATION

## (undated) DEVICE — BLADE ES L2.5IN PTFE STD TIP BOVIE E-Z CLN

## (undated) DEVICE — SUTURE ETHLN SZ 3-0 L18IN NONABSORBABLE BLK PS-2 L19MM 3/8 1669H

## (undated) DEVICE — MERCY FAIRFIELD TURNOVER KIT: Brand: MEDLINE INDUSTRIES, INC.

## (undated) DEVICE — GAUZE,SPONGE,4"X4",8PLY,STRL,LF,10/TRAY: Brand: MEDLINE

## (undated) DEVICE — SOLUTION IRRIG 500ML 0.9% SOD CHL USP POUR PLAS BTL

## (undated) DEVICE — INTRODUCER SHTH 0.018 IN 4 FRX40 CM KT SFT TIP NIT SS VSI

## (undated) DEVICE — TRAP FLUID

## (undated) DEVICE — BANDAGE COMPR W4INXL12FT E DISP ESMARCH EVEN

## (undated) DEVICE — SPONGE LAP W18XL18IN WHT STRUNG W/ RNG W/OUT LOOP RADPQ ST

## (undated) DEVICE — APPLICATOR MEDICATED 26 CC SOLUTION HI LT ORNG CHLORAPREP

## (undated) DEVICE — DEVON TUBE HOLDER REMOVABLE TOUCH FASTEN STRAP: Brand: DEVON

## (undated) DEVICE — CATHETER ANGIOPLSTY 130 CM 6X60 MM DRUG ELUT INPACT ADMIRAL

## (undated) DEVICE — GOWN SIRUS NONREIN XL W/TWL: Brand: MEDLINE INDUSTRIES, INC.

## (undated) DEVICE — TOWEL,OR,DSP,ST,BLUE,STD,6/PK,12PK/CS: Brand: MEDLINE

## (undated) DEVICE — SYRINGE IRRIG 60ML SFT PLIABLE BLB EZ TO GRP 1 HND USE W/